# Patient Record
Sex: FEMALE | Race: WHITE | Employment: PART TIME | URBAN - METROPOLITAN AREA
[De-identification: names, ages, dates, MRNs, and addresses within clinical notes are randomized per-mention and may not be internally consistent; named-entity substitution may affect disease eponyms.]

---

## 2017-03-14 ENCOUNTER — OFFICE VISIT (OUTPATIENT)
Dept: FAMILY MEDICINE CLINIC | Facility: CLINIC | Age: 47
End: 2017-03-14

## 2017-03-14 ENCOUNTER — APPOINTMENT (OUTPATIENT)
Dept: LAB | Age: 47
End: 2017-03-14
Attending: INTERNAL MEDICINE
Payer: COMMERCIAL

## 2017-03-14 VITALS
HEIGHT: 67 IN | HEART RATE: 84 BPM | RESPIRATION RATE: 16 BRPM | SYSTOLIC BLOOD PRESSURE: 150 MMHG | DIASTOLIC BLOOD PRESSURE: 100 MMHG | WEIGHT: 216 LBS | BODY MASS INDEX: 33.9 KG/M2

## 2017-03-14 DIAGNOSIS — E11.8 TYPE 2 DIABETES MELLITUS WITH COMPLICATION, WITHOUT LONG-TERM CURRENT USE OF INSULIN (HCC): Primary | ICD-10-CM

## 2017-03-14 DIAGNOSIS — E11.8 TYPE 2 DIABETES MELLITUS WITH COMPLICATION, WITHOUT LONG-TERM CURRENT USE OF INSULIN (HCC): ICD-10-CM

## 2017-03-14 LAB
EST. AVERAGE GLUCOSE BLD GHB EST-MCNC: 131 MG/DL (ref 68–126)
HBA1C MFR BLD HPLC: 6.2 % (ref ?–5.7)

## 2017-03-14 PROCEDURE — 83036 HEMOGLOBIN GLYCOSYLATED A1C: CPT

## 2017-03-14 PROCEDURE — 99213 OFFICE O/P EST LOW 20 MIN: CPT | Performed by: INTERNAL MEDICINE

## 2017-03-14 PROCEDURE — 36415 COLL VENOUS BLD VENIPUNCTURE: CPT

## 2017-03-14 NOTE — PATIENT INSTRUCTIONS
Thank you for choosing Shaq Bruno MD at [place] [address]  To Do: Ngozi Mathew  1. Hemoglobin A1C today (3 month blood sugar average)  2.  Follow up for physical in 3 months    • Please signup for MY CHART, which is electronic access to your patricia please call Central Scheduling at [phone]  Please allow our office 5 business days to contact you regarding any testing results.     Refill policies:   Allow 3 business days for refills; controlled substances may take longer and must be picked up from

## 2017-03-14 NOTE — PROGRESS NOTES
[facility] Internal Medicine Office Note  Chief Complaint:   Patient presents with:  Diabetes  Medication Follow-Up  Blood Pressure      HPI:   This is a 51year old female coming in for diabetes check  She started metformin in November for new D Take 10 mg by mouth daily. Disp:  Rfl:          REVIEW OF SYSTEMS:   Review of Systems   Constitutional: Negative for fever. Respiratory: Negative for shortness of breath. Cardiovascular: Negative for chest pain. Endocrine: Negative for polyuria. vaccines from FDA   -     Hemoglobin A1C [E];  Future          Orders Placed This Encounter  Hemoglobin A1C [E]    Meds & Refills for this Visit:  No prescriptions requested or ordered in this encounter    Imaging & Consults:  None    Annual Depression Scre

## 2017-03-15 ENCOUNTER — TELEPHONE (OUTPATIENT)
Dept: FAMILY MEDICINE CLINIC | Facility: CLINIC | Age: 47
End: 2017-03-15

## 2017-05-11 ENCOUNTER — TELEPHONE (OUTPATIENT)
Dept: FAMILY MEDICINE CLINIC | Facility: CLINIC | Age: 47
End: 2017-05-11

## 2017-05-11 RX ORDER — ALPRAZOLAM 0.25 MG/1
TABLET ORAL
Qty: 6 TABLET | Refills: 0 | Status: SHIPPED
Start: 2017-05-11 | End: 2018-10-25

## 2017-05-11 NOTE — TELEPHONE ENCOUNTER
Per Dr. Ger Brooke she would like patient to be given Alprazolam 0.25mg #6 tablets prn anxiety. She asked me to have NP sign.

## 2017-05-11 NOTE — TELEPHONE ENCOUNTER
Requesting Anti-Anxiety Med for flight  LOV: 3/14/17  RTC: 3 months  Last Labs: n/a  Filled: never prescribed in history    Future Appointments  Date Time Provider Jennifer Serrato   6/16/2017 9:30 AM Harjit Mahajan MD [facility] [address] [facility] [address]

## 2017-05-11 NOTE — TELEPHONE ENCOUNTER
[facility] [address] [place] [address] [id number] Health system AT [facility]  [address], [phone], [phone]

## 2017-06-30 ENCOUNTER — PATIENT OUTREACH (OUTPATIENT)
Dept: FAMILY MEDICINE CLINIC | Facility: CLINIC | Age: 47
End: 2017-06-30

## 2017-06-30 NOTE — PROGRESS NOTES
Called and left message for patient on her cell phone to call office back and schedule a diabetic check up .

## 2017-07-10 ENCOUNTER — HOSPITAL ENCOUNTER (OUTPATIENT)
Age: 47
Discharge: HOME | End: 2017-07-10
Attending: EMERGENCY MEDICINE
Payer: COMMERCIAL

## 2017-07-10 VITALS
WEIGHT: 200 LBS | SYSTOLIC BLOOD PRESSURE: 134 MMHG | OXYGEN SATURATION: 99 % | HEART RATE: 112 BPM | DIASTOLIC BLOOD PRESSURE: 101 MMHG | RESPIRATION RATE: 16 BRPM | BODY MASS INDEX: 31 KG/M2 | TEMPERATURE: 97 F

## 2017-07-10 DIAGNOSIS — R07.81 RIB PAIN ON RIGHT SIDE: Primary | ICD-10-CM

## 2017-07-10 DIAGNOSIS — J01.00 ACUTE NON-RECURRENT MAXILLARY SINUSITIS: ICD-10-CM

## 2017-07-10 LAB
POCT BILIRUBIN URINE: NEGATIVE
POCT BLOOD URINE: NEGATIVE
POCT GLUCOSE URINE: NEGATIVE MG/DL
POCT KETONE URINE: NEGATIVE MG/DL
POCT NITRITE URINE: NEGATIVE
POCT PH URINE: 6 (ref 5–8)
POCT PROTEIN URINE: NEGATIVE MG/DL
POCT SPECIFIC GRAVITY URINE: 1.02
POCT URINE COLOR: YELLOW
POCT URINE PREGNANCY: NEGATIVE
POCT UROBILINOGEN URINE: 0.2 MG/DL

## 2017-07-10 PROCEDURE — 81002 URINALYSIS NONAUTO W/O SCOPE: CPT | Performed by: EMERGENCY MEDICINE

## 2017-07-10 PROCEDURE — 87086 URINE CULTURE/COLONY COUNT: CPT | Performed by: EMERGENCY MEDICINE

## 2017-07-10 PROCEDURE — 99214 OFFICE O/P EST MOD 30 MIN: CPT

## 2017-07-10 PROCEDURE — 81025 URINE PREGNANCY TEST: CPT | Performed by: EMERGENCY MEDICINE

## 2017-07-10 RX ORDER — AZITHROMYCIN 250 MG/1
TABLET, FILM COATED ORAL
Qty: 1 PACKAGE | Refills: 0 | Status: SHIPPED | OUTPATIENT
Start: 2017-07-10 | End: 2017-07-15

## 2017-07-10 RX ORDER — FLUTICASONE PROPIONATE 50 MCG
2 SPRAY, SUSPENSION (ML) NASAL DAILY
Qty: 16 G | Refills: 0 | Status: SHIPPED | OUTPATIENT
Start: 2017-07-10 | End: 2017-08-09

## 2017-07-10 RX ORDER — CYCLOBENZAPRINE HCL 5 MG
TABLET ORAL 3 TIMES DAILY PRN
Qty: 20 TABLET | Refills: 0 | Status: SHIPPED | OUTPATIENT
Start: 2017-07-10 | End: 2017-11-27

## 2017-07-10 NOTE — ED PROVIDER NOTES
Patient presents with:  Sore Throat  Back Pain (musculoskeletal)    HPI:     Lucio Gómez is a 51year old female who presents with chief complaint of sore throat, facial pressure, congestion and back pain.   Hx of seasonal allergies that have been incr point tenderness c/w pain pt is experiencing. Lungs: no respiratory distress  Heart: regular rate and rhythm  Abdomen: soft,non-distended, minimal TTP to the suprapubic region. No TTP in the RLQ, RUQ, epigastrium, LUQ, LLQ. No rebound or guarding.    Ex

## 2017-07-10 NOTE — ED INITIAL ASSESSMENT (HPI)
Friday sore throat with post nasal drip and occasional nasal congestion. Denies fever. A little bit of a cough per patient especially with lying down. Saturday evening developed right mid to upper back pain without known injury.   Pain doesn't increa

## 2017-07-14 NOTE — ED NOTES
Pt requesting one pill from z-pack. Pt states dropped one yesterday and wanted to replace it.   Dr Rakesh Whitlock approved it

## 2017-11-27 ENCOUNTER — OFFICE VISIT (OUTPATIENT)
Dept: FAMILY MEDICINE CLINIC | Facility: CLINIC | Age: 47
End: 2017-11-27

## 2017-11-27 VITALS
HEIGHT: 67 IN | DIASTOLIC BLOOD PRESSURE: 84 MMHG | WEIGHT: 227 LBS | RESPIRATION RATE: 16 BRPM | SYSTOLIC BLOOD PRESSURE: 138 MMHG | HEART RATE: 90 BPM | BODY MASS INDEX: 35.63 KG/M2

## 2017-11-27 DIAGNOSIS — E11.8 TYPE 2 DIABETES MELLITUS WITH COMPLICATION, WITHOUT LONG-TERM CURRENT USE OF INSULIN (HCC): Primary | ICD-10-CM

## 2017-11-27 PROCEDURE — 99213 OFFICE O/P EST LOW 20 MIN: CPT | Performed by: PHYSICIAN ASSISTANT

## 2017-11-27 NOTE — PATIENT INSTRUCTIONS
Thank you for choosing Brendan Kennedy PA-C at [place] [address]  To Do: Ngozi Mathew  1. Pt to continue medications as prescribed  2. Get lab work done  3.  Follow-up in 6 months, sooner if problems  Effective 6/19/17 until November 2017  Due to C All therapies have potential risk of harm or side effects or medication interactions.  It is your duty and for your safety to discuss with the pharmacist and our office with questions, and to notify us and stop treatment if problems arise, but know that ou

## 2017-11-27 NOTE — PROGRESS NOTES
[facility] Internal Medicine Progress Note    CC:  Patient presents with:  Medication Follow-Up      HPI:   HPI  DM  She has been traveling a lot and a lot of stressful things going on  She has not been eating well  She has been taking metformin Ht 67\"   Wt 227 lb   LMP 11/13/2017   BMI 35.55 kg/m²  Body mass index is 35.55 kg/m². Physical Exam   Constitutional: She is oriented to person, place, and time and well-developed, well-nourished, and in no distress.    HENT:   Right Ear: External ear n Positive ZUHAIR (antinuclear antibody)     Type 2 diabetes mellitus with complication, without long-term current use of insulin (HCC)

## 2018-10-10 PROBLEM — N92.0 MENORRHAGIA: Status: ACTIVE | Noted: 2018-10-10

## 2018-10-10 PROBLEM — D21.9 FIBROIDS: Status: ACTIVE | Noted: 2018-10-10

## 2018-10-10 PROCEDURE — 88175 CYTOPATH C/V AUTO FLUID REDO: CPT | Performed by: NURSE PRACTITIONER

## 2018-10-10 PROCEDURE — 87624 HPV HI-RISK TYP POOLED RSLT: CPT | Performed by: NURSE PRACTITIONER

## 2018-10-17 PROCEDURE — 88305 TISSUE EXAM BY PATHOLOGIST: CPT | Performed by: NURSE PRACTITIONER

## 2018-11-21 ENCOUNTER — APPOINTMENT (OUTPATIENT)
Dept: LAB | Age: 48
End: 2018-11-21
Attending: OBSTETRICS & GYNECOLOGY
Payer: COMMERCIAL

## 2018-11-21 DIAGNOSIS — D25.9 FIBROID UTERUS: ICD-10-CM

## 2018-11-21 PROCEDURE — 85027 COMPLETE CBC AUTOMATED: CPT

## 2018-11-28 NOTE — H&P
[facility]  History & Physical    [facility] Patient Status:  Outpatient in a Bed    1970 MRN [id number]   [facility] SURGERY Attending Janny Heaton MD   Hosp Day # 0 PCP Michi Kc MD     SUBJECTIVE:    Reason f Fibromyalgia    • Multiple thyroid nodules    • Other abnormal glucose 09/13/11   • Other and unspecified hyperlipidemia 09/13/11   • Other malaise and fatigue 08/26/11   • Peripheral vertigo, unspecified 09/20/11   • Prediabetes    • Seasonal allergies Mucous membranes are moist. Pupils are equal and round, reactive to light and accommodate. Oropharynx is clear. Neck: No tenderness to palpitation. Full range of motion to flexion and extension, lateral rotation and lateral flexion of cervical spine.   Fernanda Reich laparoscopic hysterectomy bilateral salpingectomies    All of the findings and plan were discussed with the patient. She notes understanding and agrees with the plan of care.  She understands the risks and complications of the procedure; including but not

## 2018-11-29 ENCOUNTER — HOSPITAL ENCOUNTER (OUTPATIENT)
Facility: HOSPITAL | Age: 48
Setting detail: OBSERVATION
Discharge: HOME | End: 2018-11-30
Attending: OBSTETRICS & GYNECOLOGY | Admitting: OBSTETRICS & GYNECOLOGY
Payer: COMMERCIAL

## 2018-11-29 ENCOUNTER — ANESTHESIA EVENT (OUTPATIENT)
Dept: SURGERY | Facility: HOSPITAL | Age: 48
End: 2018-11-29

## 2018-11-29 ENCOUNTER — ANESTHESIA (OUTPATIENT)
Dept: SURGERY | Facility: HOSPITAL | Age: 48
End: 2018-11-29

## 2018-11-29 DIAGNOSIS — D25.9 FIBROID UTERUS: Primary | ICD-10-CM

## 2018-11-29 DIAGNOSIS — D25.1 INTRAMURAL LEIOMYOMA OF UTERUS: ICD-10-CM

## 2018-11-29 PROCEDURE — 80048 BASIC METABOLIC PNL TOTAL CA: CPT

## 2018-11-29 PROCEDURE — 8E0W4CZ ROBOTIC ASSISTED PROCEDURE OF TRUNK REGION, PERCUTANEOUS ENDOSCOPIC APPROACH: ICD-10-PCS | Performed by: OBSTETRICS & GYNECOLOGY

## 2018-11-29 PROCEDURE — S0028 INJECTION, FAMOTIDINE, 20 MG: HCPCS

## 2018-11-29 PROCEDURE — 82962 GLUCOSE BLOOD TEST: CPT

## 2018-11-29 PROCEDURE — 85027 COMPLETE CBC AUTOMATED: CPT | Performed by: OBSTETRICS & GYNECOLOGY

## 2018-11-29 PROCEDURE — 0UT74ZZ RESECTION OF BILATERAL FALLOPIAN TUBES, PERCUTANEOUS ENDOSCOPIC APPROACH: ICD-10-PCS | Performed by: OBSTETRICS & GYNECOLOGY

## 2018-11-29 PROCEDURE — 93010 ELECTROCARDIOGRAM REPORT: CPT | Performed by: INTERNAL MEDICINE

## 2018-11-29 PROCEDURE — 0UT94ZZ RESECTION OF UTERUS, PERCUTANEOUS ENDOSCOPIC APPROACH: ICD-10-PCS | Performed by: OBSTETRICS & GYNECOLOGY

## 2018-11-29 PROCEDURE — 88307 TISSUE EXAM BY PATHOLOGIST: CPT | Performed by: OBSTETRICS & GYNECOLOGY

## 2018-11-29 PROCEDURE — 93005 ELECTROCARDIOGRAM TRACING: CPT

## 2018-11-29 PROCEDURE — 81025 URINE PREGNANCY TEST: CPT | Performed by: OBSTETRICS & GYNECOLOGY

## 2018-11-29 DEVICE — INTERCEED: Type: IMPLANTABLE DEVICE | Status: FUNCTIONAL

## 2018-11-29 RX ORDER — NALOXONE HYDROCHLORIDE 0.4 MG/ML
80 INJECTION, SOLUTION INTRAMUSCULAR; INTRAVENOUS; SUBCUTANEOUS AS NEEDED
Status: DISCONTINUED | OUTPATIENT
Start: 2018-11-29 | End: 2018-11-29 | Stop reason: HOSPADM

## 2018-11-29 RX ORDER — CLINDAMYCIN PHOSPHATE 900 MG/50ML
INJECTION INTRAVENOUS
Status: COMPLETED | OUTPATIENT
Start: 2018-11-29 | End: 2018-11-29

## 2018-11-29 RX ORDER — ONDANSETRON 2 MG/ML
4 INJECTION INTRAMUSCULAR; INTRAVENOUS AS NEEDED
Status: DISCONTINUED | OUTPATIENT
Start: 2018-11-29 | End: 2018-11-29 | Stop reason: HOSPADM

## 2018-11-29 RX ORDER — SODIUM CHLORIDE, SODIUM LACTATE, POTASSIUM CHLORIDE, CALCIUM CHLORIDE 600; 310; 30; 20 MG/100ML; MG/100ML; MG/100ML; MG/100ML
INJECTION, SOLUTION INTRAVENOUS CONTINUOUS
Status: DISCONTINUED | OUTPATIENT
Start: 2018-11-29 | End: 2018-11-29 | Stop reason: HOSPADM

## 2018-11-29 RX ORDER — SCOLOPAMINE TRANSDERMAL SYSTEM 1 MG/1
PATCH, EXTENDED RELEASE TRANSDERMAL
Status: DISPENSED
Start: 2018-11-29 | End: 2018-11-29

## 2018-11-29 RX ORDER — HYDROMORPHONE HYDROCHLORIDE 1 MG/ML
0.5 INJECTION, SOLUTION INTRAMUSCULAR; INTRAVENOUS; SUBCUTANEOUS EVERY 2 HOUR PRN
Status: DISCONTINUED | OUTPATIENT
Start: 2018-11-29 | End: 2018-11-30

## 2018-11-29 RX ORDER — HYDROMORPHONE HYDROCHLORIDE 1 MG/ML
0.4 INJECTION, SOLUTION INTRAMUSCULAR; INTRAVENOUS; SUBCUTANEOUS EVERY 5 MIN PRN
Status: DISCONTINUED | OUTPATIENT
Start: 2018-11-29 | End: 2018-11-29 | Stop reason: HOSPADM

## 2018-11-29 RX ORDER — HYDROCODONE BITARTRATE AND ACETAMINOPHEN 5; 325 MG/1; MG/1
1 TABLET ORAL EVERY 4 HOURS PRN
Status: DISCONTINUED | OUTPATIENT
Start: 2018-11-29 | End: 2018-11-30

## 2018-11-29 RX ORDER — HYDROMORPHONE HYDROCHLORIDE 1 MG/ML
1 INJECTION, SOLUTION INTRAMUSCULAR; INTRAVENOUS; SUBCUTANEOUS EVERY 2 HOUR PRN
Status: DISCONTINUED | OUTPATIENT
Start: 2018-11-29 | End: 2018-11-30

## 2018-11-29 RX ORDER — KETOROLAC TROMETHAMINE 15 MG/ML
15 INJECTION, SOLUTION INTRAMUSCULAR; INTRAVENOUS EVERY 6 HOURS PRN
Status: DISCONTINUED | OUTPATIENT
Start: 2018-11-29 | End: 2018-11-30

## 2018-11-29 RX ORDER — ONDANSETRON 4 MG/1
4 TABLET, FILM COATED ORAL EVERY 8 HOURS PRN
Status: DISCONTINUED | OUTPATIENT
Start: 2018-11-29 | End: 2018-11-30

## 2018-11-29 RX ORDER — ACETAMINOPHEN 500 MG
1000 TABLET ORAL ONCE
Status: DISCONTINUED | OUTPATIENT
Start: 2018-11-29 | End: 2018-11-29

## 2018-11-29 RX ORDER — ONDANSETRON 2 MG/ML
4 INJECTION INTRAMUSCULAR; INTRAVENOUS EVERY 8 HOURS PRN
Status: DISCONTINUED | OUTPATIENT
Start: 2018-11-29 | End: 2018-11-30

## 2018-11-29 RX ORDER — DEXTROSE MONOHYDRATE 25 G/50ML
50 INJECTION, SOLUTION INTRAVENOUS
Status: DISCONTINUED | OUTPATIENT
Start: 2018-11-29 | End: 2018-11-29 | Stop reason: HOSPADM

## 2018-11-29 RX ORDER — KETOROLAC TROMETHAMINE 30 MG/ML
30 INJECTION, SOLUTION INTRAMUSCULAR; INTRAVENOUS EVERY 6 HOURS PRN
Status: DISCONTINUED | OUTPATIENT
Start: 2018-11-29 | End: 2018-11-30

## 2018-11-29 RX ORDER — ACETAMINOPHEN 10 MG/ML
1000 INJECTION, SOLUTION INTRAVENOUS EVERY 6 HOURS PRN
Status: DISCONTINUED | OUTPATIENT
Start: 2018-11-29 | End: 2018-11-30

## 2018-11-29 RX ORDER — HYDROCODONE BITARTRATE AND ACETAMINOPHEN 5; 325 MG/1; MG/1
1 TABLET ORAL AS NEEDED
Status: DISCONTINUED | OUTPATIENT
Start: 2018-11-29 | End: 2018-11-29 | Stop reason: HOSPADM

## 2018-11-29 RX ORDER — DEXAMETHASONE SODIUM PHOSPHATE 4 MG/ML
8 VIAL (ML) INJECTION AS NEEDED
Status: DISCONTINUED | OUTPATIENT
Start: 2018-11-29 | End: 2018-11-29 | Stop reason: HOSPADM

## 2018-11-29 RX ORDER — BUPIVACAINE HYDROCHLORIDE 5 MG/ML
INJECTION, SOLUTION EPIDURAL; INTRACAUDAL AS NEEDED
Status: DISCONTINUED | OUTPATIENT
Start: 2018-11-29 | End: 2018-11-29 | Stop reason: HOSPADM

## 2018-11-29 RX ORDER — CLINDAMYCIN PHOSPHATE 900 MG/50ML
900 INJECTION INTRAVENOUS ONCE
Status: DISCONTINUED | OUTPATIENT
Start: 2018-11-29 | End: 2018-11-29 | Stop reason: HOSPADM

## 2018-11-29 RX ORDER — LEVOCETIRIZINE DIHYDROCHLORIDE 5 MG/1
5 TABLET, FILM COATED ORAL EVERY EVENING
COMMUNITY
End: 2020-02-17

## 2018-11-29 RX ORDER — SCOLOPAMINE TRANSDERMAL SYSTEM 1 MG/1
1 PATCH, EXTENDED RELEASE TRANSDERMAL
Status: DISCONTINUED | OUTPATIENT
Start: 2018-11-29 | End: 2018-12-02 | Stop reason: HOSPADM

## 2018-11-29 RX ORDER — HYDROMORPHONE HYDROCHLORIDE 1 MG/ML
INJECTION, SOLUTION INTRAMUSCULAR; INTRAVENOUS; SUBCUTANEOUS
Status: COMPLETED
Start: 2018-11-29 | End: 2018-11-29

## 2018-11-29 RX ORDER — METOCLOPRAMIDE HYDROCHLORIDE 5 MG/ML
10 INJECTION INTRAMUSCULAR; INTRAVENOUS AS NEEDED
Status: DISCONTINUED | OUTPATIENT
Start: 2018-11-29 | End: 2018-11-29 | Stop reason: HOSPADM

## 2018-11-29 RX ORDER — MEPERIDINE HYDROCHLORIDE 25 MG/ML
12.5 INJECTION INTRAMUSCULAR; INTRAVENOUS; SUBCUTANEOUS AS NEEDED
Status: DISCONTINUED | OUTPATIENT
Start: 2018-11-29 | End: 2018-11-29 | Stop reason: HOSPADM

## 2018-11-29 RX ORDER — HYDROCODONE BITARTRATE AND ACETAMINOPHEN 5; 325 MG/1; MG/1
2 TABLET ORAL AS NEEDED
Status: DISCONTINUED | OUTPATIENT
Start: 2018-11-29 | End: 2018-11-29 | Stop reason: HOSPADM

## 2018-11-29 RX ORDER — LIOTHYRONINE SODIUM 5 UG/1
5 TABLET ORAL DAILY
COMMUNITY
End: 2019-01-07

## 2018-11-29 RX ORDER — IBUPROFEN 600 MG/1
600 TABLET ORAL EVERY 6 HOURS PRN
Status: DISCONTINUED | OUTPATIENT
Start: 2018-11-29 | End: 2018-11-30

## 2018-11-29 RX ORDER — DEXTROSE, SODIUM CHLORIDE, AND POTASSIUM CHLORIDE 5; .45; .15 G/100ML; G/100ML; G/100ML
INJECTION INTRAVENOUS CONTINUOUS
Status: DISCONTINUED | OUTPATIENT
Start: 2018-11-29 | End: 2018-11-30

## 2018-11-29 RX ORDER — MIDAZOLAM HYDROCHLORIDE 1 MG/ML
1 INJECTION INTRAMUSCULAR; INTRAVENOUS EVERY 5 MIN PRN
Status: DISCONTINUED | OUTPATIENT
Start: 2018-11-29 | End: 2018-11-29 | Stop reason: HOSPADM

## 2018-11-29 RX ORDER — HYDROCODONE BITARTRATE AND ACETAMINOPHEN 5; 325 MG/1; MG/1
2 TABLET ORAL EVERY 4 HOURS PRN
Status: DISCONTINUED | OUTPATIENT
Start: 2018-11-29 | End: 2018-11-30

## 2018-11-29 RX ORDER — ACETAMINOPHEN 500 MG
1000 TABLET ORAL ONCE AS NEEDED
Status: COMPLETED | OUTPATIENT
Start: 2018-11-29 | End: 2018-11-29

## 2018-11-29 RX ORDER — LIOTHYRONINE SODIUM 5 UG/1
5 TABLET ORAL
Status: DISCONTINUED | OUTPATIENT
Start: 2018-11-30 | End: 2018-11-30

## 2018-11-29 NOTE — INTERVAL H&P NOTE
Reviewed procedure, risks and complications again. Emphasized risks to to GI and  tracts. All questioned answered.   Plan:  RTLS

## 2018-11-29 NOTE — PROGRESS NOTES
Patient complained of left hand numbness with tingling, thumb & second finger. Noticed these symptoms preop occasionally, but now more noticeable.     /79 (BP Location: Left arm)   Pulse 105   Temp 97.4 °F (36.3 °C) (Oral)   Resp 18   Ht 1.702 m (5'

## 2018-11-29 NOTE — PROGRESS NOTES
Pt arrived to PACU from OR. Placed on Monitors, Vitals taken. Re-oriented to place and time on arrival. Verbal report recieved from OR Staff and Anesthesia.

## 2018-11-29 NOTE — PROGRESS NOTES
PACU     Recent Labs   Lab  11/29/18   1105   RBC  4.10   HGB  11.9*   HCT  37.9   MCV  92.4   MCH  29.0   MCHC  31.4   RDW  14.6   WBC  17.9*   PLT  301.0     Hb excellent post. Op in RR.

## 2018-11-29 NOTE — ANESTHESIA PREPROCEDURE EVALUATION
PRE-OP EVALUATION    Patient Name: Arianna Fletcher    Pre-op Diagnosis: fibroid uterus, pelvic pain    Procedure(s):  robotic total laparoscopic hysterectomy bilateral salpingectomies    Surgeon(s) and Role:     * Ute Castellano MD - Primary    Pre Allergies: Coffee Bean Extract [Coffea Arabica]; Corn; Erythromycin; Other; Pcn [Penicillins]; Pork Derived Products      Anesthesia Evaluation    Patient summary reviewed.     Anesthetic Complications  (-) history of anesthetic complications dental history. Pulmonary    Pulmonary exam normal.  Breath sounds clear to auscultation bilaterally. Other findings  Affect:  Terrified. Elevated diastolic BP noted on presentation.           ASA: 2   Plan: general  NPO status verifi

## 2018-11-29 NOTE — OPERATIVE REPORT
PREOPERATIVE DIAGNOSIS:  Fibroid Uterus           Pelvic Pain           Menorrhagia               POSTOPERATIVE DIAGNOSIS:  Same    PROCEDURE PERFORMED:   Robotic Total Laparoscopic Hysterectomy bilateral salpingectomies       Lysis of adhesions Sutures were placed through the 12 and 6 o'clock positions of 0 Vicryl, and the appropriate sizde V-Care uterine manipulator was placed within the uterine cavity and the balloon inflated, The cervical cap was sutured to the cervix.   The weighted speculum a colon obscuring the infundibulopelvic ligament. Initially, round ligaments were cauterized and cut, and the anterior aspect of the broad ligament was skeletonized, freeing the bladder from the anterior aspect of the uterus.   The posterior aspects of the b were aspirated. Interceed was placed over the vaginal cuff. A low pressure test was done by reducing the intra-abdominal pressure  to 5 mm. The robotic instruments were removed, the robot was undocked.  A 5-mm scope was used to remove the suture through

## 2018-11-29 NOTE — ANESTHESIA POSTPROCEDURE EVALUATION
[facility]    Arianna Fletcher Patient Status:  Outpatient in a Bed   Age/Gender 51year old female MRN [id number]   [facility] SURGERY Attending Ute Castellano MD   Hosp Day # 0 PCP Bryce Lopez MD       Anesthesia Post-op Note

## 2018-11-30 VITALS
HEART RATE: 69 BPM | WEIGHT: 227.06 LBS | HEIGHT: 67 IN | SYSTOLIC BLOOD PRESSURE: 134 MMHG | DIASTOLIC BLOOD PRESSURE: 82 MMHG | OXYGEN SATURATION: 100 % | BODY MASS INDEX: 35.64 KG/M2 | RESPIRATION RATE: 18 BRPM | TEMPERATURE: 98 F

## 2018-11-30 PROBLEM — Z90.710 S/P LAPAROSCOPIC HYSTERECTOMY: Status: ACTIVE | Noted: 2018-11-30

## 2018-11-30 PROCEDURE — 85025 COMPLETE CBC W/AUTO DIFF WBC: CPT | Performed by: OBSTETRICS & GYNECOLOGY

## 2018-11-30 RX ORDER — HYDROCODONE BITARTRATE AND ACETAMINOPHEN 5; 325 MG/1; MG/1
1-2 TABLET ORAL EVERY 4 HOURS PRN
Qty: 20 TABLET | Refills: 0 | Status: SHIPPED | OUTPATIENT
Start: 2018-11-30 | End: 2019-03-15

## 2018-11-30 NOTE — PROGRESS NOTES
[facility]  Progress Note    Severo Argyle Patient Status:  Observation    1970 MRN [id number]   [facility] 3NW-A Attending Gregory Johnson MD   Hosp Day # 0 PCP Misha Evans MD     Subjective:  POD: 1 - feels good.   No

## 2018-11-30 NOTE — DISCHARGE SUMMARY
[facility]  Discharge Summary    Ashwini Quivers Patient Status:  Observation    1970 MRN [id number]   [facility] 3NW-A Attending Rufus Collier MD   Hosp Day # 0 PCP Alvarez Miller MD     Date of Admission: 2018    D Pelvic Rest and  routine post. Operative precautions    Jasper prn    Follow up Visits:  Follow-up in 2 weeks      Other Discharge Instructions: Pelvic Rest, no heavy lifting    Lovena Staggers  11/30/2018  9:25 AM

## 2018-11-30 NOTE — PLAN OF CARE
VSS,afebrile. Tolerating general diet, -flatus or bm. Lap sites x5 clean & intact w/ skin glue. Reports mild abd cramping, pain meds admin w/ relief. Up ad magdalene, voiding freely. No vaginal drainage noted. Will monitor.      PAIN - ADULT    • Verbalizes/d

## 2018-12-06 ENCOUNTER — HOSPITAL ENCOUNTER (EMERGENCY)
Facility: HOSPITAL | Age: 48
Discharge: HOME | DRG: 863 | End: 2018-12-06
Payer: COMMERCIAL

## 2018-12-06 ENCOUNTER — APPOINTMENT (OUTPATIENT)
Dept: ULTRASOUND IMAGING | Facility: HOSPITAL | Age: 48
DRG: 863 | End: 2018-12-06
Attending: OBSTETRICS & GYNECOLOGY
Payer: COMMERCIAL

## 2018-12-06 ENCOUNTER — LAB ENCOUNTER (OUTPATIENT)
Dept: LAB | Facility: HOSPITAL | Age: 48
End: 2018-12-06
Attending: NURSE PRACTITIONER
Payer: COMMERCIAL

## 2018-12-06 ENCOUNTER — HOSPITAL ENCOUNTER (INPATIENT)
Facility: HOSPITAL | Age: 48
LOS: 3 days | Discharge: HOME | DRG: 863 | End: 2018-12-09
Attending: OBSTETRICS & GYNECOLOGY | Admitting: OBSTETRICS & GYNECOLOGY
Payer: COMMERCIAL

## 2018-12-06 DIAGNOSIS — R10.9 ABDOMINAL PAIN: ICD-10-CM

## 2018-12-06 DIAGNOSIS — R50.82 POST-PROCEDURAL FEVER: ICD-10-CM

## 2018-12-06 PROCEDURE — 85025 COMPLETE CBC W/AUTO DIFF WBC: CPT

## 2018-12-06 PROCEDURE — 36415 COLL VENOUS BLD VENIPUNCTURE: CPT

## 2018-12-06 RX ORDER — IBUPROFEN 800 MG/1
800 TABLET ORAL 3 TIMES DAILY PRN
Status: DISCONTINUED | OUTPATIENT
Start: 2018-12-06 | End: 2018-12-09

## 2018-12-06 RX ORDER — CLINDAMYCIN PHOSPHATE 900 MG/50ML
900 INJECTION INTRAVENOUS EVERY 8 HOURS
Status: DISCONTINUED | OUTPATIENT
Start: 2018-12-06 | End: 2018-12-09

## 2018-12-06 RX ORDER — DEXTROSE, SODIUM CHLORIDE, SODIUM LACTATE, POTASSIUM CHLORIDE, AND CALCIUM CHLORIDE 5; .6; .31; .03; .02 G/100ML; G/100ML; G/100ML; G/100ML; G/100ML
INJECTION, SOLUTION INTRAVENOUS CONTINUOUS
Status: DISCONTINUED | OUTPATIENT
Start: 2018-12-06 | End: 2018-12-09

## 2018-12-06 RX ORDER — ACETAMINOPHEN 500 MG
1000 TABLET ORAL EVERY 4 HOURS PRN
Status: DISPENSED | OUTPATIENT
Start: 2018-12-06 | End: 2018-12-07

## 2018-12-07 ENCOUNTER — ANESTHESIA EVENT (OUTPATIENT)
Dept: SURGERY | Facility: HOSPITAL | Age: 48
DRG: 863 | End: 2018-12-07
Payer: COMMERCIAL

## 2018-12-07 ENCOUNTER — APPOINTMENT (OUTPATIENT)
Dept: ULTRASOUND IMAGING | Facility: HOSPITAL | Age: 48
DRG: 863 | End: 2018-12-07
Attending: OBSTETRICS & GYNECOLOGY
Payer: COMMERCIAL

## 2018-12-07 ENCOUNTER — ANESTHESIA (OUTPATIENT)
Dept: SURGERY | Facility: HOSPITAL | Age: 48
DRG: 863 | End: 2018-12-07
Payer: COMMERCIAL

## 2018-12-07 PROCEDURE — 82962 GLUCOSE BLOOD TEST: CPT

## 2018-12-07 PROCEDURE — 87077 CULTURE AEROBIC IDENTIFY: CPT | Performed by: OBSTETRICS & GYNECOLOGY

## 2018-12-07 PROCEDURE — 76857 US EXAM PELVIC LIMITED: CPT | Performed by: OBSTETRICS & GYNECOLOGY

## 2018-12-07 PROCEDURE — 87070 CULTURE OTHR SPECIMN AEROBIC: CPT | Performed by: OBSTETRICS & GYNECOLOGY

## 2018-12-07 PROCEDURE — 87186 SC STD MICRODIL/AGAR DIL: CPT | Performed by: OBSTETRICS & GYNECOLOGY

## 2018-12-07 PROCEDURE — 0J9C0ZX DRAINAGE OF PELVIC REGION SUBCUTANEOUS TISSUE AND FASCIA, OPEN APPROACH, DIAGNOSTIC: ICD-10-PCS | Performed by: OBSTETRICS & GYNECOLOGY

## 2018-12-07 PROCEDURE — 85025 COMPLETE CBC W/AUTO DIFF WBC: CPT | Performed by: OBSTETRICS & GYNECOLOGY

## 2018-12-07 PROCEDURE — 81003 URINALYSIS AUTO W/O SCOPE: CPT | Performed by: OBSTETRICS & GYNECOLOGY

## 2018-12-07 PROCEDURE — 87205 SMEAR GRAM STAIN: CPT | Performed by: OBSTETRICS & GYNECOLOGY

## 2018-12-07 PROCEDURE — 87075 CULTR BACTERIA EXCEPT BLOOD: CPT | Performed by: OBSTETRICS & GYNECOLOGY

## 2018-12-07 RX ORDER — HYDROMORPHONE HYDROCHLORIDE 1 MG/ML
0.4 INJECTION, SOLUTION INTRAMUSCULAR; INTRAVENOUS; SUBCUTANEOUS EVERY 5 MIN PRN
Status: DISCONTINUED | OUTPATIENT
Start: 2018-12-07 | End: 2018-12-07 | Stop reason: HOSPADM

## 2018-12-07 RX ORDER — ONDANSETRON 2 MG/ML
4 INJECTION INTRAMUSCULAR; INTRAVENOUS AS NEEDED
Status: DISCONTINUED | OUTPATIENT
Start: 2018-12-07 | End: 2018-12-07 | Stop reason: HOSPADM

## 2018-12-07 RX ORDER — LIOTHYRONINE SODIUM 5 UG/1
5 TABLET ORAL DAILY
Status: DISCONTINUED | OUTPATIENT
Start: 2018-12-07 | End: 2018-12-09

## 2018-12-07 RX ORDER — METOCLOPRAMIDE HYDROCHLORIDE 5 MG/ML
10 INJECTION INTRAMUSCULAR; INTRAVENOUS AS NEEDED
Status: DISCONTINUED | OUTPATIENT
Start: 2018-12-07 | End: 2018-12-07 | Stop reason: HOSPADM

## 2018-12-07 RX ORDER — HYDROCODONE BITARTRATE AND ACETAMINOPHEN 5; 325 MG/1; MG/1
1-2 TABLET ORAL EVERY 4 HOURS PRN
Status: DISCONTINUED | OUTPATIENT
Start: 2018-12-07 | End: 2018-12-09

## 2018-12-07 RX ORDER — CETIRIZINE HYDROCHLORIDE 10 MG/1
10 TABLET ORAL DAILY
Status: DISCONTINUED | OUTPATIENT
Start: 2018-12-07 | End: 2018-12-09

## 2018-12-07 RX ORDER — DEXTROSE MONOHYDRATE 25 G/50ML
50 INJECTION, SOLUTION INTRAVENOUS
Status: DISCONTINUED | OUTPATIENT
Start: 2018-12-07 | End: 2018-12-07 | Stop reason: HOSPADM

## 2018-12-07 RX ORDER — SODIUM CHLORIDE, SODIUM LACTATE, POTASSIUM CHLORIDE, CALCIUM CHLORIDE 600; 310; 30; 20 MG/100ML; MG/100ML; MG/100ML; MG/100ML
INJECTION, SOLUTION INTRAVENOUS CONTINUOUS
Status: DISCONTINUED | OUTPATIENT
Start: 2018-12-07 | End: 2018-12-07 | Stop reason: HOSPADM

## 2018-12-07 RX ORDER — NALOXONE HYDROCHLORIDE 0.4 MG/ML
80 INJECTION, SOLUTION INTRAMUSCULAR; INTRAVENOUS; SUBCUTANEOUS AS NEEDED
Status: DISCONTINUED | OUTPATIENT
Start: 2018-12-07 | End: 2018-12-07 | Stop reason: HOSPADM

## 2018-12-07 RX ORDER — MIDAZOLAM HYDROCHLORIDE 1 MG/ML
1 INJECTION INTRAMUSCULAR; INTRAVENOUS EVERY 5 MIN PRN
Status: DISCONTINUED | OUTPATIENT
Start: 2018-12-07 | End: 2018-12-07 | Stop reason: HOSPADM

## 2018-12-07 RX ORDER — MEPERIDINE HYDROCHLORIDE 25 MG/ML
12.5 INJECTION INTRAMUSCULAR; INTRAVENOUS; SUBCUTANEOUS AS NEEDED
Status: DISCONTINUED | OUTPATIENT
Start: 2018-12-07 | End: 2018-12-07 | Stop reason: HOSPADM

## 2018-12-07 NOTE — OPERATIVE REPORT
OPERATIVE REPORT   PREOPERATIVE DIAGNOSES:   1.  Pelvic Abscess  2.   S/P Robotic Hysterectomy  POSTOPERATIVE DIAGNOSES:   Same  PROCEDURES PERFORMED:   Transvaginal drainage of pelvic / vaginal cuff abscess  SURGEON:  Wilda Mosley  ANESTHESIA: General.   ESTIMATE

## 2018-12-07 NOTE — H&P
[facility]  History & Physical - Interim     [facility] Patient Status:  Inpatient    1970 MRN [id number]   [facility] 3NW-A Attending Harry Orta MD   Hosp Day # 1 PCP Taisha Archer MD     SUBJECTIVE:    Reason fo 4 (four) hours as needed. Disp: 20 tablet Rfl: 0 Not Taking   Cholecalciferol (VITAMIN D) 1000 UNITS Oral Tab Take 1,000 Units by mouth daily. Disp:  Rfl:  Unknown at Unknown time   Acidophilus/Pectin Oral Cap Take 1 capsule by mouth daily.  Disp:  Rfl: Years since quittin.9      Smokeless tobacco: Never Used    Alcohol use: No      Frequency: 2-4 times a month      Drinks per session: 1 or 2      Binge frequency: Never      Comment: occasionally       Family History:  Family History   Problem Relati extremity edema noted. Without clubbing or cyanosis.     Skin: Normal texture     Recent Labs   Lab  11/30/18   0641  12/06/18   1738   RBC  3.53*  3.81   HGB  10.1*  10.7*   HCT  32.4*  34.3   MCV  91.8  90.0   MCH  28.6  28.1   MCHC  31.2  31.2   RDW  14 and complications of the procedure; including but not limited to bleeding, infection, trauma to GI and  tracts. All questions were answered.     Cindy Mora  12/7/2018  3:40 AM

## 2018-12-07 NOTE — PLAN OF CARE
GASTROINTESTINAL - ADULT    • Minimal or absence of nausea and vomiting Progressing    • Maintains or returns to baseline bowel function Progressing        GENITOURINARY - ADULT    • Absence of urinary retention Progressing        Integumentary status not

## 2018-12-07 NOTE — PROGRESS NOTES
0154: Per radiologist, noted to have a fluid collection measuring up to 7.6 cm in pelvis. Post op hematoma, possible infection can't be excluded. Dr. Frieda Velázquez paged. Waiting for call back. 12: Updated Dr. Frieda Velázquez on US results and sepsis BPA.  Orders for NP

## 2018-12-07 NOTE — ANESTHESIA POSTPROCEDURE EVALUATION
[facility] Patient Status:  Inpatient   Age/Gender 51year old female MRN [id number]   Location [facility] Attending Fe Means MD   Hosp Day # 1 PCP Evaristo Thao MD       Anesthesia Post-o

## 2018-12-07 NOTE — PAYOR COMM NOTE
--------------  ADMISSION REVIEW     Payor: [address] [place] [address] CLAUDY/ELOISA  Subscriber #:  [id number]  Authorization Number: Kevin [place] date: 12/6/18  Admit time: 2127       Admitting Physician: Nicolasa Mas MD  Attending Physician:  Radha Diallo 16, height 67\", weight 222 lb 9.6 oz, last menstrual period 10/02/2018, SpO2 96 %    Pelvic: EG normal in office earlier today. Vaginal cuff well approximated without drainage.   ultrasound shows a 7. 5 cm fluid collection above the cuff    Lab  12/06/18 12/07/18 0528 99.5 °F (37.5 °C) 112 16 125/71 98 %   12/07/18 0232 98.7 °F (37.1 °C) 120 16 122/64 96 %   12/07/18 0019 101 °F (38.3 °C) Abnormal  — — — —     OPERATIVE REPORT   PREOPERATIVE DIAGNOSES:   1.  Pelvic Abscess  2.   S/P Robotic Hysterectomy NRD

## 2018-12-07 NOTE — PROGRESS NOTES
NURSING ADMISSION NOTE      Patient admitted via wheelchair as direct admission. Oriented to room. Safety precautions initiated. Bed in low position. Call light in reach. 2240: Dr. Mckee Champ paged regarding admission, orders received.  Per US, recom

## 2018-12-07 NOTE — ANESTHESIA PREPROCEDURE EVALUATION
PRE-OP EVALUATION    Patient Name: Christi Mason    Pre-op Diagnosis: Abdominal pain [R10.9]    Procedure(s):  TRANSVAGINAL DRAINAGE PELVIC ABCESS, ULTRASOUND GUIDED    Surgeon(s) and Role:     * Zachariah Cristina MD - Primary    Pre-op vitals revie GI/Hepatic/Renal    Negative GI/hepatic/renal ROS.                              Cardiovascular        Exercise tolerance: good     MET: >4    (+) obesity                                       Endo/Other      (+) diabetes and well controlled, type 2, not usi murmur   Dental      Dental appliance(s): veneers       Pulmonary    Pulmonary exam normal.  Breath sounds clear to auscultation bilaterally. Other findings            ASA: 2   Plan: general  NPO status verified and patient meets guidelines.

## 2018-12-08 PROCEDURE — 80170 ASSAY OF GENTAMICIN: CPT | Performed by: OBSTETRICS & GYNECOLOGY

## 2018-12-08 NOTE — CONSULTS
[facility] Dosing Service    Initial Pharmacokinetic Consult for Aminoglycoside Dosing      Edita Felix is a 51year old female who is being treated for pelvic abscess .   Pharmacy has been asked to obtain peak and trough levels and adjust gentamicin toxicity and efficacy. Pharmacy will continue to follow her. We appreciate the opportunity to assist in her care.     Barron Parekh, PharmD  12/8/2018  12:05 PM

## 2018-12-08 NOTE — PROGRESS NOTES
[facility]  Progress Note    [facility] Best Patient Status:  Inpatient    1970 MRN [id number]   [facility] 3NW-A Attending Maggie Kmuar MD   Hosp Day # 2 PCP Radha Perez MD     Subjective:  POD: 1 - feels good.   No va

## 2018-12-08 NOTE — PLAN OF CARE
Pt a&o, VSS. Active bowel sounds, reports occasional abdominal cramping,declined pain meds. Tolerating diet, denies N/V. Lap sites c/d/i. IVF infusing. Denies any needs at this time. Will continue to monitor.                            GASTROINTESTINAL - AD

## 2018-12-09 VITALS
DIASTOLIC BLOOD PRESSURE: 70 MMHG | TEMPERATURE: 98 F | HEIGHT: 67 IN | WEIGHT: 222.63 LBS | HEART RATE: 109 BPM | SYSTOLIC BLOOD PRESSURE: 109 MMHG | BODY MASS INDEX: 34.94 KG/M2 | RESPIRATION RATE: 18 BRPM | OXYGEN SATURATION: 96 %

## 2018-12-09 PROCEDURE — 82565 ASSAY OF CREATININE: CPT | Performed by: OBSTETRICS & GYNECOLOGY

## 2018-12-09 PROCEDURE — 85025 COMPLETE CBC W/AUTO DIFF WBC: CPT | Performed by: OBSTETRICS & GYNECOLOGY

## 2018-12-09 RX ORDER — CIPROFLOXACIN 500 MG/1
500 TABLET, FILM COATED ORAL EVERY 12 HOURS SCHEDULED
Status: DISCONTINUED | OUTPATIENT
Start: 2018-12-09 | End: 2018-12-09

## 2018-12-09 RX ORDER — CIPROFLOXACIN 500 MG/1
500 TABLET, FILM COATED ORAL EVERY 12 HOURS SCHEDULED
Qty: 20 TABLET | Refills: 0 | Status: SHIPPED | OUTPATIENT
Start: 2018-12-09 | End: 2019-03-15

## 2018-12-09 NOTE — PROGRESS NOTES
[facility]  Progress Note    Srini Johnson Patient Status:  Inpatient    1970 MRN [id number]   [facility] 3NW-A Attending Harry Orta MD   Hosp Day # 3 PCP Taisha Archer MD     Subjective:  POD: 1 - feels good.   No va

## 2018-12-09 NOTE — CONSULTS
[facility] Dosing Service    Follow-up Pharmacokinetic Consult for Aminoglycoside Dosing    Dorian Porter is a 51year old female who is being treated for pelvic abscess . Patient is on day 2 of gentamicin 80 mg IVPB every 8 hours.   Goal peak/trough pelvic fluid postoperative in nature. Based on the above:    1. increase gentamicin 120 mg IVPB every 8 hours (based on trough of 0.8 mcg/mL and peak of 3.5 mcg/mL and Estimated CrCl of 79.7 ml/min (based on labs from 11/29/18, Scr = 0.83)    2.  Pharmac

## 2018-12-09 NOTE — PROGRESS NOTES
NURSING DISCHARGE NOTE    Discharged Home via Wheelchair. Accompanied by Spouse  Belongings Taken by patient/family.     All discharge instructions reviewed, voiced understanding

## 2018-12-11 NOTE — DISCHARGE SUMMARY
[facility]  Discharge Summary    Christianbrigette Do Patient Status:  Inpatient    1970 MRN [id number]   [facility] 3NW-A Attending No att. providers found   Hosp Day # 3 PCP Chuyita Urrutia MD     Date of Admission: 2018    Date (VITAMIN D) 1000 UNITS Oral Tab  Take 1,000 Units by mouth daily. , Historical    Acidophilus/Pectin Oral Cap  Take 1 capsule by mouth daily. , Historical          Diet:  General    Activity:  Pelvic Rest and  routine post. Operative precautions    Norco p

## 2018-12-12 NOTE — PAYOR COMM NOTE
--------------  CONTINUED STAY REVIEW    Payor: Musa LOCO/ELOISA  Subscriber #:  [id number]  Authorization Number: Kody Jassombard date: 12/6/18  Admit time: 2127 12/8 POD # 1:    Subjective:  POD: 1 - feels good. No vaginal bleeding.   Tolerating

## 2018-12-12 NOTE — PAYOR COMM NOTE
--------------  DISCHARGE REVIEW    Payor: Ruby ONOFRE  Subscriber #:  [id number]  Authorization Number: Juju Rash date: 12/6/18  Admit time:  2127  Discharge Date: 12/9/2018 11:51 AM       [facility]  Discharge Summary    Arturo MONTOYA night, followed by moisturizer, Normal, Disp-45 g, R-3    HYDROcodone-acetaminophen 5-325 MG Oral Tab  Take 1-2 tablets by mouth every 4 (four) hours as needed. , Print, Disp-20 tablet, R-0    Cholecalciferol (VITAMIN D) 1000 UNITS Oral Tab  Take 1,000 Unit

## 2019-01-10 PROBLEM — D21.9 FIBROIDS: Status: RESOLVED | Noted: 2018-10-10 | Resolved: 2019-01-10

## 2019-01-10 PROBLEM — N92.0 MENORRHAGIA: Status: RESOLVED | Noted: 2018-10-10 | Resolved: 2019-01-10

## 2019-01-10 PROBLEM — D25.9 FIBROID UTERUS: Status: RESOLVED | Noted: 2018-11-29 | Resolved: 2019-01-10

## 2019-01-17 PROCEDURE — 88305 TISSUE EXAM BY PATHOLOGIST: CPT

## 2019-01-18 ENCOUNTER — APPOINTMENT (OUTPATIENT)
Dept: LAB | Age: 49
End: 2019-01-18
Attending: SURGERY
Payer: COMMERCIAL

## 2019-01-18 DIAGNOSIS — D22.0 NEVUS OF SKIN OF LIP: ICD-10-CM

## 2019-03-12 ENCOUNTER — PATIENT OUTREACH (OUTPATIENT)
Dept: INTERNAL MEDICINE CLINIC | Facility: CLINIC | Age: 49
End: 2019-03-12

## 2019-03-15 ENCOUNTER — HOSPITAL ENCOUNTER (OUTPATIENT)
Age: 49
Discharge: HOME | End: 2019-03-15
Payer: COMMERCIAL

## 2019-03-15 VITALS
SYSTOLIC BLOOD PRESSURE: 130 MMHG | DIASTOLIC BLOOD PRESSURE: 80 MMHG | OXYGEN SATURATION: 98 % | RESPIRATION RATE: 18 BRPM | TEMPERATURE: 99 F | HEART RATE: 92 BPM

## 2019-03-15 DIAGNOSIS — N39.0 ACUTE URINARY TRACT INFECTION: Primary | ICD-10-CM

## 2019-03-15 LAB
POCT BILIRUBIN URINE: NEGATIVE
POCT GLUCOSE URINE: NEGATIVE MG/DL
POCT NITRITE URINE: POSITIVE
POCT PH URINE: 5.5 (ref 5–8)
POCT SPECIFIC GRAVITY URINE: 1.03
POCT URINE COLOR: YELLOW
POCT UROBILINOGEN URINE: 0.2 MG/DL

## 2019-03-15 PROCEDURE — 87086 URINE CULTURE/COLONY COUNT: CPT | Performed by: NURSE PRACTITIONER

## 2019-03-15 PROCEDURE — 87088 URINE BACTERIA CULTURE: CPT | Performed by: NURSE PRACTITIONER

## 2019-03-15 PROCEDURE — 99214 OFFICE O/P EST MOD 30 MIN: CPT

## 2019-03-15 PROCEDURE — 87186 SC STD MICRODIL/AGAR DIL: CPT | Performed by: NURSE PRACTITIONER

## 2019-03-15 PROCEDURE — 81002 URINALYSIS NONAUTO W/O SCOPE: CPT | Performed by: NURSE PRACTITIONER

## 2019-03-15 RX ORDER — FLUCONAZOLE 150 MG/1
100 TABLET ORAL ONCE
Qty: 1 TABLET | Refills: 0 | Status: SHIPPED | OUTPATIENT
Start: 2019-03-15 | End: 2019-03-15

## 2019-03-15 RX ORDER — SULFAMETHOXAZOLE AND TRIMETHOPRIM 800; 160 MG/1; MG/1
1 TABLET ORAL 2 TIMES DAILY
Qty: 14 TABLET | Refills: 0 | Status: SHIPPED | OUTPATIENT
Start: 2019-03-15 | End: 2019-03-22

## 2019-03-15 RX ORDER — FLUCONAZOLE 150 MG/1
150 TABLET ORAL ONCE
Qty: 1 TABLET | Refills: 0 | Status: SHIPPED | OUTPATIENT
Start: 2019-03-15 | End: 2019-03-15

## 2019-03-15 NOTE — ED PROVIDER NOTES
Patient Seen in: [address] [facility]    History   Patient presents with:  Urinary Symptoms (urologic)    Stated Complaint: BLADDER INFECTION    39-year-old female presents today with complaints of dysuria and urgency.   States that times feels History    Tobacco Use      Smoking status: Former Smoker        Packs/day: 0.25        Years: 15.00        Pack years: 3.75        Types: Cigarettes        Quit date: 1/1/2004        Years since quitting: 15.2      Smokeless tobacco: Never Used    Alcohol CULTURE, ROUTINE                MDM     Patient presented today with urinary symptoms. Urine dip did show positive nitrates as well as small leukocytes and blood. We will treat for UTI. Patient given prescription for Bactrim to take as directed.   Will a

## 2019-04-01 ENCOUNTER — APPOINTMENT (OUTPATIENT)
Dept: CT IMAGING | Age: 49
End: 2019-04-01
Attending: EMERGENCY MEDICINE
Payer: COMMERCIAL

## 2019-04-01 ENCOUNTER — HOSPITAL ENCOUNTER (EMERGENCY)
Age: 49
Discharge: HOME | End: 2019-04-01
Attending: EMERGENCY MEDICINE
Payer: COMMERCIAL

## 2019-04-01 VITALS
WEIGHT: 220 LBS | TEMPERATURE: 99 F | OXYGEN SATURATION: 96 % | BODY MASS INDEX: 34.53 KG/M2 | HEIGHT: 67 IN | SYSTOLIC BLOOD PRESSURE: 144 MMHG | HEART RATE: 108 BPM | DIASTOLIC BLOOD PRESSURE: 102 MMHG | RESPIRATION RATE: 16 BRPM

## 2019-04-01 DIAGNOSIS — K52.9 GASTROENTERITIS: Primary | ICD-10-CM

## 2019-04-01 PROCEDURE — 80053 COMPREHEN METABOLIC PANEL: CPT | Performed by: EMERGENCY MEDICINE

## 2019-04-01 PROCEDURE — 96374 THER/PROPH/DIAG INJ IV PUSH: CPT | Performed by: EMERGENCY MEDICINE

## 2019-04-01 PROCEDURE — 96361 HYDRATE IV INFUSION ADD-ON: CPT | Performed by: EMERGENCY MEDICINE

## 2019-04-01 PROCEDURE — 81001 URINALYSIS AUTO W/SCOPE: CPT | Performed by: EMERGENCY MEDICINE

## 2019-04-01 PROCEDURE — 96372 THER/PROPH/DIAG INJ SC/IM: CPT | Performed by: EMERGENCY MEDICINE

## 2019-04-01 PROCEDURE — 74176 CT ABD & PELVIS W/O CONTRAST: CPT | Performed by: EMERGENCY MEDICINE

## 2019-04-01 PROCEDURE — 85025 COMPLETE CBC W/AUTO DIFF WBC: CPT | Performed by: EMERGENCY MEDICINE

## 2019-04-01 PROCEDURE — 99284 EMERGENCY DEPT VISIT MOD MDM: CPT | Performed by: EMERGENCY MEDICINE

## 2019-04-01 PROCEDURE — S0028 INJECTION, FAMOTIDINE, 20 MG: HCPCS | Performed by: EMERGENCY MEDICINE

## 2019-04-01 PROCEDURE — 83690 ASSAY OF LIPASE: CPT | Performed by: EMERGENCY MEDICINE

## 2019-04-01 PROCEDURE — 96375 TX/PRO/DX INJ NEW DRUG ADDON: CPT | Performed by: EMERGENCY MEDICINE

## 2019-04-01 RX ORDER — DICYCLOMINE HCL 20 MG
20 TABLET ORAL 4 TIMES DAILY PRN
Qty: 30 TABLET | Refills: 0 | Status: SHIPPED | OUTPATIENT
Start: 2019-04-01 | End: 2019-05-01

## 2019-04-01 RX ORDER — ONDANSETRON 2 MG/ML
4 INJECTION INTRAMUSCULAR; INTRAVENOUS ONCE
Status: COMPLETED | OUTPATIENT
Start: 2019-04-01 | End: 2019-04-01

## 2019-04-01 RX ORDER — KETOROLAC TROMETHAMINE 30 MG/ML
15 INJECTION, SOLUTION INTRAMUSCULAR; INTRAVENOUS ONCE
Status: COMPLETED | OUTPATIENT
Start: 2019-04-01 | End: 2019-04-01

## 2019-04-01 RX ORDER — ONDANSETRON 4 MG/1
4 TABLET, ORALLY DISINTEGRATING ORAL EVERY 4 HOURS PRN
Qty: 10 TABLET | Refills: 0 | Status: SHIPPED | OUTPATIENT
Start: 2019-04-01 | End: 2019-04-08

## 2019-04-01 RX ORDER — HYDROMORPHONE HYDROCHLORIDE 1 MG/ML
0.5 INJECTION, SOLUTION INTRAMUSCULAR; INTRAVENOUS; SUBCUTANEOUS ONCE
Status: COMPLETED | OUTPATIENT
Start: 2019-04-01 | End: 2019-04-01

## 2019-04-01 RX ORDER — FAMOTIDINE 10 MG/ML
20 INJECTION, SOLUTION INTRAVENOUS ONCE
Status: COMPLETED | OUTPATIENT
Start: 2019-04-01 | End: 2019-04-01

## 2019-04-01 RX ORDER — DICYCLOMINE HYDROCHLORIDE 10 MG/ML
10 INJECTION INTRAMUSCULAR ONCE
Status: COMPLETED | OUTPATIENT
Start: 2019-04-01 | End: 2019-04-01

## 2019-04-01 RX ORDER — DIPHENHYDRAMINE HYDROCHLORIDE 50 MG/ML
25 INJECTION INTRAMUSCULAR; INTRAVENOUS ONCE
Status: COMPLETED | OUTPATIENT
Start: 2019-04-01 | End: 2019-04-01

## 2019-04-01 RX ORDER — FAMOTIDINE 40 MG/1
40 TABLET, FILM COATED ORAL 2 TIMES DAILY PRN
Qty: 30 TABLET | Refills: 0 | Status: SHIPPED | OUTPATIENT
Start: 2019-04-01 | End: 2019-05-01

## 2019-04-01 RX ORDER — METOCLOPRAMIDE HYDROCHLORIDE 5 MG/ML
10 INJECTION INTRAMUSCULAR; INTRAVENOUS ONCE
Status: COMPLETED | OUTPATIENT
Start: 2019-04-01 | End: 2019-04-01

## 2019-04-01 NOTE — ED INITIAL ASSESSMENT (HPI)
Since 10 pm, has had generalized abd pain , emesis x 1 and diarrhea with belching. Developed bilateral lower back pain about 0400. Had UTI 2 weeks ago , was placed on Bactrim and had GI upset with it.  Pain seems to now be located to right lower back

## 2019-04-01 NOTE — ED PROVIDER NOTES
Patient Seen in: [facility] Emergency Department In [place]    History   Patient presents with:  Abdomen/Flank Pain (GI/)  Back Pain (musculoskeletal)    Stated Complaint: abd and back pain , vomiting    HPI    Patient presents with abdomen and flank pa at [facility] MAIN OR           Social History    Tobacco Use      Smoking status: Former Smoker        Packs/day: 0.25        Years: 15.00        Pack years: 3.75        Types: Cigarettes        Quit date: 1/1/2004        Years since quitting: 15.2      Smokeless components:    WBC 14.8 (*)     RDW-SD 47.5 (*)     Neutrophil Absolute Prelim 9.97 (*)     Neutrophil Absolute 9.97 (*)     All other components within normal limits   URINE MICROSCOPIC W REFLEX CULTURE - Normal   LIPASE - Normal   CBC WITH DIFFERENTIAL W Index Registry. PATIENT STATED HISTORY: (As transcribed by Technologist)  Patient has had epigastric pain since 2200 hrs last night and pain to the left lower lateral back region since 0400 hrs. Patient also had nausea and vomiting this morning.    FINDING Dicyclomine HCl (BENTYL) 10 MG/ML injection 1 mL (1 mL Intramuscular Given 4/1/19 0841)     Patient was given IV fluids, Toradol and Zofran. She complained of more pain in both her back and stomach. She was given Dilaudid additionally but then vomited. 0

## 2019-05-02 ENCOUNTER — LAB ENCOUNTER (OUTPATIENT)
Dept: LAB | Age: 49
End: 2019-05-02
Attending: INTERNAL MEDICINE
Payer: COMMERCIAL

## 2019-05-02 ENCOUNTER — OFFICE VISIT (OUTPATIENT)
Dept: INTERNAL MEDICINE CLINIC | Facility: CLINIC | Age: 49
End: 2019-05-02
Payer: COMMERCIAL

## 2019-05-02 ENCOUNTER — TELEPHONE (OUTPATIENT)
Dept: INTERNAL MEDICINE CLINIC | Facility: CLINIC | Age: 49
End: 2019-05-02

## 2019-05-02 VITALS
RESPIRATION RATE: 16 BRPM | BODY MASS INDEX: 34.84 KG/M2 | DIASTOLIC BLOOD PRESSURE: 100 MMHG | HEART RATE: 88 BPM | HEIGHT: 67.75 IN | WEIGHT: 227.25 LBS | TEMPERATURE: 98 F | SYSTOLIC BLOOD PRESSURE: 140 MMHG

## 2019-05-02 DIAGNOSIS — E11.8 TYPE 2 DIABETES MELLITUS WITH COMPLICATION, WITHOUT LONG-TERM CURRENT USE OF INSULIN (HCC): ICD-10-CM

## 2019-05-02 DIAGNOSIS — Z00.00 BLOOD TESTS FOR ROUTINE GENERAL PHYSICAL EXAMINATION: ICD-10-CM

## 2019-05-02 DIAGNOSIS — M25.551 BILATERAL HIP PAIN: ICD-10-CM

## 2019-05-02 DIAGNOSIS — M25.552 BILATERAL HIP PAIN: ICD-10-CM

## 2019-05-02 DIAGNOSIS — E04.1 THYROID NODULE: ICD-10-CM

## 2019-05-02 DIAGNOSIS — Z00.00 ENCOUNTER FOR ROUTINE ADULT MEDICAL EXAMINATION: Primary | ICD-10-CM

## 2019-05-02 DIAGNOSIS — F40.243 FEAR OF FLYING: ICD-10-CM

## 2019-05-02 DIAGNOSIS — M25.561 CHRONIC PAIN OF RIGHT KNEE: ICD-10-CM

## 2019-05-02 DIAGNOSIS — R16.0 HEPATOMEGALY: ICD-10-CM

## 2019-05-02 DIAGNOSIS — M54.2 CHRONIC NECK PAIN: ICD-10-CM

## 2019-05-02 DIAGNOSIS — R03.0 ELEVATED BLOOD PRESSURE READING: ICD-10-CM

## 2019-05-02 DIAGNOSIS — G89.29 CHRONIC PAIN OF RIGHT KNEE: ICD-10-CM

## 2019-05-02 DIAGNOSIS — K76.0 FATTY INFILTRATION OF LIVER: ICD-10-CM

## 2019-05-02 DIAGNOSIS — G89.29 CHRONIC NECK PAIN: ICD-10-CM

## 2019-05-02 PROCEDURE — 85025 COMPLETE CBC W/AUTO DIFF WBC: CPT | Performed by: INTERNAL MEDICINE

## 2019-05-02 PROCEDURE — 99396 PREV VISIT EST AGE 40-64: CPT | Performed by: INTERNAL MEDICINE

## 2019-05-02 PROCEDURE — 82043 UR ALBUMIN QUANTITATIVE: CPT | Performed by: INTERNAL MEDICINE

## 2019-05-02 PROCEDURE — 82570 ASSAY OF URINE CREATININE: CPT | Performed by: INTERNAL MEDICINE

## 2019-05-02 PROCEDURE — 83036 HEMOGLOBIN GLYCOSYLATED A1C: CPT | Performed by: INTERNAL MEDICINE

## 2019-05-02 PROCEDURE — 80061 LIPID PANEL: CPT | Performed by: INTERNAL MEDICINE

## 2019-05-02 PROCEDURE — 36415 COLL VENOUS BLD VENIPUNCTURE: CPT | Performed by: INTERNAL MEDICINE

## 2019-05-02 RX ORDER — SCOLOPAMINE TRANSDERMAL SYSTEM 1 MG/1
1 PATCH, EXTENDED RELEASE TRANSDERMAL
Qty: 8 PATCH | Refills: 0 | Status: SHIPPED | OUTPATIENT
Start: 2019-05-02 | End: 2019-10-28

## 2019-05-02 RX ORDER — LACTOBACIL 2/BIFIDO 1/S.THERMO 450B CELL
1 PACKET (EA) ORAL DAILY
Qty: 30 EACH | Refills: 5 | Status: SHIPPED | OUTPATIENT
Start: 2019-05-02 | End: 2019-10-28

## 2019-05-02 RX ORDER — ALPRAZOLAM 0.25 MG/1
0.25 TABLET ORAL 2 TIMES DAILY PRN
Qty: 10 TABLET | Refills: 0 | Status: SHIPPED | OUTPATIENT
Start: 2019-05-02 | End: 2019-11-19

## 2019-05-02 NOTE — PATIENT INSTRUCTIONS
VSL#3 - go on website for savings card  [email or web address] [email or web address]  (can google \"VSL savings\")    Blood work today      Call to make appointment with Dr. Acevedo/gastroenterology     Call to schedule physical therapy

## 2019-05-02 NOTE — PROGRESS NOTES
[facility] Internal Medicine Office Note  Chief Complaint:   Patient presents with:  Diabetes  Anxiety: Pt states she is traveling next week, would like some xanax for flight and some antinausea meds for when she goes on cruise   Other: HM: Shahrzad Packs/day: 0.25        Years: 15.00        Pack years: 3.75        Types: Cigarettes        Quit date: 1/1/2004        Years since quitting: 15.3      Smokeless tobacco: Never Used    Alcohol use: Yes      Frequency: 2-4 times a month      Drinks per sessi Constitutional: Negative for fever. HENT: Negative for congestion. Eyes: Negative for visual disturbance. Respiratory: Negative for shortness of breath. Cardiovascular: Negative for chest pain. Gastrointestinal: Positive for heartburn.  Magda Waterman flying      The plan is as follows  Perla Monson was seen today for diabetes, anxiety and other.     Diagnoses and all orders for this visit:    Encounter for routine adult medical examination - reviewed recent CT abd/pelvis and CBC/CMP done in hospital - WBC el Urine [E]      CBC W Differential W Platelet [E]      Meds & Refills for this Visit:  Requested Prescriptions     Signed Prescriptions Disp Refills   • metFORMIN HCl 500 MG Oral Tab 90 tablet 3     Sig: Take 1 tablet (500 mg total) by mouth daily.    • Scop

## 2019-05-03 ENCOUNTER — TELEPHONE (OUTPATIENT)
Dept: INTERNAL MEDICINE CLINIC | Facility: CLINIC | Age: 49
End: 2019-05-03

## 2019-05-03 NOTE — TELEPHONE ENCOUNTER
Notes recorded by Minerva Payne MD on 5/3/2019 at 1:54 PM CDT  Increase metformin to 500mg twice daily as A1c is high at 7.9  Cholesterol is very high - will discuss further at next appointment about starting a cholesterol medication.  LDL is 159 and goal

## 2019-05-03 NOTE — PROGRESS NOTES
Informed pt of results and recommendations per SK. Pt c/o fatigue. Taking liothyronine 5mcg daily. [facility]acy is [facility]. Per SK: no need to add ft3 to current specimen. Did you want to adjust dose d/t fatigue?

## 2019-05-06 NOTE — PROGRESS NOTES
Patient notified that thyroid levels are fine and no change in dosage, Liothyronine 5mcg refilled per patient request/lp

## 2019-06-10 ENCOUNTER — TELEPHONE (OUTPATIENT)
Dept: INTERNAL MEDICINE CLINIC | Facility: CLINIC | Age: 49
End: 2019-06-10

## 2019-06-10 DIAGNOSIS — E11.8 TYPE 2 DIABETES MELLITUS WITH COMPLICATION, WITHOUT LONG-TERM CURRENT USE OF INSULIN (HCC): Primary | ICD-10-CM

## 2019-06-10 NOTE — TELEPHONE ENCOUNTER
LOV: 5/2/19  Rx sent in per written from test results on 5/3/19. Attempted to call pt but phone rang and no answer. Phone did not go to voicemail.

## 2019-06-10 NOTE — TELEPHONE ENCOUNTER
New rx needed for metformin     [facility]    Previous rx was 1x daily [facility] does not have updated rx with 2x daily - will not refill med for pt without new rx.   Pt will be out of meds in approx 4 days

## 2019-07-12 ENCOUNTER — HOSPITAL ENCOUNTER (OUTPATIENT)
Age: 49
Discharge: HOME | End: 2019-07-12
Attending: FAMILY MEDICINE
Payer: COMMERCIAL

## 2019-07-12 VITALS
TEMPERATURE: 98 F | WEIGHT: 200 LBS | HEART RATE: 108 BPM | DIASTOLIC BLOOD PRESSURE: 80 MMHG | RESPIRATION RATE: 16 BRPM | BODY MASS INDEX: 31.39 KG/M2 | OXYGEN SATURATION: 97 % | HEIGHT: 67 IN | SYSTOLIC BLOOD PRESSURE: 140 MMHG

## 2019-07-12 DIAGNOSIS — H65.03 BILATERAL ACUTE SEROUS OTITIS MEDIA, RECURRENCE NOT SPECIFIED: Primary | ICD-10-CM

## 2019-07-12 PROCEDURE — 99214 OFFICE O/P EST MOD 30 MIN: CPT

## 2019-07-12 PROCEDURE — 99213 OFFICE O/P EST LOW 20 MIN: CPT

## 2019-07-12 RX ORDER — METHYLPREDNISOLONE 4 MG/1
TABLET ORAL
Qty: 21 TABLET | Refills: 0 | Status: SHIPPED | OUTPATIENT
Start: 2019-07-12 | End: 2019-10-28

## 2019-07-12 RX ORDER — AZITHROMYCIN 250 MG/1
TABLET, FILM COATED ORAL
Qty: 6 TABLET | Refills: 0 | Status: SHIPPED | OUTPATIENT
Start: 2019-07-12 | End: 2019-10-28

## 2019-07-12 NOTE — ED PROVIDER NOTES
Patient Seen in: [address] [facility]    History   Patient presents with:  Ear Pain    Stated Complaint: ear pain    HPI    *79-year-old female presents to the immediate care today with chief complaints of fluid sensations in both ears kathleen pertinent to presenting problem.     Social History    Tobacco Use      Smoking status: Former Smoker        Packs/day: 0.25        Years: 15.00        Pack years: 3.75        Types: Cigarettes        Quit date: 1/1/2004        Years since quitting: 15.5 directed              Disposition and Plan     Clinical Impression:  Bilateral acute serous otitis media, recurrence not specified  (primary encounter diagnosis)    Disposition:  Discharge  7/12/2019  4:00 pm    Follow-up:  Chapin Joseph MD  [facility]

## 2019-07-12 NOTE — ED INITIAL ASSESSMENT (HPI)
Past 2 weeks pain and fluid in her left ear after being at a loud concert. States it pops when she swallows. Could not get into her ENT.

## 2019-10-28 PROBLEM — E66.9 OBESITY (BMI 30.0-34.9): Status: ACTIVE | Noted: 2019-10-28

## 2019-10-28 PROBLEM — E66.811 OBESITY (BMI 30.0-34.9): Status: ACTIVE | Noted: 2019-10-28

## 2019-11-19 ENCOUNTER — OFFICE VISIT (OUTPATIENT)
Dept: INTERNAL MEDICINE CLINIC | Facility: CLINIC | Age: 49
End: 2019-11-19
Payer: COMMERCIAL

## 2019-11-19 ENCOUNTER — TELEPHONE (OUTPATIENT)
Dept: INTERNAL MEDICINE CLINIC | Facility: CLINIC | Age: 49
End: 2019-11-19

## 2019-11-19 VITALS
HEART RATE: 75 BPM | OXYGEN SATURATION: 98 % | DIASTOLIC BLOOD PRESSURE: 76 MMHG | TEMPERATURE: 98 F | RESPIRATION RATE: 16 BRPM | SYSTOLIC BLOOD PRESSURE: 128 MMHG | HEIGHT: 67 IN | BODY MASS INDEX: 32.57 KG/M2 | WEIGHT: 207.5 LBS

## 2019-11-19 DIAGNOSIS — M54.9 CHRONIC UPPER BACK PAIN: ICD-10-CM

## 2019-11-19 DIAGNOSIS — G89.29 CHRONIC PAIN OF RIGHT KNEE: ICD-10-CM

## 2019-11-19 DIAGNOSIS — M25.551 RIGHT HIP PAIN: Primary | ICD-10-CM

## 2019-11-19 DIAGNOSIS — M25.561 CHRONIC PAIN OF RIGHT KNEE: ICD-10-CM

## 2019-11-19 DIAGNOSIS — Z12.11 SCREENING FOR COLON CANCER: ICD-10-CM

## 2019-11-19 DIAGNOSIS — M54.2 CHRONIC NECK PAIN: ICD-10-CM

## 2019-11-19 DIAGNOSIS — G89.29 CHRONIC NECK PAIN: ICD-10-CM

## 2019-11-19 DIAGNOSIS — G89.29 CHRONIC UPPER BACK PAIN: ICD-10-CM

## 2019-11-19 DIAGNOSIS — E78.00 PURE HYPERCHOLESTEROLEMIA: ICD-10-CM

## 2019-11-19 DIAGNOSIS — E11.9 TYPE 2 DIABETES MELLITUS WITHOUT COMPLICATION, WITHOUT LONG-TERM CURRENT USE OF INSULIN (HCC): Primary | ICD-10-CM

## 2019-11-19 PROCEDURE — 99214 OFFICE O/P EST MOD 30 MIN: CPT | Performed by: INTERNAL MEDICINE

## 2019-11-19 PROCEDURE — 83036 HEMOGLOBIN GLYCOSYLATED A1C: CPT | Performed by: INTERNAL MEDICINE

## 2019-11-19 RX ORDER — EPINEPHRINE 0.3 MG/.3ML
0.3 INJECTION, SOLUTION INTRAMUSCULAR AS NEEDED
Qty: 1 EACH | Refills: 3 | Status: SHIPPED | OUTPATIENT
Start: 2019-11-19

## 2019-11-19 NOTE — TELEPHONE ENCOUNTER
J Luis Oliveros 25 calling in. The RX AUVI-Q 0.3 MG/0.3ML Injection Solution Auto-injector is not available in the [place]. Alternative suggestion: Epi Pen. Please call Pharmacy.

## 2019-11-19 NOTE — PROGRESS NOTES
[facility] Internal Medicine Office Note  Chief Complaint:   Patient presents with:   Follow - Up: discuss A1      HPI:   This is a 47year old female coming in for follow up  HPI  DM - last a1c 7.9  On metformin once daily    She is on a supervis No    Allergies:    Wasps                   SWELLING  Coffee Bean Extract*    OTHER (SEE COMMENTS)    Comment:Per allergy testing  Corn                    UNKNOWN    Comment:Swelling, inflammation, SOB  Erythromycin            OTHER (SEE COMMENTS)    Comme SpO2 98%   BMI 32.50 kg/m²  Estimated body mass index is 32.5 kg/m² as calculated from the following:    Height as of this encounter: 67\". Weight as of this encounter: 207 lb 8 oz (94.1 kg). Vital signs reviewed. Appears stated age, well groomed.   Ph hypercholesterolemia -recheck lipids. Discussed goal of LDL is 70   -     LIPID PANEL;  Future    Screening for colon cancer -due for colon cancer screening but she is not sure about having a colonoscopy due to anesthesia   -     OCCULT BLOOD, FECAL, IMMUNO

## 2019-11-21 RX ORDER — EPINEPHRINE 0.3 MG/.3ML
0.3 INJECTION SUBCUTANEOUS ONCE
Qty: 1 EACH | Refills: 3 | Status: SHIPPED | OUTPATIENT
Start: 2019-11-21 | End: 2019-11-21

## 2019-11-30 DIAGNOSIS — E11.8 TYPE 2 DIABETES MELLITUS WITH COMPLICATION, WITHOUT LONG-TERM CURRENT USE OF INSULIN (HCC): ICD-10-CM

## 2019-12-02 NOTE — TELEPHONE ENCOUNTER
METFORMIN  MG Oral Tab    Passed Protocol    Last OV relevant to medication: 11/19/2019  Last refill date: 6/10/2019     #/refills: #90 w/ 3 refills   When pt was asked to return for OV: 3 months   Upcoming appt/reason: No future appointments   Lab Results   Component Value Date     (H) 05/02/2019    A1C 6.5 (A) 11/19/2019     Called pharmacy and they stated that patient does not have refills on file for this medication

## 2019-12-04 NOTE — TELEPHONE ENCOUNTER
Pt notified of provider response below and verbalized understanding. Pt also wanting to verify PT order has right hip as well as right knee. Advised pt it just stated right knee, upper back, and neck.  Pt wanting to know if right hip can be added to orde

## 2019-12-05 NOTE — TELEPHONE ENCOUNTER
No need for 2nd referral. Right hip pain added to existing referral. Cancelled referral entered today.

## 2019-12-10 ENCOUNTER — OFFICE VISIT (OUTPATIENT)
Dept: PHYSICAL THERAPY | Age: 49
End: 2019-12-10
Attending: INTERNAL MEDICINE
Payer: COMMERCIAL

## 2019-12-10 DIAGNOSIS — G89.29 CHRONIC PAIN OF RIGHT KNEE: ICD-10-CM

## 2019-12-10 DIAGNOSIS — M25.551 BILATERAL HIP PAIN: ICD-10-CM

## 2019-12-10 DIAGNOSIS — M25.552 BILATERAL HIP PAIN: ICD-10-CM

## 2019-12-10 DIAGNOSIS — M25.561 CHRONIC PAIN OF RIGHT KNEE: ICD-10-CM

## 2019-12-10 DIAGNOSIS — G89.29 CHRONIC NECK PAIN: ICD-10-CM

## 2019-12-10 DIAGNOSIS — M54.2 CHRONIC NECK PAIN: ICD-10-CM

## 2019-12-10 PROCEDURE — 97162 PT EVAL MOD COMPLEX 30 MIN: CPT

## 2019-12-10 NOTE — PROGRESS NOTES
INITIAL EVALUATION:   Referring Physician: Dr. Bro Mallory  Diagnosis:    -Right hip pain    -Chronic pain of right knee   -Chronic neck pain   -Chronic upper back pain    Date of Service: 12/10/2019     PATIENT Tressa Butler is a 47year old f generalized stiffness in am; shares history of vestibular disorder; describes what sounds like peripheral vestibular hypofunction; describes caloric testing; was seen by physical therapists; denies current vertigo or balance issues.  -Knee:  Denies pain or hip flexion and lower lumbar rotation; comparable hip pain is present within right stance with subtle right lateral trunk flexion  Functional assessment:    -Stairs is performed without hand rail; step over step fashion with comparable hip pain with ascent present with various pain complaints.   Right \"hip\" pain is located in trochanteric region; did not appear of lumbar referral based on active motion assessment and accessory motion assessment; would appear likely to be of intra-articular origin; suggestiv 4/5 R gluteus medius strength with pain during resistance testing  -Able to walk for periods of at least 1 hour without pain complaint  -Able to attending to prolonged standing tasks of at least 1 hour without pain complaint  -At least 50% reduction of jessica

## 2019-12-12 ENCOUNTER — LAB ENCOUNTER (OUTPATIENT)
Dept: LAB | Age: 49
End: 2019-12-12
Attending: STUDENT IN AN ORGANIZED HEALTH CARE EDUCATION/TRAINING PROGRAM
Payer: COMMERCIAL

## 2019-12-12 ENCOUNTER — TELEPHONE (OUTPATIENT)
Dept: INTERNAL MEDICINE CLINIC | Facility: CLINIC | Age: 49
End: 2019-12-12

## 2019-12-12 ENCOUNTER — OFFICE VISIT (OUTPATIENT)
Dept: PHYSICAL THERAPY | Age: 49
End: 2019-12-12
Attending: INTERNAL MEDICINE
Payer: COMMERCIAL

## 2019-12-12 DIAGNOSIS — E78.00 PURE HYPERCHOLESTEROLEMIA: ICD-10-CM

## 2019-12-12 DIAGNOSIS — G89.29 CHRONIC NECK PAIN: ICD-10-CM

## 2019-12-12 DIAGNOSIS — K76.0 NAFLD (NONALCOHOLIC FATTY LIVER DISEASE): ICD-10-CM

## 2019-12-12 DIAGNOSIS — E04.1 NONTOXIC THYROID NODULE: ICD-10-CM

## 2019-12-12 DIAGNOSIS — M25.552 BILATERAL HIP PAIN: ICD-10-CM

## 2019-12-12 DIAGNOSIS — G89.29 CHRONIC PAIN OF RIGHT KNEE: ICD-10-CM

## 2019-12-12 DIAGNOSIS — M54.2 CHRONIC NECK PAIN: ICD-10-CM

## 2019-12-12 DIAGNOSIS — M25.551 BILATERAL HIP PAIN: ICD-10-CM

## 2019-12-12 DIAGNOSIS — R16.0 HEPATOMEGALY: ICD-10-CM

## 2019-12-12 DIAGNOSIS — M25.561 CHRONIC PAIN OF RIGHT KNEE: ICD-10-CM

## 2019-12-12 PROCEDURE — 97110 THERAPEUTIC EXERCISES: CPT

## 2019-12-12 PROCEDURE — 80061 LIPID PANEL: CPT | Performed by: INTERNAL MEDICINE

## 2019-12-12 RX ORDER — ALPRAZOLAM 0.25 MG/1
0.25 TABLET ORAL 2 TIMES DAILY PRN
Qty: 14 TABLET | Refills: 0 | Status: SHIPPED | OUTPATIENT
Start: 2019-12-12 | End: 2020-02-17

## 2019-12-12 NOTE — TELEPHONE ENCOUNTER
Patient leaves for a trip on this Saturday and she was driving but is not flying with multiple connection flights.   She is requesting Xanax rx please from doctor to [facility] in [place]

## 2019-12-12 NOTE — PROGRESS NOTES
Dx:        -Right hip pain    -Chronic pain of right knee   -Chronic neck pain   -Chronic upper back pain     Authorized # of Visits:  No prior authorization is required per appointment notes     Next MD visit: none scheduled  Fall Risk: standard INITIAL EVALUATION:   Referring Physician: Dr. Bro Mallory  Diagnosis:    -Right hip pain    -Chronic pain of right knee   -Chronic neck pain   -Chronic upper back pain    Date of Service: 12/10/2019     PATIENT Tressa Butler is a 47year old fema generalized stiffness in am; shares history of vestibular disorder; describes what sounds like peripheral vestibular hypofunction; describes caloric testing; was seen by physical therapists; denies current vertigo or balance issues.  -Knee:  Denies pain or hip flexion and lower lumbar rotation; comparable hip pain is present within right stance with subtle right lateral trunk flexion  Functional assessment:    -Stairs is performed without hand rail; step over step fashion with comparable hip pain with ascent present with various pain complaints.   Right \"hip\" pain is located in trochanteric region; did not appear of lumbar referral based on active motion assessment and accessory motion assessment; would appear likely to be of intra-articular origin; suggestiv 4/5 R gluteus medius strength with pain during resistance testing  -Able to walk for periods of at least 1 hour without pain complaint  -Able to attending to prolonged standing tasks of at least 1 hour without pain complaint  -At least 50% reduction of jessica

## 2019-12-13 NOTE — PROGRESS NOTES
Please let patient know that labs look fine. There is still a mild elevation in ALT (for females, ideally should less than 30), which suggests mild chronic inflammation from fatty liver, but no other issues identified.   She should continue with weight los

## 2019-12-13 NOTE — PROGRESS NOTES
Informed pt of results and recommendations per SK, verb understanding. Refilled rx and placed future lab order. U/s thyroid due 2019. Order . Placed new order.

## 2019-12-18 ENCOUNTER — OFFICE VISIT (OUTPATIENT)
Dept: INTERNAL MEDICINE CLINIC | Facility: CLINIC | Age: 49
End: 2019-12-18
Payer: COMMERCIAL

## 2019-12-18 VITALS
SYSTOLIC BLOOD PRESSURE: 120 MMHG | HEART RATE: 78 BPM | RESPIRATION RATE: 16 BRPM | DIASTOLIC BLOOD PRESSURE: 80 MMHG | BODY MASS INDEX: 31.39 KG/M2 | HEIGHT: 67 IN | WEIGHT: 200 LBS

## 2019-12-18 DIAGNOSIS — E66.9 OBESITY (BMI 30.0-34.9): ICD-10-CM

## 2019-12-18 DIAGNOSIS — Z51.81 THERAPEUTIC DRUG MONITORING: ICD-10-CM

## 2019-12-18 DIAGNOSIS — M25.551 HIP PAIN, CHRONIC, RIGHT: ICD-10-CM

## 2019-12-18 DIAGNOSIS — E11.8 TYPE 2 DIABETES MELLITUS WITH COMPLICATION, WITHOUT LONG-TERM CURRENT USE OF INSULIN (HCC): Primary | ICD-10-CM

## 2019-12-18 DIAGNOSIS — G89.29 HIP PAIN, CHRONIC, RIGHT: ICD-10-CM

## 2019-12-18 DIAGNOSIS — K76.0 FATTY LIVER: ICD-10-CM

## 2019-12-18 PROCEDURE — 99214 OFFICE O/P EST MOD 30 MIN: CPT | Performed by: INTERNAL MEDICINE

## 2019-12-18 RX ORDER — SEMAGLUTIDE 1.34 MG/ML
0.5 INJECTION, SOLUTION SUBCUTANEOUS WEEKLY
Qty: 1 PEN | Refills: 1 | Status: SHIPPED | OUTPATIENT
Start: 2019-12-18 | End: 2020-02-17

## 2019-12-18 NOTE — PROGRESS NOTES
HISTORY OF PRESENT ILLNESS  Patient presents with:  Weight Problem: ref Dr Bandar Vargas, tried arsenio diet      Tere Lyle is a 47year old female new to our office today for initiation of medical weight loss program.  Patient presents today with c/o exc DVT: negative    Family or personal history of Pancreatic issues / Medullary Thyroid Cancer: negative    History of bariatric surgery: negative     [address] reviewed: obesity in parent/s or sibling: yes     REVIEW OF SYSTEMS  GENERAL: feels well otherwise,   NE AST 19 12/12/2019    ALT 44 12/12/2019    BILT 0.4 12/12/2019    TP 7.5 12/12/2019    ALB 3.9 12/12/2019    GLOBULIN 3.6 12/12/2019     12/12/2019    K 3.7 12/12/2019     12/12/2019    CO2 24.0 12/12/2019     Lab Results   Component Value Date Type 2 diabetes mellitus with complication, without long-term current use of insulin (HCC)  -     OP REFERRAL TO [facility] ([facility] USE ONLY)  -     VITAMIN D, 25-HYDROXY; Future  -     VITAMIN B12; Future    Obesity (BMI 30.0-34.9)  -     OP REFERRAL Continue with medications:   START OZEMPIC AT 0.25 MG WEEKLY X 2 WEEKS, IF HAVING SIDE EFFECTS DO NOT INCREASE DOSE. IF TOLERATING DOSE WELL AND LOSING WEIGHT  DO NOT INCREASE DOSE.    IF TOLERATING MEDICATION AND NOT LOSING WEIGHT INCREASE TO 0.5 MG WEEKLY -cottage cheese  -plain yogurt  -kefir  -hard-boiled eggs  -natural cheeses  -nuts (measure portion size)   -unsweetened nut butters  -dried edamame   -mj seeds soaked in water or almond milk  -soy nuts  -cured meats (monitor for sodium issues)   -prosper

## 2019-12-18 NOTE — PATIENT INSTRUCTIONS
Plan:  Continue with medications:   START OZEMPIC AT 0.25 MG WEEKLY X 2 WEEKS, IF HAVING SIDE EFFECTS DO NOT INCREASE DOSE. IF TOLERATING DOSE WELL AND LOSING WEIGHT  DO NOT INCREASE DOSE.    IF TOLERATING MEDICATION AND NOT LOSING WEIGHT INCREASE TO 0.5 MG HIGH PROTEIN SNACK IDEAS  -cottage cheese  -plain yogurt  -kefir  -hard-boiled eggs  -natural cheeses  -nuts (measure portion size)   -unsweetened nut butters  -dried edamame   -mj seeds soaked in water or almond milk  -soy nuts  -cured meats (monitor fo

## 2019-12-18 NOTE — PROGRESS NOTES
Patient teaching on Ozempic performed. Patient demonstrated back, all questions were answered and patient verbalized understanding.  WILMER

## 2019-12-19 ENCOUNTER — TELEPHONE (OUTPATIENT)
Dept: INTERNAL MEDICINE CLINIC | Facility: CLINIC | Age: 49
End: 2019-12-19

## 2019-12-19 PROBLEM — M25.551 HIP PAIN, CHRONIC, RIGHT: Status: ACTIVE | Noted: 2019-12-19

## 2019-12-19 PROBLEM — G89.29 HIP PAIN, CHRONIC, RIGHT: Status: ACTIVE | Noted: 2019-12-19

## 2019-12-20 ENCOUNTER — OFFICE VISIT (OUTPATIENT)
Dept: PHYSICAL THERAPY | Age: 49
End: 2019-12-20
Attending: INTERNAL MEDICINE
Payer: COMMERCIAL

## 2019-12-20 DIAGNOSIS — M25.551 BILATERAL HIP PAIN: ICD-10-CM

## 2019-12-20 DIAGNOSIS — G89.29 CHRONIC PAIN OF RIGHT KNEE: ICD-10-CM

## 2019-12-20 DIAGNOSIS — M54.2 CHRONIC NECK PAIN: ICD-10-CM

## 2019-12-20 DIAGNOSIS — M25.561 CHRONIC PAIN OF RIGHT KNEE: ICD-10-CM

## 2019-12-20 DIAGNOSIS — G89.29 CHRONIC NECK PAIN: ICD-10-CM

## 2019-12-20 DIAGNOSIS — M25.552 BILATERAL HIP PAIN: ICD-10-CM

## 2019-12-20 PROCEDURE — 97110 THERAPEUTIC EXERCISES: CPT

## 2019-12-20 NOTE — PROGRESS NOTES
Dx:        -Right hip pain    -Chronic pain of right knee   -Chronic neck pain   -Chronic upper back pain     Authorized # of Visits:  No prior authorization is required per appointment notes     Next MD visit: none scheduled  Fall Risk: standard day  -Demonstrate understanding of home program and progression  -Able to resume her prior regular exercise routine without issue    Plan: Review supine abdominal tasks to enhance performance and gauge adherence with home program; progress with resisted lo also shares her history of fibroid surgery with complication secondary to infection and period of recovery about 1 year ago; 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     Palpation:  -The TFL, Deep hip rotors, glut med is un assessed but likely of mobility classification with associated impairments in posture and muscle performance; does present with past medication history of vestibular disturbance; complaints lf knee clicking - deferred assessment and will follow; will likel 12 visits  -Impairment based exercise progression with emphasis on hip abductors   -Motor control and trunk (back extensor/abdominal focuses) strengthening exercise  -Postural based activities and impairment based cervical and scapular strengthening  -Furedison

## 2019-12-23 ENCOUNTER — OFFICE VISIT (OUTPATIENT)
Dept: PHYSICAL THERAPY | Age: 49
End: 2019-12-23
Attending: INTERNAL MEDICINE
Payer: COMMERCIAL

## 2019-12-23 DIAGNOSIS — M25.551 BILATERAL HIP PAIN: ICD-10-CM

## 2019-12-23 DIAGNOSIS — M25.561 CHRONIC PAIN OF RIGHT KNEE: ICD-10-CM

## 2019-12-23 DIAGNOSIS — G89.29 CHRONIC NECK PAIN: ICD-10-CM

## 2019-12-23 DIAGNOSIS — G89.29 CHRONIC PAIN OF RIGHT KNEE: ICD-10-CM

## 2019-12-23 DIAGNOSIS — M54.2 CHRONIC NECK PAIN: ICD-10-CM

## 2019-12-23 DIAGNOSIS — M25.552 BILATERAL HIP PAIN: ICD-10-CM

## 2019-12-23 PROCEDURE — 97140 MANUAL THERAPY 1/> REGIONS: CPT

## 2019-12-23 PROCEDURE — 97110 THERAPEUTIC EXERCISES: CPT

## 2019-12-23 NOTE — PROGRESS NOTES
Dx:        -Right hip pain    -Chronic pain of right knee   -Chronic neck pain   -Chronic upper back pain     Authorized # of Visits:  No prior authorization is required per appointment notes     Next MD visit: none scheduled  Fall Risk: standard complaint  -At least 50% reduction of pain with back and neck during the course of the day  -Demonstrate understanding of home program and progression  -Able to resume her prior regular exercise routine without issue    Plan: Review supine abdominal tasks cervical and OA regions    -Lateral walk with resistance band @ knees: 25 feet x 2 -Neck flexor endurance training                    Charges:    Therapeutic excercise x 2  Manual Therapy x 1         Total Timed Treatment: 45 min    Total Treatment Time: 45 factors included stretching; has been present for years; generally has been more present after the last year; denies saddle paraesthesia;   -Neck pain C/TH junction; stiffness in the back of neck; sharp pain with return her neck from flexed position; impac OBJECTIVE:   Observation/gait/posture:   -Forward head posture; upper thoracic flexion with upper cervical extension; shoulder girdle protraction; anterior inclination of the pelvis with increased lower lumbar lordosis  -Gait is with mild reduc performance; we outlined anticipate management from the perspective of physical therapist; questions were answered; follow-up visits were scheduled.     ASSESSMENT:   -51 year female with medical history of metabolic syndrome and prediabetes per patient and presentation    Precautions:  None      PLAN OF CARE:    Goals:    (12 visits)  -Demonstrate active movements of cervical region without complaint of pain/stiffness such as with rotation and return from flexed position such as with reading or attending to

## 2019-12-26 ENCOUNTER — OFFICE VISIT (OUTPATIENT)
Dept: PHYSICAL THERAPY | Age: 49
End: 2019-12-26
Attending: INTERNAL MEDICINE
Payer: COMMERCIAL

## 2019-12-26 DIAGNOSIS — G89.29 CHRONIC NECK PAIN: ICD-10-CM

## 2019-12-26 DIAGNOSIS — M25.551 BILATERAL HIP PAIN: ICD-10-CM

## 2019-12-26 DIAGNOSIS — M54.2 CHRONIC NECK PAIN: ICD-10-CM

## 2019-12-26 DIAGNOSIS — G89.29 CHRONIC PAIN OF RIGHT KNEE: ICD-10-CM

## 2019-12-26 DIAGNOSIS — M25.561 CHRONIC PAIN OF RIGHT KNEE: ICD-10-CM

## 2019-12-26 DIAGNOSIS — M25.552 BILATERAL HIP PAIN: ICD-10-CM

## 2019-12-26 PROCEDURE — 97110 THERAPEUTIC EXERCISES: CPT

## 2019-12-26 NOTE — PROGRESS NOTES
Dx:        -Right hip pain    -Chronic pain of right knee   -Chronic neck pain   -Chronic upper back pain     Authorized # of Visits:  No prior authorization is required per appointment notes     Next MD visit: none scheduled  Fall Risk: standard leg thrust:   -Stationary bike @ 1.0 level resistance:  65-75 rpm (without complaint of knee pain)  -Leg press - shuttle:  Double leg press:  8.0 level resistance:  20 reps x 3 sets  -Supine ADIM with leg slide 2 min each LE  -Supine ADIM with bent knee fa Marcy  Diagnosis:    -Right hip pain    -Chronic pain of right knee   -Chronic neck pain   -Chronic upper back pain    Date of Service: 12/10/2019     PATIENT Kimmie Perez is a 47year old female; she is referred by Dr. Bryan Pedroza her primary c vestibular disorder; describes what sounds like peripheral vestibular hypofunction; describes caloric testing; was seen by physical therapists; denies current vertigo or balance issues.  -Knee:  Denies pain or limitation; concerned that knee clicks a times comparable hip pain is present within right stance with subtle right lateral trunk flexion  Functional assessment:    -Stairs is performed without hand rail; step over step fashion with comparable hip pain with ascent during stance; this decreased with con Right \"hip\" pain is located in trochanteric region; did not appear of lumbar referral based on active motion assessment and accessory motion assessment; would appear likely to be of intra-articular origin; suggestive of gluteal tendinopathy secondary imp during resistance testing  -Able to walk for periods of at least 1 hour without pain complaint  -Able to attending to prolonged standing tasks of at least 1 hour without pain complaint  -At least 50% reduction of pain with back and neck during the course o

## 2019-12-30 ENCOUNTER — OFFICE VISIT (OUTPATIENT)
Dept: PHYSICAL THERAPY | Age: 49
End: 2019-12-30
Attending: INTERNAL MEDICINE
Payer: COMMERCIAL

## 2019-12-30 DIAGNOSIS — M25.552 BILATERAL HIP PAIN: ICD-10-CM

## 2019-12-30 DIAGNOSIS — M54.2 CHRONIC NECK PAIN: ICD-10-CM

## 2019-12-30 DIAGNOSIS — G89.29 CHRONIC NECK PAIN: ICD-10-CM

## 2019-12-30 DIAGNOSIS — M25.561 CHRONIC PAIN OF RIGHT KNEE: ICD-10-CM

## 2019-12-30 DIAGNOSIS — G89.29 CHRONIC PAIN OF RIGHT KNEE: ICD-10-CM

## 2019-12-30 DIAGNOSIS — M25.551 BILATERAL HIP PAIN: ICD-10-CM

## 2019-12-30 PROCEDURE — 97110 THERAPEUTIC EXERCISES: CPT

## 2019-12-30 NOTE — PROGRESS NOTES
Discharge Summary    Pt has attended 6 visits in Physical Therapy.       Dx:        -Right hip pain    -Chronic pain of right knee   -Chronic neck pain   -Chronic upper back pain     Authorized # of Visits:  No prior authorization is required per appointme home program and progression  -Able to resume her prior regular exercise routine without issue    Plan: Patient was provided with home program progression; patient to work with home program; discharge at this time.   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; she is is having mu include:  -Walking distances greater then 5-10 minutes impacting daily activity with tasks about home, community, work  -Prolonged standing such as encountered with her work  -Attending to reading, positions of neck flexion, comfort at night; sometimes tur time  AROM/overpressure:   -Active trunk flexion, rotation, lateral flexion, extension is without comparable right hip complaint; Trunk extension is without comparable lower back pain; Quadrant is remarkable for hip pain, however, familiar lower back ache classification secondary to SLR > 90, (+) PIT; neck pain to be assessed but likely of mobility classification with associated impairments in posture and muscle performance; does present with past medication history of vestibular disturbance; complaints lf issue    Frequency / Duration:   -2 times per week - six weeks - 12 visits  -Impairment based exercise progression with emphasis on hip abductors   -Motor control and trunk (back extensor/abdominal focuses) strengthening exercise  -Postural based activitie

## 2020-02-17 ENCOUNTER — OFFICE VISIT (OUTPATIENT)
Dept: INTERNAL MEDICINE CLINIC | Facility: CLINIC | Age: 50
End: 2020-02-17
Payer: COMMERCIAL

## 2020-02-17 VITALS
BODY MASS INDEX: 29.19 KG/M2 | RESPIRATION RATE: 16 BRPM | DIASTOLIC BLOOD PRESSURE: 70 MMHG | SYSTOLIC BLOOD PRESSURE: 118 MMHG | HEART RATE: 86 BPM | HEIGHT: 67 IN | WEIGHT: 186 LBS

## 2020-02-17 DIAGNOSIS — O16.1 ELEVATED BLOOD PRESSURE AFFECTING PREGNANCY IN FIRST TRIMESTER, ANTEPARTUM: ICD-10-CM

## 2020-02-17 DIAGNOSIS — E66.9 OBESITY (BMI 30.0-34.9): ICD-10-CM

## 2020-02-17 DIAGNOSIS — E11.8 TYPE 2 DIABETES MELLITUS WITH COMPLICATION, WITHOUT LONG-TERM CURRENT USE OF INSULIN (HCC): ICD-10-CM

## 2020-02-17 DIAGNOSIS — Z51.81 THERAPEUTIC DRUG MONITORING: Primary | ICD-10-CM

## 2020-02-17 PROCEDURE — 99214 OFFICE O/P EST MOD 30 MIN: CPT | Performed by: INTERNAL MEDICINE

## 2020-02-17 RX ORDER — SEMAGLUTIDE 1.34 MG/ML
0.5 INJECTION, SOLUTION SUBCUTANEOUS WEEKLY
Qty: 3 PEN | Refills: 1 | Status: SHIPPED | OUTPATIENT
Start: 2020-02-17 | End: 2020-10-08

## 2020-02-17 RX ORDER — SEMAGLUTIDE 1.34 MG/ML
0.5 INJECTION, SOLUTION SUBCUTANEOUS WEEKLY
Qty: 1 PEN | Refills: 1 | Status: SHIPPED | OUTPATIENT
Start: 2020-02-17 | End: 2020-02-17

## 2020-02-17 NOTE — PROGRESS NOTES
HISTORY OF PRESENT ILLNESS  Patient presents with:  Weight Check: Down 14 lbs      Dorian Porter is a 47year old female here for follow up in medical weight loss program.     Denies chest pain, shortness of breath, dizziness, blurred vision, headache, .0 12/12/2019     Lab Results   Component Value Date     (H) 12/12/2019    BUN 12 12/12/2019    BUNCREA 13.8 12/12/2019    CREATSERUM 0.87 12/12/2019    ANIONGAP 8 12/12/2019    GFRNAA 78 12/12/2019    GFRAA 90 12/12/2019    CA 9.7 12/12/2 orders for this visit:    Therapeutic drug monitoring    Obesity (BMI 30.0-34. 9)    Type 2 diabetes mellitus with complication, without long-term current use of insulin (HCC)    Elevated blood pressure affecting pregnancy in first trimester, antepartum

## 2020-03-24 ENCOUNTER — HOSPITAL ENCOUNTER (OUTPATIENT)
Age: 50
Discharge: HOME | End: 2020-03-24
Attending: FAMILY MEDICINE
Payer: COMMERCIAL

## 2020-03-24 VITALS
TEMPERATURE: 98 F | RESPIRATION RATE: 16 BRPM | WEIGHT: 180 LBS | BODY MASS INDEX: 28 KG/M2 | HEART RATE: 85 BPM | SYSTOLIC BLOOD PRESSURE: 117 MMHG | OXYGEN SATURATION: 98 % | DIASTOLIC BLOOD PRESSURE: 86 MMHG

## 2020-03-24 DIAGNOSIS — N30.00 ACUTE CYSTITIS WITHOUT HEMATURIA: Primary | ICD-10-CM

## 2020-03-24 LAB
POCT BILIRUBIN URINE: NEGATIVE
POCT GLUCOSE URINE: NEGATIVE MG/DL
POCT KETONE URINE: NEGATIVE MG/DL
POCT NITRITE URINE: POSITIVE
POCT PH URINE: 5.5 (ref 5–8)
POCT SPECIFIC GRAVITY URINE: 1.03
POCT URINE COLOR: YELLOW
POCT UROBILINOGEN URINE: 0.2 MG/DL

## 2020-03-24 PROCEDURE — 87186 SC STD MICRODIL/AGAR DIL: CPT | Performed by: FAMILY MEDICINE

## 2020-03-24 PROCEDURE — 87077 CULTURE AEROBIC IDENTIFY: CPT | Performed by: FAMILY MEDICINE

## 2020-03-24 PROCEDURE — 99214 OFFICE O/P EST MOD 30 MIN: CPT

## 2020-03-24 PROCEDURE — 87086 URINE CULTURE/COLONY COUNT: CPT | Performed by: FAMILY MEDICINE

## 2020-03-24 PROCEDURE — 81002 URINALYSIS NONAUTO W/O SCOPE: CPT | Performed by: FAMILY MEDICINE

## 2020-03-24 RX ORDER — NITROFURANTOIN 25; 75 MG/1; MG/1
100 CAPSULE ORAL 2 TIMES DAILY
Qty: 14 CAPSULE | Refills: 0 | Status: SHIPPED | OUTPATIENT
Start: 2020-03-24 | End: 2020-03-31

## 2020-03-24 RX ORDER — PHENAZOPYRIDINE HYDROCHLORIDE 200 MG/1
200 TABLET, FILM COATED ORAL 3 TIMES DAILY PRN
Qty: 6 TABLET | Refills: 0 | Status: SHIPPED | OUTPATIENT
Start: 2020-03-24 | End: 2020-03-26

## 2020-03-24 NOTE — ED PROVIDER NOTES
Patient presents with:  UTI    HPI:     Godwin Arreola is a 47year old female who presents with suspected symptoms of UTI  Onset of symptoms: 1  day  Complains of:  - Dysuria: yes   - Frequency and  urgency of urination: yes   - Hesitancy: yes   - Sensa ROM normal. No CVA tenderness. . +suprapubic tenderness  Lungs: clear to auscultation bilaterally  Heart: S1, S2 normal, no murmur, click, rub or gallop, regular rate and rhythm  Abdomen: soft, non-tender; bowel sounds normal; no masses,  no organomegaly  P Phenazopyridine HCl 200 MG Oral Tab         Plan:  Rx: Macrobid and Pyridium   Urine culture ordered   Push fluids  Cranberry juice  Monitor for amount of bleeding in urine  Monitor urine output and fluid intake  Present to ED for any worsening abdominal p

## 2020-03-24 NOTE — ED INITIAL ASSESSMENT (HPI)
Pt states since last night some frequency and urgency , today pain with urination. Hx of UTI one year ago and states the Bactrim worked well.

## 2020-04-04 ENCOUNTER — TELEPHONE (OUTPATIENT)
Dept: INTERNAL MEDICINE CLINIC | Facility: CLINIC | Age: 50
End: 2020-04-04

## 2020-04-04 DIAGNOSIS — N30.00 ACUTE CYSTITIS WITHOUT HEMATURIA: Primary | ICD-10-CM

## 2020-04-04 RX ORDER — PHENAZOPYRIDINE HYDROCHLORIDE 200 MG/1
200 TABLET, FILM COATED ORAL 3 TIMES DAILY PRN
Qty: 10 TABLET | Refills: 0 | Status: SHIPPED | OUTPATIENT
Start: 2020-04-04 | End: 2020-04-07

## 2020-04-04 RX ORDER — CIPROFLOXACIN 500 MG/1
500 TABLET, FILM COATED ORAL 2 TIMES DAILY
Qty: 10 TABLET | Refills: 0 | Status: SHIPPED | OUTPATIENT
Start: 2020-04-04 | End: 2020-04-09

## 2020-04-04 NOTE — TELEPHONE ENCOUNTER
Having similar symptoms when she was seen in IC-burning and some urgency  No gi cc  Med sent  Rpt urine after frx  Orders placed

## 2020-05-14 PROBLEM — Z79.890 HORMONE REPLACEMENT THERAPY (HRT): Status: ACTIVE | Noted: 2020-05-14

## 2020-08-31 ENCOUNTER — LAB ENCOUNTER (OUTPATIENT)
Dept: LAB | Age: 50
End: 2020-08-31
Attending: OTOLARYNGOLOGY
Payer: COMMERCIAL

## 2020-08-31 DIAGNOSIS — K76.0 FATTY LIVER: ICD-10-CM

## 2020-08-31 DIAGNOSIS — Z51.81 THERAPEUTIC DRUG MONITORING: ICD-10-CM

## 2020-08-31 DIAGNOSIS — N30.00 ACUTE CYSTITIS WITHOUT HEMATURIA: ICD-10-CM

## 2020-08-31 DIAGNOSIS — G89.29 HIP PAIN, CHRONIC, RIGHT: ICD-10-CM

## 2020-08-31 DIAGNOSIS — K76.0 NAFLD (NONALCOHOLIC FATTY LIVER DISEASE): ICD-10-CM

## 2020-08-31 DIAGNOSIS — E07.9 THYROID DYSFUNCTION: ICD-10-CM

## 2020-08-31 DIAGNOSIS — E66.9 OBESITY (BMI 30.0-34.9): ICD-10-CM

## 2020-08-31 DIAGNOSIS — M25.551 HIP PAIN, CHRONIC, RIGHT: ICD-10-CM

## 2020-08-31 DIAGNOSIS — E11.8 TYPE 2 DIABETES MELLITUS WITH COMPLICATION, WITHOUT LONG-TERM CURRENT USE OF INSULIN (HCC): ICD-10-CM

## 2020-08-31 LAB
ALBUMIN SERPL-MCNC: 3.6 G/DL (ref 3.4–5)
ALBUMIN/GLOB SERPL: 1 {RATIO} (ref 1–2)
ALP LIVER SERPL-CCNC: 84 U/L (ref 41–108)
ALT SERPL-CCNC: 20 U/L (ref 13–56)
ANION GAP SERPL CALC-SCNC: 8 MMOL/L (ref 0–18)
AST SERPL-CCNC: 6 U/L (ref 15–37)
BASOPHILS # BLD AUTO: 0.12 X10(3) UL (ref 0–0.2)
BASOPHILS NFR BLD AUTO: 1 %
BILIRUB SERPL-MCNC: 0.4 MG/DL (ref 0.1–2)
BILIRUB UR QL STRIP.AUTO: NEGATIVE
BUN BLD-MCNC: 16 MG/DL (ref 7–18)
BUN/CREAT SERPL: 20.3 (ref 10–20)
CALCIUM BLD-MCNC: 9.5 MG/DL (ref 8.5–10.1)
CHLORIDE SERPL-SCNC: 106 MMOL/L (ref 98–112)
CHOLEST SMN-MCNC: 259 MG/DL (ref ?–200)
CO2 SERPL-SCNC: 26 MMOL/L (ref 21–32)
COLOR UR AUTO: YELLOW
CREAT BLD-MCNC: 0.79 MG/DL (ref 0.55–1.02)
DEPRECATED RDW RBC AUTO: 46.5 FL (ref 35.1–46.3)
EOSINOPHIL # BLD AUTO: 0.18 X10(3) UL (ref 0–0.7)
EOSINOPHIL NFR BLD AUTO: 1.5 %
ERYTHROCYTE [DISTWIDTH] IN BLOOD BY AUTOMATED COUNT: 13.4 % (ref 11–15)
EST. AVERAGE GLUCOSE BLD GHB EST-MCNC: 126 MG/DL (ref 68–126)
GLOBULIN PLAS-MCNC: 3.5 G/DL (ref 2.8–4.4)
GLUCOSE BLD-MCNC: 125 MG/DL (ref 70–99)
GLUCOSE UR STRIP.AUTO-MCNC: NEGATIVE MG/DL
HBA1C MFR BLD HPLC: 6 % (ref ?–5.7)
HCT VFR BLD AUTO: 42.2 % (ref 35–48)
HDLC SERPL-MCNC: 56 MG/DL (ref 40–59)
HGB BLD-MCNC: 13.3 G/DL (ref 12–16)
IMM GRANULOCYTES # BLD AUTO: 0.03 X10(3) UL (ref 0–1)
IMM GRANULOCYTES NFR BLD: 0.2 %
KETONES UR STRIP.AUTO-MCNC: NEGATIVE MG/DL
LDLC SERPL CALC-MCNC: 176 MG/DL (ref ?–100)
LEUKOCYTE ESTERASE UR QL STRIP.AUTO: NEGATIVE
LYMPHOCYTES # BLD AUTO: 4.75 X10(3) UL (ref 1–4)
LYMPHOCYTES NFR BLD AUTO: 38.5 %
M PROTEIN MFR SERPL ELPH: 7.1 G/DL (ref 6.4–8.2)
MCH RBC QN AUTO: 29.8 PG (ref 26–34)
MCHC RBC AUTO-ENTMCNC: 31.5 G/DL (ref 31–37)
MCV RBC AUTO: 94.6 FL (ref 80–100)
MONOCYTES # BLD AUTO: 0.75 X10(3) UL (ref 0.1–1)
MONOCYTES NFR BLD AUTO: 6.1 %
NEUTROPHILS # BLD AUTO: 6.52 X10 (3) UL (ref 1.5–7.7)
NEUTROPHILS # BLD AUTO: 6.52 X10(3) UL (ref 1.5–7.7)
NEUTROPHILS NFR BLD AUTO: 52.7 %
NITRITE UR QL STRIP.AUTO: NEGATIVE
NONHDLC SERPL-MCNC: 203 MG/DL (ref ?–130)
OSMOLALITY SERPL CALC.SUM OF ELEC: 293 MOSM/KG (ref 275–295)
PATIENT FASTING Y/N/NP: YES
PATIENT FASTING Y/N/NP: YES
PH UR STRIP.AUTO: 5 [PH] (ref 4.5–8)
PLATELET # BLD AUTO: 338 10(3)UL (ref 150–450)
POTASSIUM SERPL-SCNC: 3.7 MMOL/L (ref 3.5–5.1)
PROT UR STRIP.AUTO-MCNC: NEGATIVE MG/DL
RBC # BLD AUTO: 4.46 X10(6)UL (ref 3.8–5.3)
RBC UR QL AUTO: NEGATIVE
SODIUM SERPL-SCNC: 140 MMOL/L (ref 136–145)
SP GR UR STRIP.AUTO: 1.02 (ref 1–1.03)
T4 FREE SERPL-MCNC: 0.9 NG/DL (ref 0.8–1.7)
TRIGL SERPL-MCNC: 133 MG/DL (ref 30–149)
TSI SER-ACNC: 1.78 MIU/ML (ref 0.36–3.74)
UROBILINOGEN UR STRIP.AUTO-MCNC: <2 MG/DL
VIT B12 SERPL-MCNC: 669 PG/ML (ref 193–986)
VIT D+METAB SERPL-MCNC: 51.5 NG/ML (ref 30–100)
VLDLC SERPL CALC-MCNC: 27 MG/DL (ref 0–30)
WBC # BLD AUTO: 12.4 X10(3) UL (ref 4–11)

## 2020-08-31 PROCEDURE — 81003 URINALYSIS AUTO W/O SCOPE: CPT | Performed by: INTERNAL MEDICINE

## 2020-08-31 PROCEDURE — 82306 VITAMIN D 25 HYDROXY: CPT | Performed by: INTERNAL MEDICINE

## 2020-08-31 PROCEDURE — 87086 URINE CULTURE/COLONY COUNT: CPT | Performed by: INTERNAL MEDICINE

## 2020-08-31 PROCEDURE — 82607 VITAMIN B-12: CPT | Performed by: INTERNAL MEDICINE

## 2020-09-02 LAB — F-ACTIN (SMOOTH MUSCLE) AB: 13 UNITS

## 2020-09-02 NOTE — PROGRESS NOTES
Called and left VM with the labs and interpretation. Liver enzymes now in normal ranges for a female (ALT ~20s) and there is no evidence of any liver inflammation at this time. She needs to continue with weight loss through diet and exercise.   No specifi

## 2020-09-09 NOTE — PROGRESS NOTES
Pt notified.    appt made     Future Appointments  9/17/2020  1:50 PM    Mark Arvizu MD       [facility] E[facility]         [facility]

## 2020-09-28 ENCOUNTER — HOSPITAL ENCOUNTER (OUTPATIENT)
Dept: ULTRASOUND IMAGING | Age: 50
Discharge: HOME | End: 2020-09-28
Attending: OTOLARYNGOLOGY
Payer: COMMERCIAL

## 2020-09-28 DIAGNOSIS — E04.1 NONTOXIC THYROID NODULE: ICD-10-CM

## 2020-09-28 PROCEDURE — 76536 US EXAM OF HEAD AND NECK: CPT | Performed by: OTOLARYNGOLOGY

## 2020-10-08 ENCOUNTER — OFFICE VISIT (OUTPATIENT)
Dept: INTERNAL MEDICINE CLINIC | Facility: CLINIC | Age: 50
End: 2020-10-08
Payer: COMMERCIAL

## 2020-10-08 VITALS
HEART RATE: 70 BPM | WEIGHT: 199 LBS | RESPIRATION RATE: 16 BRPM | HEIGHT: 67 IN | BODY MASS INDEX: 31.23 KG/M2 | SYSTOLIC BLOOD PRESSURE: 120 MMHG | DIASTOLIC BLOOD PRESSURE: 78 MMHG

## 2020-10-08 DIAGNOSIS — K76.0 FATTY LIVER: ICD-10-CM

## 2020-10-08 DIAGNOSIS — E66.1 CLASS 1 DRUG-INDUCED OBESITY WITHOUT SERIOUS COMORBIDITY WITH BODY MASS INDEX (BMI) OF 30.0 TO 30.9 IN ADULT: ICD-10-CM

## 2020-10-08 DIAGNOSIS — Z51.81 THERAPEUTIC DRUG MONITORING: ICD-10-CM

## 2020-10-08 DIAGNOSIS — E11.8 TYPE 2 DIABETES MELLITUS WITH COMPLICATION, WITHOUT LONG-TERM CURRENT USE OF INSULIN (HCC): Primary | ICD-10-CM

## 2020-10-08 PROCEDURE — 3078F DIAST BP <80 MM HG: CPT | Performed by: INTERNAL MEDICINE

## 2020-10-08 PROCEDURE — 3008F BODY MASS INDEX DOCD: CPT | Performed by: INTERNAL MEDICINE

## 2020-10-08 PROCEDURE — 3074F SYST BP LT 130 MM HG: CPT | Performed by: INTERNAL MEDICINE

## 2020-10-08 PROCEDURE — 99214 OFFICE O/P EST MOD 30 MIN: CPT | Performed by: INTERNAL MEDICINE

## 2020-10-08 RX ORDER — SEMAGLUTIDE 1.34 MG/ML
1 INJECTION, SOLUTION SUBCUTANEOUS WEEKLY
Qty: 2 PEN | Refills: 1 | Status: SHIPPED | OUTPATIENT
Start: 2020-10-08 | End: 2020-12-01

## 2020-10-08 RX ORDER — SEMAGLUTIDE 1.34 MG/ML
0.5 INJECTION, SOLUTION SUBCUTANEOUS WEEKLY
Qty: 3 PEN | Refills: 1 | Status: CANCELLED | OUTPATIENT
Start: 2020-10-08

## 2020-10-08 NOTE — PROGRESS NOTES
HISTORY OF PRESENT ILLNESS  Patient presents with:  Weight Problem: up 13 lb      Prasanna Chopra is a 45year old female here for follow up in medical weight loss program.     Denies chest pain, shortness of breath, dizziness, blurred vision, headache, p Component Value Date     (H) 08/31/2020    BUN 16 08/31/2020    BUNCREA 20.3 (H) 08/31/2020    CREATSERUM 0.79 08/31/2020    ANIONGAP 8 08/31/2020    GFRNAA 87 08/31/2020    GFRAA 100 08/31/2020    CA 9.5 08/31/2020    OSMOCALC 293 08/31/2020    A without long-term current use of insulin (HCC)    Therapeutic drug monitoring    Fatty liver    Class 1 drug-induced obesity without serious comorbidity with body mass index (BMI) of 30.0 to 30.9 in adult    Other orders  -     Cancel: OZEMPIC, 0.25 OR 0.5

## 2020-11-18 ENCOUNTER — OFFICE VISIT (OUTPATIENT)
Dept: INTERNAL MEDICINE CLINIC | Facility: CLINIC | Age: 50
End: 2020-11-18
Payer: COMMERCIAL

## 2020-11-18 VITALS
RESPIRATION RATE: 16 BRPM | HEIGHT: 67 IN | DIASTOLIC BLOOD PRESSURE: 64 MMHG | HEART RATE: 68 BPM | TEMPERATURE: 98 F | OXYGEN SATURATION: 98 % | BODY MASS INDEX: 30.92 KG/M2 | WEIGHT: 197 LBS | SYSTOLIC BLOOD PRESSURE: 112 MMHG

## 2020-11-18 DIAGNOSIS — M54.9 MID BACK PAIN: Primary | ICD-10-CM

## 2020-11-18 PROCEDURE — 99213 OFFICE O/P EST LOW 20 MIN: CPT | Performed by: INTERNAL MEDICINE

## 2020-11-18 PROCEDURE — 99072 ADDL SUPL MATRL&STAF TM PHE: CPT | Performed by: INTERNAL MEDICINE

## 2020-11-18 PROCEDURE — 3008F BODY MASS INDEX DOCD: CPT | Performed by: INTERNAL MEDICINE

## 2020-11-18 PROCEDURE — 3074F SYST BP LT 130 MM HG: CPT | Performed by: INTERNAL MEDICINE

## 2020-11-18 PROCEDURE — 3078F DIAST BP <80 MM HG: CPT | Performed by: INTERNAL MEDICINE

## 2020-11-18 NOTE — PROGRESS NOTES
Patient Office Visit    ASSESSMENT AND PLAN:   (M54.9) Mid back pain  (primary encounter diagnosis)  Plan: No pain currently. Recommended to try tylenol to see if that helps with symptoms. Recommended against opioids such as dilaudid.  Start a [place] and symptoms about 2 years ago and was      Given dilaudid for this   - feels that her symptoms have improved now   - feels dehydrated   - no history of kidney stones    - no lower extremity weakness or saddle anesthesia     Past Medical History:   Diagnosis D Allergies:    Wasps                   SWELLING  Coffee Bean Extract*    OTHER (SEE COMMENTS)    Comment:Per allergy testing  Corn                    UNKNOWN    Comment:Swelling, inflammation, SOB  Erythromycin            OTHER (SEE COMMENTS)    Comment (Approximate)   SpO2 98%   BMI 30.85 kg/m²     PHYSICAL EXAM:   Constitutional: Vital signs reviewed as noted, well developed, in no acute distress.    HENT: NCAT  Eyes: pupils reactive bilaterally  Cardiovascular: nl s1 s2   Pulmonary/Chest: CTA bilaterall

## 2020-12-01 RX ORDER — SEMAGLUTIDE 1.34 MG/ML
INJECTION, SOLUTION SUBCUTANEOUS
Qty: 3 ML | Refills: 0 | Status: SHIPPED | OUTPATIENT
Start: 2020-12-01 | End: 2021-02-10

## 2020-12-01 NOTE — TELEPHONE ENCOUNTER
Requesting  Requested Prescriptions     Pending Prescriptions Disp Refills   • OZEMPIC, 1 MG/DOSE, 2 MG/1.5ML Subcutaneous Solution Pen-injector [Pharmacy Med Name: Ozempic (1 MG/DOSE) Subcutaneous Solution Pen-injector 2 MG/1.5ML] 3 mL 0     Sig: INJECT 1

## 2020-12-10 ENCOUNTER — OFFICE VISIT (OUTPATIENT)
Dept: INTERNAL MEDICINE CLINIC | Facility: CLINIC | Age: 50
End: 2020-12-10
Payer: COMMERCIAL

## 2020-12-10 DIAGNOSIS — E11.8 TYPE 2 DIABETES MELLITUS WITH COMPLICATION, WITHOUT LONG-TERM CURRENT USE OF INSULIN (HCC): ICD-10-CM

## 2020-12-10 DIAGNOSIS — K76.0 FATTY LIVER: ICD-10-CM

## 2020-12-10 DIAGNOSIS — E66.9 OBESITY (BMI 30.0-34.9): ICD-10-CM

## 2020-12-10 DIAGNOSIS — R14.0 ABDOMINAL BLOATING: ICD-10-CM

## 2020-12-10 DIAGNOSIS — Z51.81 THERAPEUTIC DRUG MONITORING: Primary | ICD-10-CM

## 2020-12-10 PROCEDURE — 99214 OFFICE O/P EST MOD 30 MIN: CPT | Performed by: INTERNAL MEDICINE

## 2020-12-10 RX ORDER — SEMAGLUTIDE 1.34 MG/ML
1 INJECTION, SOLUTION SUBCUTANEOUS WEEKLY
Qty: 6 PEN | Refills: 1 | Status: SHIPPED | OUTPATIENT
Start: 2020-12-10 | End: 2021-02-10

## 2020-12-10 NOTE — PROGRESS NOTES
HISTORY OF PRESENT ILLNESS  Patient presents with:  Weight Check: video visit       Christi Mason is a 45year old female here for follow up in medical weight loss program.     Denies chest pain, shortness of breath, dizziness, blurred vision, headache, BUNCREA 20.3 (H) 08/31/2020    CREATSERUM 0.79 08/31/2020    ANIONGAP 8 08/31/2020    GFRNAA 87 08/31/2020    GFRAA 100 08/31/2020    CA 9.5 08/31/2020    OSMOCALC 293 08/31/2020    ALKPHO 84 08/31/2020    AST 6 (L) 08/31/2020    ALT 20 08/31/2020    BILT 12/18/19 200 lb   · Reconsult: 10/8/20 at 199 lb   · Increase strength training   · Continue ozempic increase to 1.0 mg q weekly  · -advised of side effects and adverse effects of this medication  · Reviewed most recen ta1c  · Continue low carb eating   ·

## 2020-12-16 ENCOUNTER — LAB ENCOUNTER (OUTPATIENT)
Dept: LAB | Age: 50
End: 2020-12-16
Attending: PHYSICIAN ASSISTANT
Payer: COMMERCIAL

## 2020-12-16 DIAGNOSIS — E66.9 OBESITY (BMI 30.0-34.9): ICD-10-CM

## 2020-12-16 DIAGNOSIS — K76.0 FATTY LIVER: ICD-10-CM

## 2020-12-16 DIAGNOSIS — R14.0 ABDOMINAL BLOATING: ICD-10-CM

## 2020-12-16 DIAGNOSIS — E11.8 TYPE 2 DIABETES MELLITUS WITH COMPLICATION, WITHOUT LONG-TERM CURRENT USE OF INSULIN (HCC): ICD-10-CM

## 2020-12-16 DIAGNOSIS — Z51.81 THERAPEUTIC DRUG MONITORING: ICD-10-CM

## 2020-12-16 PROCEDURE — 82784 ASSAY IGA/IGD/IGG/IGM EACH: CPT

## 2020-12-16 PROCEDURE — 83516 IMMUNOASSAY NONANTIBODY: CPT

## 2020-12-16 PROCEDURE — 36415 COLL VENOUS BLD VENIPUNCTURE: CPT

## 2020-12-17 PROBLEM — Z80.41 FAMILY HISTORY OF OVARIAN CANCER: Status: ACTIVE | Noted: 2020-12-17

## 2020-12-17 PROBLEM — E78.2 MIXED HYPERLIPIDEMIA: Status: ACTIVE | Noted: 2020-12-17

## 2020-12-21 ENCOUNTER — TELEPHONE (OUTPATIENT)
Dept: INTERNAL MEDICINE CLINIC | Facility: CLINIC | Age: 50
End: 2020-12-21

## 2020-12-21 NOTE — TELEPHONE ENCOUNTER
Called and spoke with pt, she is still sick on the Ozempic pt did not take the medication last week. She has stopped because she is not able to handle the nausea/vomitting.   Please advise

## 2020-12-21 NOTE — TELEPHONE ENCOUNTER
Pt called left vmail states she has stopped taking rx due to it making her violently ill , would like to discuss changing rx

## 2021-02-10 ENCOUNTER — OFFICE VISIT (OUTPATIENT)
Dept: INTERNAL MEDICINE CLINIC | Facility: CLINIC | Age: 51
End: 2021-02-10
Payer: COMMERCIAL

## 2021-02-10 VITALS
WEIGHT: 204 LBS | HEART RATE: 84 BPM | DIASTOLIC BLOOD PRESSURE: 80 MMHG | RESPIRATION RATE: 16 BRPM | BODY MASS INDEX: 32.02 KG/M2 | HEIGHT: 67 IN | SYSTOLIC BLOOD PRESSURE: 122 MMHG

## 2021-02-10 DIAGNOSIS — K76.0 FATTY LIVER: ICD-10-CM

## 2021-02-10 DIAGNOSIS — E66.9 OBESITY (BMI 30-39.9): ICD-10-CM

## 2021-02-10 DIAGNOSIS — E11.8 TYPE 2 DIABETES MELLITUS WITH COMPLICATION, WITHOUT LONG-TERM CURRENT USE OF INSULIN (HCC): ICD-10-CM

## 2021-02-10 DIAGNOSIS — R53.82 CHRONIC FATIGUE: ICD-10-CM

## 2021-02-10 DIAGNOSIS — Z51.81 THERAPEUTIC DRUG MONITORING: Primary | ICD-10-CM

## 2021-02-10 PROCEDURE — 3008F BODY MASS INDEX DOCD: CPT | Performed by: INTERNAL MEDICINE

## 2021-02-10 PROCEDURE — 3079F DIAST BP 80-89 MM HG: CPT | Performed by: INTERNAL MEDICINE

## 2021-02-10 PROCEDURE — 93000 ELECTROCARDIOGRAM COMPLETE: CPT | Performed by: INTERNAL MEDICINE

## 2021-02-10 PROCEDURE — 99214 OFFICE O/P EST MOD 30 MIN: CPT | Performed by: INTERNAL MEDICINE

## 2021-02-10 PROCEDURE — 3074F SYST BP LT 130 MM HG: CPT | Performed by: INTERNAL MEDICINE

## 2021-02-10 RX ORDER — PHENTERMINE HYDROCHLORIDE 15 MG/1
15 CAPSULE ORAL EVERY MORNING
Qty: 30 CAPSULE | Refills: 1 | Status: SHIPPED | OUTPATIENT
Start: 2021-02-10 | End: 2021-04-08

## 2021-02-10 RX ORDER — PHENTERMINE HYDROCHLORIDE 15 MG/1
15 CAPSULE ORAL EVERY MORNING
Qty: 30 CAPSULE | Refills: 0 | Status: SHIPPED | OUTPATIENT
Start: 2021-02-10 | End: 2021-02-10

## 2021-02-10 NOTE — PROGRESS NOTES
HISTORY OF PRESENT ILLNESS  Patient presents with:  Weight Check: up 5 lb      Severo Argyle is a 45year old female here for follow up in medical weight loss program.     Denies chest pain, shortness of breath, dizziness, blurred vision, headache, pare 08/31/2020    GFRAA 100 08/31/2020    CA 9.5 08/31/2020    OSMOCALC 293 08/31/2020    ALKPHO 84 08/31/2020    AST 6 (L) 08/31/2020    ALT 20 08/31/2020    BILT 0.4 08/31/2020    TP 7.1 08/31/2020    ALB 3.6 08/31/2020    GLOBULIN 3.5 08/31/2020     0 30-39. 9)  -     ELECTROCARDIOGRAM, COMPLETE    Chronic fatigue    Other orders  -     Discontinue: Phentermine HCl 15 MG Oral Cap; Take 1 capsule (15 mg total) by mouth every morning.  -     Phentermine HCl 15 MG Oral Cap;  Take 1 capsule (15 mg total) by m

## 2021-03-05 ENCOUNTER — HOSPITAL ENCOUNTER (OUTPATIENT)
Age: 51
Discharge: HOME | End: 2021-03-05
Payer: COMMERCIAL

## 2021-03-05 VITALS
HEART RATE: 89 BPM | RESPIRATION RATE: 16 BRPM | WEIGHT: 190 LBS | DIASTOLIC BLOOD PRESSURE: 84 MMHG | BODY MASS INDEX: 30 KG/M2 | OXYGEN SATURATION: 98 % | SYSTOLIC BLOOD PRESSURE: 137 MMHG | TEMPERATURE: 98 F

## 2021-03-05 DIAGNOSIS — R35.0 URINARY FREQUENCY: Primary | ICD-10-CM

## 2021-03-05 DIAGNOSIS — N34.2 URETHRITIS: ICD-10-CM

## 2021-03-05 LAB
POCT BILIRUBIN URINE: NEGATIVE
POCT BLOOD URINE: NEGATIVE
POCT GLUCOSE URINE: NEGATIVE MG/DL
POCT KETONE URINE: NEGATIVE MG/DL
POCT LEUKOCYTE ESTERASE URINE: NEGATIVE
POCT NITRITE URINE: NEGATIVE
POCT PH URINE: 5.5 (ref 5–8)
POCT SPECIFIC GRAVITY URINE: 1.03
POCT URINE CLARITY: CLEAR
POCT URINE COLOR: YELLOW
POCT UROBILINOGEN URINE: 0.2 MG/DL

## 2021-03-05 PROCEDURE — 87086 URINE CULTURE/COLONY COUNT: CPT | Performed by: PHYSICIAN ASSISTANT

## 2021-03-05 PROCEDURE — 99213 OFFICE O/P EST LOW 20 MIN: CPT | Performed by: PHYSICIAN ASSISTANT

## 2021-03-05 PROCEDURE — 81002 URINALYSIS NONAUTO W/O SCOPE: CPT | Performed by: PHYSICIAN ASSISTANT

## 2021-03-05 RX ORDER — PHENAZOPYRIDINE HYDROCHLORIDE 100 MG/1
100 TABLET, FILM COATED ORAL 3 TIMES DAILY PRN
Qty: 6 TABLET | Refills: 0 | Status: SHIPPED | OUTPATIENT
Start: 2021-03-05 | End: 2021-03-12

## 2021-03-05 NOTE — ED PROVIDER NOTES
Patient Seen in: [facility]mediate [address] [place] [address]      History   Patient presents with:  Urinary Urgency  Cramping    Stated Complaint: uti    HPI/Subjective:   HPI    13-year-old female who comes in today complaining of urinary frequency and foul-smelling urine wi vital signs reviewed. All other systems reviewed and negative except as noted above.     Physical Exam     ED Triage Vitals   BP 03/05/21 1645 137/84   Pulse 03/05/21 1645 89   Resp 03/05/21 1643 16   Temp 03/05/21 1643 98.4 °F (36.9 °C)   Temp src -- discharge.  I discuss the plan of care with the patient, who expresses understanding.  All questions and concerns are addressed to the patient's satisfaction prior to discharge today.                      Disposition and Plan     Clinical Impression:  Avni Ward

## 2021-03-19 ENCOUNTER — TELEPHONE (OUTPATIENT)
Dept: INTERNAL MEDICINE CLINIC | Facility: CLINIC | Age: 51
End: 2021-03-19

## 2021-03-19 ENCOUNTER — OFFICE VISIT (OUTPATIENT)
Dept: INTERNAL MEDICINE CLINIC | Facility: CLINIC | Age: 51
End: 2021-03-19
Payer: COMMERCIAL

## 2021-03-19 VITALS
OXYGEN SATURATION: 98 % | SYSTOLIC BLOOD PRESSURE: 122 MMHG | TEMPERATURE: 98 F | RESPIRATION RATE: 16 BRPM | WEIGHT: 198 LBS | DIASTOLIC BLOOD PRESSURE: 88 MMHG | HEIGHT: 67 IN | HEART RATE: 102 BPM | BODY MASS INDEX: 31.08 KG/M2

## 2021-03-19 DIAGNOSIS — R09.89 PAINFUL VEINS: ICD-10-CM

## 2021-03-19 DIAGNOSIS — R39.15 URINARY URGENCY: Primary | ICD-10-CM

## 2021-03-19 DIAGNOSIS — R52 PAINFUL VEINS: ICD-10-CM

## 2021-03-19 DIAGNOSIS — N89.8 VAGINAL ODOR: ICD-10-CM

## 2021-03-19 LAB
GLUCOSE (URINE DIPSTICK): 100 MG/DL
KETONES (URINE DIPSTICK): NEGATIVE MG/DL
LEUKOCYTES: NEGATIVE
MULTISTIX LOT#: 1044 NUMERIC
NITRITE, URINE: NEGATIVE
OCCULT BLOOD: NEGATIVE
PH, URINE: 5.5 (ref 4.5–8)
PROTEIN (URINE DIPSTICK): 30 MG/DL
SPECIFIC GRAVITY: 1.03 (ref 1–1.03)
URINE-COLOR: YELLOW
UROBILINOGEN,SEMI-QN: 0.2 MG/DL (ref 0–1.9)

## 2021-03-19 PROCEDURE — 87510 GARDNER VAG DNA DIR PROBE: CPT | Performed by: INTERNAL MEDICINE

## 2021-03-19 PROCEDURE — 81002 URINALYSIS NONAUTO W/O SCOPE: CPT | Performed by: INTERNAL MEDICINE

## 2021-03-19 PROCEDURE — 3008F BODY MASS INDEX DOCD: CPT | Performed by: INTERNAL MEDICINE

## 2021-03-19 PROCEDURE — 3079F DIAST BP 80-89 MM HG: CPT | Performed by: INTERNAL MEDICINE

## 2021-03-19 PROCEDURE — 3074F SYST BP LT 130 MM HG: CPT | Performed by: INTERNAL MEDICINE

## 2021-03-19 PROCEDURE — 99213 OFFICE O/P EST LOW 20 MIN: CPT | Performed by: INTERNAL MEDICINE

## 2021-03-19 PROCEDURE — 87480 CANDIDA DNA DIR PROBE: CPT | Performed by: INTERNAL MEDICINE

## 2021-03-19 PROCEDURE — 87660 TRICHOMONAS VAGIN DIR PROBE: CPT | Performed by: INTERNAL MEDICINE

## 2021-03-19 NOTE — TELEPHONE ENCOUNTER
Patient had an appointment today wit dr Daniele Alonzo and forgot to ask if it was okay for her to get a covid vaccine. Patient stated in the past she has had an allergic reaction to vaccines. Please advise.

## 2021-03-19 NOTE — PROGRESS NOTES
[facility] Internal Medicine Office Note  Chief Complaint:   Patient presents with:  Urgent Care F/u: Due to dysuria along with some odor   Varicose Veins: Rt arm       HPI:   This is a 45year old female coming in for dysuria and odor   HPI  10 Tomas Ramesh. Yes    Drug use: No    Allergies:    Wasps                   SWELLING  Coffee Bean Extract*    OTHER (SEE COMMENTS)    Comment:Per allergy testing  Corn                    UNKNOWN    Comment:Swelling, inflammation, SOB  Erythromycin            OTHER (SEE C (Approximate)   SpO2 98%   BMI 31.01 kg/m²  Estimated body mass index is 31.01 kg/m² as calculated from the following:    Height as of this encounter: 5' 7\" (1.702 m). Weight as of this encounter: 198 lb (89.8 kg). Vital signs reviewed.  Appears state [E]      Vaginitis/Vaginosis, Dna Probe      Meds & Refills for this Visit:  Requested Prescriptions      No prescriptions requested or ordered in this encounter       Imaging & Consults:  UROLOGY - INTERNAL  VASCULAR SURGERY - INTERNAL    Diabetes Care Klevre Donis

## 2021-03-19 NOTE — TELEPHONE ENCOUNTER
Pt considering getting covid vaccine and would like to know if she would be safe to receive with her given hx. She also would like to know if the Samantha Cellar would be better for her.      Patient states she once went into shock while receiving immunothe

## 2021-03-19 NOTE — TELEPHONE ENCOUNTER
Yes I do recommend the covid vaccine. Immunotherapy is known to have an allergic reaction potential, and this should not increase her risk of allergic reaction to covid vaccine.  Whichever is available to her is ok with me - she may not have a choice when o

## 2021-03-19 NOTE — PATIENT INSTRUCTIONS
Urine culture at lab today/tomorrow    Follow up with Dr. Ruben Horowitz urologist if symptoms persist     Dr. Barbara Walters or Dr. Sandra Mejias - vascular/vein specialist

## 2021-03-24 ENCOUNTER — TELEPHONE (OUTPATIENT)
Dept: INTERNAL MEDICINE CLINIC | Facility: CLINIC | Age: 51
End: 2021-03-24

## 2021-03-24 DIAGNOSIS — E11.8 TYPE 2 DIABETES MELLITUS WITH COMPLICATION, WITHOUT LONG-TERM CURRENT USE OF INSULIN (HCC): Primary | ICD-10-CM

## 2021-03-24 DIAGNOSIS — R73.01 ELEVATED FASTING GLUCOSE: ICD-10-CM

## 2021-03-24 NOTE — TELEPHONE ENCOUNTER
1/ Pt rcv'd automated msg she is due for repeat A1C.   Orders needed if appropriate    2/ Is repeat urine appropriate/necessary d/t high glucose result from 3/19 test ?

## 2021-03-26 ENCOUNTER — LAB ENCOUNTER (OUTPATIENT)
Dept: LAB | Age: 51
End: 2021-03-26
Attending: INTERNAL MEDICINE
Payer: COMMERCIAL

## 2021-03-26 DIAGNOSIS — E11.8 TYPE 2 DIABETES MELLITUS WITH COMPLICATION, WITHOUT LONG-TERM CURRENT USE OF INSULIN (HCC): ICD-10-CM

## 2021-03-26 DIAGNOSIS — R39.15 URINARY URGENCY: ICD-10-CM

## 2021-03-26 LAB
CREAT UR-SCNC: 59 MG/DL
EST. AVERAGE GLUCOSE BLD GHB EST-MCNC: 134 MG/DL (ref 68–126)
HBA1C MFR BLD HPLC: 6.3 % (ref ?–5.7)
MICROALBUMIN UR-MCNC: 0.68 MG/DL
MICROALBUMIN/CREAT 24H UR-RTO: 11.5 UG/MG (ref ?–30)

## 2021-03-26 PROCEDURE — 82043 UR ALBUMIN QUANTITATIVE: CPT | Performed by: INTERNAL MEDICINE

## 2021-03-26 PROCEDURE — 3044F HG A1C LEVEL LT 7.0%: CPT | Performed by: NURSE PRACTITIONER

## 2021-03-26 PROCEDURE — 83036 HEMOGLOBIN GLYCOSYLATED A1C: CPT | Performed by: INTERNAL MEDICINE

## 2021-03-26 PROCEDURE — 3061F NEG MICROALBUMINURIA REV: CPT | Performed by: NURSE PRACTITIONER

## 2021-03-26 PROCEDURE — 36415 COLL VENOUS BLD VENIPUNCTURE: CPT | Performed by: INTERNAL MEDICINE

## 2021-03-26 PROCEDURE — 87086 URINE CULTURE/COLONY COUNT: CPT | Performed by: INTERNAL MEDICINE

## 2021-03-26 PROCEDURE — 82570 ASSAY OF URINE CREATININE: CPT | Performed by: INTERNAL MEDICINE

## 2021-03-29 NOTE — TELEPHONE ENCOUNTER
Pt provided test results and instructions. Asking Dr Price Giraldo if phentermine had caused her urine glucose level to be elevated on 3/19 and if urinalysis needs to be repeated.

## 2021-03-29 NOTE — TELEPHONE ENCOUNTER
Please call patient back: No, phentermine would not cause glucose to be in the urine. When glucose is high in the blood stream, such as in pre-diabetes/diabetes, the body tries to rid itself of extra sugar by urinating out some.  Also, the urine dip done in

## 2021-04-08 ENCOUNTER — OFFICE VISIT (OUTPATIENT)
Dept: INTERNAL MEDICINE CLINIC | Facility: CLINIC | Age: 51
End: 2021-04-08
Payer: COMMERCIAL

## 2021-04-08 VITALS
BODY MASS INDEX: 31.23 KG/M2 | WEIGHT: 199 LBS | HEIGHT: 67 IN | SYSTOLIC BLOOD PRESSURE: 96 MMHG | HEART RATE: 92 BPM | DIASTOLIC BLOOD PRESSURE: 60 MMHG

## 2021-04-08 DIAGNOSIS — Z51.81 THERAPEUTIC DRUG MONITORING: Primary | ICD-10-CM

## 2021-04-08 DIAGNOSIS — R03.0 ELEVATED BLOOD PRESSURE READING: ICD-10-CM

## 2021-04-08 DIAGNOSIS — E11.8 TYPE 2 DIABETES MELLITUS WITH COMPLICATION, WITHOUT LONG-TERM CURRENT USE OF INSULIN (HCC): ICD-10-CM

## 2021-04-08 DIAGNOSIS — E78.00 PURE HYPERCHOLESTEROLEMIA: ICD-10-CM

## 2021-04-08 DIAGNOSIS — E66.9 OBESITY (BMI 30-39.9): ICD-10-CM

## 2021-04-08 PROCEDURE — 3008F BODY MASS INDEX DOCD: CPT | Performed by: NURSE PRACTITIONER

## 2021-04-08 PROCEDURE — 99214 OFFICE O/P EST MOD 30 MIN: CPT | Performed by: NURSE PRACTITIONER

## 2021-04-08 PROCEDURE — 3078F DIAST BP <80 MM HG: CPT | Performed by: NURSE PRACTITIONER

## 2021-04-08 PROCEDURE — 3074F SYST BP LT 130 MM HG: CPT | Performed by: NURSE PRACTITIONER

## 2021-04-08 RX ORDER — PHENTERMINE HYDROCHLORIDE 15 MG/1
15 CAPSULE ORAL EVERY MORNING
Qty: 30 CAPSULE | Refills: 1 | Status: SHIPPED | OUTPATIENT
Start: 2021-04-08 | End: 2021-06-18

## 2021-04-08 NOTE — PROGRESS NOTES
HISTORY OF PRESENT ILLNESS  Patient presents with:  Weight Check: down 5#, pt came back from a vacation before [facility] is a 45year old female here for follow up with medical weight loss program for the treatment of overweight, obesity, rashes, no suspicious lesions  HEENT: conjunctiva pink, sclera non icteric, PERRLA  NECK: supple, no adenopathy  LUNGS: CTA in all fields, breathing non labored  CARDIO: RRR without murmur  GI: +BS, NT/ND, no masses or HSM  EXTREMITIES: no cyanosis, no clu (Patient not taking: Reported on 4/8/2021 ), Disp: 15 g, Rfl: 2  Clindamycin Phosphate 1 % External Lotion, Apply to AA BID (Patient not taking: Reported on 4/8/2021 ), Disp: 60 mL, Rfl: 0    No current facility-administered medications on file prior to vi recommendations (150-300 minutes/ week in active weight loss)   · Weight Loss Consent to treat reviewed and signed. Total time spent on chart review, pre-charting, obtaining history, counseling, and educating, reviewing labs was 32 minutes.        Can or need to adjust settings:                Goals should include:                  Lose 1.5-2 lbs per week                Activity level: not very active (can't count exercise towards calorie number per day)                   ** Daily INPUT> Look at nutritio

## 2021-04-08 NOTE — PATIENT INSTRUCTIONS
We are here to support you with weight loss, but please remember that you still need your primary care provider for your routine health maintenance.       PLAN:  Continue with phentermine 15mg  Schedule follow up appointments: Chidi Fish (dietitian) or Andrei Ibrahim \"nutrients\" and it will break down your macros for the day (ie. Protein, carbs, fibers, sugars and fats). Try to stay within these numbers daily     2.  \"7 minute workout\" to help with exercise/activity which takes 7 minutes of your day and that you can

## 2021-04-09 PROBLEM — N30.90 CYSTITIS: Status: ACTIVE | Noted: 2021-04-09

## 2021-04-15 NOTE — TELEPHONE ENCOUNTER
Faxed ROR to [facility] in [facility] for pts last Diabetic Eye Exam.   Copy in [email or web address] POD 1. Unknown if ever smoked

## 2021-05-10 ENCOUNTER — OFFICE VISIT (OUTPATIENT)
Dept: INTERNAL MEDICINE CLINIC | Facility: CLINIC | Age: 51
End: 2021-05-10
Payer: COMMERCIAL

## 2021-05-10 VITALS
RESPIRATION RATE: 16 BRPM | HEIGHT: 67 IN | DIASTOLIC BLOOD PRESSURE: 68 MMHG | SYSTOLIC BLOOD PRESSURE: 120 MMHG | TEMPERATURE: 99 F | WEIGHT: 201 LBS | BODY MASS INDEX: 31.55 KG/M2 | HEART RATE: 80 BPM | OXYGEN SATURATION: 99 %

## 2021-05-10 DIAGNOSIS — D17.79 LIPOMA OF OTHER SPECIFIED SITES: Primary | ICD-10-CM

## 2021-05-10 DIAGNOSIS — E11.8 TYPE 2 DIABETES MELLITUS WITH COMPLICATION, WITHOUT LONG-TERM CURRENT USE OF INSULIN (HCC): ICD-10-CM

## 2021-05-10 DIAGNOSIS — E78.2 MIXED HYPERLIPIDEMIA: ICD-10-CM

## 2021-05-10 PROCEDURE — 99214 OFFICE O/P EST MOD 30 MIN: CPT | Performed by: INTERNAL MEDICINE

## 2021-05-10 PROCEDURE — 3078F DIAST BP <80 MM HG: CPT | Performed by: INTERNAL MEDICINE

## 2021-05-10 PROCEDURE — 3074F SYST BP LT 130 MM HG: CPT | Performed by: INTERNAL MEDICINE

## 2021-05-10 PROCEDURE — 3008F BODY MASS INDEX DOCD: CPT | Performed by: INTERNAL MEDICINE

## 2021-05-10 NOTE — PROGRESS NOTES
Patient Office Visit    ASSESSMENT AND PLAN:   (D17.79) Lipoma of other specified sites  (primary encounter diagnosis)  Plan: SURGERY - INTERNAL  Note: referral placed     (Y44.8) Mixed hyperlipidemia  Plan: diet controlled     (E11.8) Type 2 diabetes mallorie whether removing it will help with symptoms    2.  Patient also has a painful vein on her right upper arm: she has called several surgeons who only do varicose veins in the lower extremities    Other chronic concerns  Diabetes: well controlled with diet  Hy Paternal Grandmother      Allergies:    Wasps                   SWELLING  Coffee Bean Extract*    OTHER (SEE COMMENTS)    Comment:Per allergy testing  Corn                    UNKNOWN    Comment:Swelling, inflammation, SOB  Erythromycin            OTHER (SE 7\" (1.702 m)   Wt 201 lb (91.2 kg)   LMP 10/02/2018 (Approximate)   SpO2 99%   BMI 31.48 kg/m²     PHYSICAL EXAM:   Constitutional: Vital signs reviewed as noted, well developed, in no acute distress.    HENT: NCAT  Eyes: pupils reactive bilaterally  Cardi

## 2021-05-26 ENCOUNTER — OFFICE VISIT (OUTPATIENT)
Dept: SURGERY | Facility: CLINIC | Age: 51
End: 2021-05-26
Payer: COMMERCIAL

## 2021-05-26 ENCOUNTER — TELEPHONE (OUTPATIENT)
Dept: INTERNAL MEDICINE CLINIC | Facility: CLINIC | Age: 51
End: 2021-05-26

## 2021-05-26 DIAGNOSIS — D17.1 LIPOMA OF TORSO: Primary | ICD-10-CM

## 2021-05-26 DIAGNOSIS — Z01.818 PRE-OP TESTING: ICD-10-CM

## 2021-05-26 PROCEDURE — 99243 OFF/OP CNSLTJ NEW/EST LOW 30: CPT | Performed by: SURGERY

## 2021-05-26 NOTE — TELEPHONE ENCOUNTER
Patient is scheduled for surgical removal of a lipoma under general anesthesia on June 10th. She was told she must hold the phentermine 2 weeks prior and to call you for verification.  I informed patient she can stop the phentermine without issue and she w

## 2021-05-26 NOTE — PATIENT INSTRUCTIONS
Understanding a Lipoma     A lipoma is a lump under the skin that’s made of fat. It’s not cancer (benign). It feels soft like rubber when you press it, and in most cases it doesn’t hurt. Some people have more than one.  A lipoma grows slowly over time and or other tissues and cause problems. For example, it can cause problems with breathing or digestion. Living with a lipoma  Your healthcare provider may look at the lipoma during regular checkups to see if it changes or is causing problems.   When to call y

## 2021-05-26 NOTE — H&P
New Patient Visit Note       Active Problems      1.  Lipoma of torso        Chief Complaint   Patient presents with:  Cyst: NP cyst on right shoulder, pt c/o of pain      History of Present Illness     Patient presents at the request of her primary care ph 12/7/2018    Procedure: VAGINAL HYSTERECTOMY;  Surgeon: Penelope Mcfarland MD;  Location: [facility] MAIN OR       The family history and social history have been reviewed by me today.     Family History   Problem Relation Age of Onset   • Diabetes Sister    • O Acidophilus/Pectin Oral Cap, Take 1 capsule by mouth daily. , Disp: , Rfl:       Review of Systems  The Review of Systems has been reviewed by me during today.   Review of Systems   Constitutional: Negative for chills, diaphoresis, fatigue, fever and unexpec splenomegaly, mass or pulsatile mass. Tenderness: There is no abdominal tenderness. There is no guarding or rebound. Negative signs include Naylor's sign and McBurney's sign. Hernia: There is no hernia in the umbilical area or ventral area.    Ski

## 2021-06-01 ENCOUNTER — LAB ENCOUNTER (OUTPATIENT)
Dept: LAB | Age: 51
End: 2021-06-01
Attending: SURGERY
Payer: COMMERCIAL

## 2021-06-01 DIAGNOSIS — D17.1 LIPOMA OF TORSO: ICD-10-CM

## 2021-06-01 DIAGNOSIS — Z01.818 PRE-OP TESTING: ICD-10-CM

## 2021-06-01 PROCEDURE — 36415 COLL VENOUS BLD VENIPUNCTURE: CPT

## 2021-06-01 PROCEDURE — 80053 COMPREHEN METABOLIC PANEL: CPT

## 2021-06-07 ENCOUNTER — LAB ENCOUNTER (OUTPATIENT)
Dept: LAB | Age: 51
End: 2021-06-07
Attending: SURGERY
Payer: COMMERCIAL

## 2021-06-07 DIAGNOSIS — Z01.818 PRE-OP TESTING: ICD-10-CM

## 2021-06-10 ENCOUNTER — LAB REQUISITION (OUTPATIENT)
Dept: LAB | Facility: HOSPITAL | Age: 51
End: 2021-06-10
Payer: COMMERCIAL

## 2021-06-10 DIAGNOSIS — D17.1 BENIGN LIPOMATOUS NEOPLASM OF SKIN AND SUBCUTANEOUS TISSUE OF TRUNK: ICD-10-CM

## 2021-06-10 PROCEDURE — 88304 TISSUE EXAM BY PATHOLOGIST: CPT | Performed by: SURGERY

## 2021-06-18 ENCOUNTER — TELEMEDICINE (OUTPATIENT)
Dept: INTERNAL MEDICINE CLINIC | Facility: CLINIC | Age: 51
End: 2021-06-18

## 2021-06-18 VITALS — WEIGHT: 199 LBS | BODY MASS INDEX: 31 KG/M2

## 2021-06-18 DIAGNOSIS — E78.2 MIXED HYPERLIPIDEMIA: ICD-10-CM

## 2021-06-18 DIAGNOSIS — K76.0 FATTY LIVER: ICD-10-CM

## 2021-06-18 DIAGNOSIS — Z51.81 THERAPEUTIC DRUG MONITORING: Primary | ICD-10-CM

## 2021-06-18 DIAGNOSIS — R73.01 ELEVATED FASTING GLUCOSE: ICD-10-CM

## 2021-06-18 DIAGNOSIS — E66.9 OBESITY (BMI 30-39.9): ICD-10-CM

## 2021-06-18 DIAGNOSIS — E11.8 TYPE 2 DIABETES MELLITUS WITH COMPLICATION, WITHOUT LONG-TERM CURRENT USE OF INSULIN (HCC): ICD-10-CM

## 2021-06-18 PROCEDURE — 99213 OFFICE O/P EST LOW 20 MIN: CPT | Performed by: NURSE PRACTITIONER

## 2021-06-18 RX ORDER — PHENTERMINE HYDROCHLORIDE 15 MG/1
15 CAPSULE ORAL EVERY MORNING
Qty: 30 CAPSULE | Refills: 1 | Status: SHIPPED | OUTPATIENT
Start: 2021-06-18 | End: 2021-08-13

## 2021-06-18 NOTE — PATIENT INSTRUCTIONS
We are here to support you with weight loss, but please remember that you still need your primary care provider for your routine health maintenance.       PLAN:  Will continue with phentermine 15mg and add in metformin (1 tablet with breakfast and 1 tablet exercise towards calorie number per day)                   ** Daily INPUT> Look at nutrition section-- \"nutrients\" and it will break down your macros for the day (ie. Protein, carbs, fibers, sugars and fats). Try to stay within these numbers daily     2.

## 2021-06-18 NOTE — PROGRESS NOTES
[facility] WEIGHT MANAGEMENT VIRTUAL ENCOUNTER     Ngozi Mathew verbally consents to a Virtual/Telephone Check-In service on 06/18/21   Patient understands and accepts financial responsibility for any deductible, co-insurance and/or co-pays associ speaking in full sentences comfortably   SKIN: warm, pink, dry without rashes to exposed area   EYES: conjunctiva pink  HEENT: atraumatic, normocephalic  LUNGS: normal work of breathing, non labored  CARDIO: normal work, no exertion  EXTREMITIES: no cyanos Auto-injector, Inject 0.3 mL (1 each total) as directed as needed. , Disp: 1 each, Rfl: 3  Cholecalciferol (VITAMIN D) 1000 UNITS Oral Tab, Take 1,000 Units by mouth daily. , Disp: , Rfl:   Acidophilus/Pectin Oral Cap, Take 1 capsule by mouth daily. , Disp: recommended. · Discussed the role of sleep and stress in weight management. · Counseled on comprehensive weight loss plan including attention to nutrition, exercise and behavior/stress management for success.  See patient instruction below for more detail

## 2021-06-21 ENCOUNTER — OFFICE VISIT (OUTPATIENT)
Dept: SURGERY | Facility: CLINIC | Age: 51
End: 2021-06-21

## 2021-06-21 VITALS
BODY MASS INDEX: 29.82 KG/M2 | WEIGHT: 190 LBS | DIASTOLIC BLOOD PRESSURE: 86 MMHG | HEART RATE: 89 BPM | TEMPERATURE: 97 F | SYSTOLIC BLOOD PRESSURE: 127 MMHG | HEIGHT: 67 IN

## 2021-06-21 DIAGNOSIS — D17.1 LIPOMA OF TORSO: Primary | ICD-10-CM

## 2021-06-21 PROCEDURE — 3074F SYST BP LT 130 MM HG: CPT | Performed by: PHYSICIAN ASSISTANT

## 2021-06-21 PROCEDURE — 3008F BODY MASS INDEX DOCD: CPT | Performed by: PHYSICIAN ASSISTANT

## 2021-06-21 PROCEDURE — 3079F DIAST BP 80-89 MM HG: CPT | Performed by: PHYSICIAN ASSISTANT

## 2021-06-21 PROCEDURE — 99024 POSTOP FOLLOW-UP VISIT: CPT | Performed by: PHYSICIAN ASSISTANT

## 2021-06-21 NOTE — PROGRESS NOTES
Post Operative Visit Note       Active Problems  1. Lipoma of torso         Chief Complaint   Patient presents with:  Lump: PO - excision of lipoma on back -- Pt states area feels a little irritated and uncomfortable.  Denies and severe pain, fever or chill contact lenses   • Wears glasses      Past Surgical History:   Procedure Laterality Date   • OTHER SURGICAL HISTORY  06/10/2021    removal of lipoma on back    • VAGINAL HYSTERECTOMY N/A 12/7/2018    Procedure: VAGINAL HYSTERECTOMY;  Surgeon: Abimael Talavera Tab, Take 1 tablet (0.5 mg total) by mouth daily. , Disp: 90 tablet, Rfl: 3  •  AUVI-Q 0.3 MG/0.3ML Injection Solution Auto-injector, Inject 0.3 mL (1 each total) as directed as needed. , Disp: 1 each, Rfl: 3  •  Cholecalciferol (VITAMIN D) 1000 UNITS Oral T and reactive to light. Pulmonary:      Effort: Pulmonary effort is normal. No respiratory distress. Abdominal:      General: There is no distension. Palpations: Abdomen is soft. There is no mass. Tenderness: There is no abdominal tenderness. glue.    · The patient does have a postoperative seroma at the surgical site. Reassured the patient that this will be reabsorbed by her body over time. · No evidence of infection.     · The patient should continue to avoid any heavy lifting and use caut

## 2021-07-21 ENCOUNTER — HOSPITAL ENCOUNTER (OUTPATIENT)
Age: 51
Discharge: HOME | End: 2021-07-21
Payer: COMMERCIAL

## 2021-07-21 ENCOUNTER — APPOINTMENT (OUTPATIENT)
Dept: GENERAL RADIOLOGY | Age: 51
End: 2021-07-21
Attending: NURSE PRACTITIONER
Payer: COMMERCIAL

## 2021-07-21 VITALS
SYSTOLIC BLOOD PRESSURE: 120 MMHG | TEMPERATURE: 98 F | DIASTOLIC BLOOD PRESSURE: 85 MMHG | RESPIRATION RATE: 18 BRPM | OXYGEN SATURATION: 95 % | HEART RATE: 100 BPM

## 2021-07-21 DIAGNOSIS — R05.9 COUGH: Primary | ICD-10-CM

## 2021-07-21 PROCEDURE — 99213 OFFICE O/P EST LOW 20 MIN: CPT | Performed by: NURSE PRACTITIONER

## 2021-07-21 PROCEDURE — 71046 X-RAY EXAM CHEST 2 VIEWS: CPT | Performed by: NURSE PRACTITIONER

## 2021-07-21 RX ORDER — ALBUTEROL SULFATE 90 UG/1
2 AEROSOL, METERED RESPIRATORY (INHALATION) EVERY 4 HOURS PRN
Qty: 1 EACH | Refills: 0 | Status: SHIPPED | OUTPATIENT
Start: 2021-07-21 | End: 2021-08-20

## 2021-07-21 RX ORDER — PREDNISONE 20 MG/1
40 TABLET ORAL DAILY
Qty: 10 TABLET | Refills: 0 | Status: SHIPPED | OUTPATIENT
Start: 2021-07-21 | End: 2021-07-26

## 2021-07-21 RX ORDER — BENZONATATE 100 MG/1
100 CAPSULE ORAL 3 TIMES DAILY PRN
Qty: 30 CAPSULE | Refills: 0 | Status: SHIPPED | OUTPATIENT
Start: 2021-07-21 | End: 2021-08-20

## 2021-07-21 NOTE — ED INITIAL ASSESSMENT (HPI)
Pt with c/o productive cough that started on Monday. Pt denies fevers. Pt  currently being treated for bronchitis.   Also c/o bloating

## 2021-07-21 NOTE — ED PROVIDER NOTES
Patient Seen in: Immediate [address] [place] [address]      History   Patient presents with:  Cough/URI    Stated Complaint: COUGH    HPI/Subjective:   HPI  62-year-old female presents immediate care complaining of a productive cough with yellow sputum along with nasal COUGH  Other systems are as noted in HPI. Constitutional and vital signs reviewed. All other systems reviewed and negative except as noted above.     Physical Exam     ED Triage Vitals [07/21/21 1505]   /85   Pulse 100   Resp 18   Temp 97.5 °F ( silhouette and pulmonary vasculature within normal limits. No focal consolidation, pneumothorax or pleural effusion. CONCLUSION:  No focal consolidation.     Dictated by (CST): Brittany Saenz MD on 7/21/2021 at 4:08 PM     Finalized by (CST): John Collazo

## 2021-07-24 ENCOUNTER — MOBILE ENCOUNTER (OUTPATIENT)
Dept: INTERNAL MEDICINE CLINIC | Facility: CLINIC | Age: 51
End: 2021-07-24

## 2021-07-24 RX ORDER — DOXYCYCLINE HYCLATE 100 MG/1
100 CAPSULE ORAL 2 TIMES DAILY
Qty: 10 CAPSULE | Refills: 0 | Status: SHIPPED | OUTPATIENT
Start: 2021-07-24 | End: 2021-10-07

## 2021-07-25 ENCOUNTER — TELEPHONE (OUTPATIENT)
Dept: INTERNAL MEDICINE CLINIC | Facility: CLINIC | Age: 51
End: 2021-07-25

## 2021-07-25 DIAGNOSIS — R06.00 DOE (DYSPNEA ON EXERTION): Primary | ICD-10-CM

## 2021-07-25 NOTE — TELEPHONE ENCOUNTER
Patient states she has been sick with cough and chest congestion for 8 days. She presented to ED on 7/21 and was treated for bronchitis with prednisone and albuterol.  Her chest symptmos are improving but for the past 24 hours she is experiencing a lot of s

## 2021-08-02 ENCOUNTER — TELEPHONE (OUTPATIENT)
Dept: INTERNAL MEDICINE CLINIC | Facility: CLINIC | Age: 51
End: 2021-08-02

## 2021-08-02 NOTE — TELEPHONE ENCOUNTER
Pt seen in UC on 7/21. Better since UC visit but remains with dry cough, PND and sinus pressure. No c/o fever, chills, sweats, sob or wheezing. Done with steroids and feels as though she no longer needs inhaler.   Never started the antibiotic as she felt

## 2021-08-02 NOTE — TELEPHONE ENCOUNTER
Patient called requesting appointment for UC f/u bronchitis. Patient complains of ongoing upper respiratory symptoms. Advised virtual appointment, but patient would like to come in office in order for doctor to listen to her breathing.  Please call to pat

## 2021-08-03 NOTE — TELEPHONE ENCOUNTER
Evelina Lewis for in office appointment since more than 10 days since onset of symptoms. Let her know since symptoms are persisting, I recommend starting the doxycycline antibiotic.    Ok for 15 min appt LDH in 640 -> 585 - Received 250 ml fluids on 7/4. Repeat TTE. LFT normal.  Will c/w coumadin. INR 1.5 -> 1.8 today. Coumadin 7.5 mg. Given risk of GI bleed, patient not a candidate for heparin bridge. Also given concern risk of bleeding, hold off on starting persantine. C/W ASA 81 mg daily. Likely LDH elevation as an acute phase reactant from infection.   - INR 1.5 -> 1.8. Goal of 2-3   - given history of GI bleed in 2019 , c/w coumadin  - patient denies any dark urine and no obvious scleral icterus on exam  - drive site clean and dry but will send site cultures.    Procedure: Device Interrogation Including analysis of device parameters  Current Settings: Ventricular Assist Device  Review of device function is stable/unstable stable    TXP LVAD INTERROGATIONS 7/5/2020 7/4/2020 7/4/2020 7/4/2020 7/4/2020 7/3/2020 7/3/2020   Type HeartMate II HeartMate II - - HeartMate II HeartMate II HeartMate II   Flow 5.0 530 4.8 4.8 4.8 4.7 5.3   Speed 9800 9800 9800 9800 9800 9800 9800   PI 3.8 4.0 4.0 3.2 4.0 2.5 2.4   Power ([place]) 6.0 6.2 6.0 6.1 6.0 6.0 6.1   LSL - 9400 9400 - - 9400 -   Pulsatility No Pulse No Pulse No Pulse No Pulse No Pulse No Pulse No Pulse

## 2021-08-03 NOTE — TELEPHONE ENCOUNTER
Pt started the antibiotic last night and feels as though it is helping. Would like to wait a couple days to see if symptoms resolve before scheduling a visit.

## 2021-08-06 ENCOUNTER — HOSPITAL ENCOUNTER (OUTPATIENT)
Age: 51
Discharge: HOME | End: 2021-08-06
Payer: COMMERCIAL

## 2021-08-06 ENCOUNTER — TELEPHONE (OUTPATIENT)
Dept: INTERNAL MEDICINE CLINIC | Facility: CLINIC | Age: 51
End: 2021-08-06

## 2021-08-06 VITALS
SYSTOLIC BLOOD PRESSURE: 129 MMHG | TEMPERATURE: 100 F | RESPIRATION RATE: 16 BRPM | HEART RATE: 100 BPM | OXYGEN SATURATION: 98 % | DIASTOLIC BLOOD PRESSURE: 68 MMHG

## 2021-08-06 DIAGNOSIS — U07.1 COVID-19: Primary | ICD-10-CM

## 2021-08-06 LAB — SARS-COV-2 RNA RESP QL NAA+PROBE: DETECTED

## 2021-08-06 PROCEDURE — A9150 MISC/EXPER NON-PRESCRIPT DRU: HCPCS | Performed by: PHYSICIAN ASSISTANT

## 2021-08-06 PROCEDURE — C9399 UNCLASSIFIED DRUGS OR BIOLOG: HCPCS | Performed by: PHYSICIAN ASSISTANT

## 2021-08-06 PROCEDURE — 99213 OFFICE O/P EST LOW 20 MIN: CPT | Performed by: PHYSICIAN ASSISTANT

## 2021-08-06 PROCEDURE — M0243 CASIRIVI AND IMDEVI INFUSION: HCPCS | Performed by: PHYSICIAN ASSISTANT

## 2021-08-06 PROCEDURE — U0002 COVID-19 LAB TEST NON-CDC: HCPCS | Performed by: PHYSICIAN ASSISTANT

## 2021-08-06 RX ORDER — ACETAMINOPHEN 500 MG
1000 TABLET ORAL ONCE
Status: COMPLETED | OUTPATIENT
Start: 2021-08-06 | End: 2021-08-06

## 2021-08-06 RX ORDER — SODIUM CHLORIDE 9 MG/ML
1000 INJECTION, SOLUTION INTRAVENOUS ONCE
Status: COMPLETED | OUTPATIENT
Start: 2021-08-06 | End: 2021-08-06

## 2021-08-06 NOTE — ED PROVIDER NOTES
Patient Seen in: Immediate [address] [place] [address]      History   Patient presents with:  Cough  Sinusitis  Testing    Stated Complaint: [phone] in car/coughing     HPI/Subjective:   HPI    54-year-old female with past medical history as noted below presents to Systems    Positive for stated complaint: [phone] in car/coughing   Other systems are as noted in HPI. Constitutional and vital signs reviewed. All other systems reviewed and negative except as noted above.     Physical Exam     ED Triage Vitals treatment and received infusion. Vital signs are stable throughout entire course. Patient was monitored and there is no signs of reactions. Informed patient to monitor her symptoms at home and to return if there are any worsening symptoms.   Pulse oximet results and when to return if worse. The patient/caregiver verbalized understanding of the instructions.            Medications Prescribed:  Current Discharge Medication List

## 2021-08-06 NOTE — TELEPHONE ENCOUNTER
Patient's dad tested positive for COVID19 and was admitted to the hospital on Tuesday or Wednesday. Patient was around her dad on Sunday. Patient is vaccinated.  She wants to know what the protocol is for when you are around someone who tested positive for

## 2021-08-06 NOTE — ED INITIAL ASSESSMENT (HPI)
Patient states she has had a nonproductive cough since 7/17/21. Was treated in [facility] for bronchitis with and Albuterol inhaler. Her PCP phoned in doxycycline for a sinus infection on 7/24/21. States her symptoms have gotten worse since Sunday.  Her father norma

## 2021-08-06 NOTE — TELEPHONE ENCOUNTER
Per the David Maria if you have been in close contact (within 6 feet of someone for a cumulative total of 15 minutes or more over a 24-hour period) with someone who has COVID-19, unless you have been fully vaccinated.  People who are fully vac

## 2021-08-13 ENCOUNTER — TELEPHONE (OUTPATIENT)
Dept: INTERNAL MEDICINE CLINIC | Facility: CLINIC | Age: 51
End: 2021-08-13

## 2021-08-13 ENCOUNTER — TELEMEDICINE (OUTPATIENT)
Dept: INTERNAL MEDICINE CLINIC | Facility: CLINIC | Age: 51
End: 2021-08-13
Payer: COMMERCIAL

## 2021-08-13 DIAGNOSIS — E66.3 OVERWEIGHT (BMI 25.0-29.9): ICD-10-CM

## 2021-08-13 DIAGNOSIS — E78.2 MIXED HYPERLIPIDEMIA: ICD-10-CM

## 2021-08-13 DIAGNOSIS — E11.8 TYPE 2 DIABETES MELLITUS WITH COMPLICATION, WITHOUT LONG-TERM CURRENT USE OF INSULIN (HCC): ICD-10-CM

## 2021-08-13 DIAGNOSIS — K76.0 FATTY LIVER: ICD-10-CM

## 2021-08-13 DIAGNOSIS — Z51.81 THERAPEUTIC DRUG MONITORING: Primary | ICD-10-CM

## 2021-08-13 PROCEDURE — 99213 OFFICE O/P EST LOW 20 MIN: CPT | Performed by: NURSE PRACTITIONER

## 2021-08-13 RX ORDER — PHENTERMINE HYDROCHLORIDE 15 MG/1
15 CAPSULE ORAL EVERY MORNING
Qty: 30 CAPSULE | Refills: 1 | Status: SHIPPED | OUTPATIENT
Start: 2021-08-13 | End: 2021-10-07

## 2021-08-13 NOTE — TELEPHONE ENCOUNTER
Patient has video visit at 10 am today and AMF would like to try to change to 9:40 if possible for the patient. I called her and got voicemail.   Left detailed message to let us know if she can - if she cannot switch will still keep the appointment at 10 a

## 2021-08-13 NOTE — PATIENT INSTRUCTIONS
We are here to support you with weight loss, but please remember that you still need your primary care provider for your routine health maintenance.       PLAN:  Will continue with phentermine and metformin   Follow up with me in 8 weeks  Schedule follow up Daily INPUT> Look at nutrition section-- \"nutrients\" and it will break down your macros for the day (ie. Protein, carbs, fibers, sugars and fats). Try to stay within these numbers daily     2.  \"7 minute workout\" to help with exercise/activity which sadiq

## 2021-08-13 NOTE — PROGRESS NOTES
[facility] WEIGHT MANAGEMENT VIRTUAL ENCOUNTER     Ngozi Mathew verbally consents to a Virtual/Telephone Check-In service on 08/13/21   Patient understands and accepts financial responsibility for any deductible, co-insurance and/or co-pays associ speaking in full sentences comfortably   SKIN: warm, pink, dry without rashes to exposed area   EYES: conjunctiva pink  HEENT: atraumatic, normocephalic  LUNGS: normal work of breathing, non labored  CARDIO: normal work, no exertion  EXTREMITIES: no cyanos Albuterol Sulfate  (90 Base) MCG/ACT Inhalation Aero Soln, Inhale 2 puffs into the lungs every 4 (four) hours as needed for Wheezing., Disp: 1 each, Rfl: 0  benzonatate 100 MG Oral Cap, Take 1 capsule (100 mg total) by mouth 3 (three) times daily as Recover from covid-19 infection   · Advised to monitor blood pressure and pulse at home/ given parameters to review and contact provider.   · Nutrition: low carb diet/ recommended to eat breakfast daily/ regular protein intake  · Follow up with dietitian an as rice, pasta, potatoes, bread, corn and instead choose whole grain options or more protein or vegetables (4-6 servings of vegetables per day)  6. Get a good night of sleep  7. Try to decrease stress in life     Please download apps:  1.  \"My Fitness Pal\ follow-ups on file. Patient verbalizes understanding. Total time spent on chart review, pre-charting, obtaining history, counseling, and educating, reviewing labs was 25 minutes.        Pt understands phone/video evaluation is not a substitute for fac

## 2021-10-06 ENCOUNTER — LAB ENCOUNTER (OUTPATIENT)
Dept: LAB | Age: 51
End: 2021-10-06
Attending: OTOLARYNGOLOGY
Payer: COMMERCIAL

## 2021-10-06 DIAGNOSIS — E07.9 THYROID DYSFUNCTION: ICD-10-CM

## 2021-10-06 PROCEDURE — 84439 ASSAY OF FREE THYROXINE: CPT

## 2021-10-06 PROCEDURE — 84443 ASSAY THYROID STIM HORMONE: CPT

## 2021-10-06 PROCEDURE — 36415 COLL VENOUS BLD VENIPUNCTURE: CPT

## 2021-10-07 ENCOUNTER — OFFICE VISIT (OUTPATIENT)
Dept: INTERNAL MEDICINE CLINIC | Facility: CLINIC | Age: 51
End: 2021-10-07
Payer: COMMERCIAL

## 2021-10-07 VITALS
WEIGHT: 199 LBS | HEART RATE: 94 BPM | BODY MASS INDEX: 31.23 KG/M2 | OXYGEN SATURATION: 97 % | HEIGHT: 67 IN | RESPIRATION RATE: 17 BRPM | SYSTOLIC BLOOD PRESSURE: 110 MMHG | DIASTOLIC BLOOD PRESSURE: 82 MMHG

## 2021-10-07 DIAGNOSIS — E11.8 TYPE 2 DIABETES MELLITUS WITH COMPLICATION, WITHOUT LONG-TERM CURRENT USE OF INSULIN (HCC): ICD-10-CM

## 2021-10-07 DIAGNOSIS — G89.29 HIP PAIN, CHRONIC, RIGHT: ICD-10-CM

## 2021-10-07 DIAGNOSIS — R53.82 CHRONIC FATIGUE: ICD-10-CM

## 2021-10-07 DIAGNOSIS — E66.9 OBESITY (BMI 30-39.9): ICD-10-CM

## 2021-10-07 DIAGNOSIS — M25.551 HIP PAIN, CHRONIC, RIGHT: ICD-10-CM

## 2021-10-07 DIAGNOSIS — K76.0 FATTY LIVER: ICD-10-CM

## 2021-10-07 DIAGNOSIS — E78.2 MIXED HYPERLIPIDEMIA: ICD-10-CM

## 2021-10-07 DIAGNOSIS — M54.9 MID BACK PAIN: ICD-10-CM

## 2021-10-07 DIAGNOSIS — Z51.81 THERAPEUTIC DRUG MONITORING: Primary | ICD-10-CM

## 2021-10-07 PROCEDURE — 3074F SYST BP LT 130 MM HG: CPT | Performed by: NURSE PRACTITIONER

## 2021-10-07 PROCEDURE — 3008F BODY MASS INDEX DOCD: CPT | Performed by: NURSE PRACTITIONER

## 2021-10-07 PROCEDURE — 3079F DIAST BP 80-89 MM HG: CPT | Performed by: NURSE PRACTITIONER

## 2021-10-07 PROCEDURE — 99214 OFFICE O/P EST MOD 30 MIN: CPT | Performed by: NURSE PRACTITIONER

## 2021-10-07 RX ORDER — PHENTERMINE HYDROCHLORIDE 15 MG/1
15 CAPSULE ORAL EVERY MORNING
Qty: 30 CAPSULE | Refills: 1 | Status: CANCELLED | OUTPATIENT
Start: 2021-10-07

## 2021-10-07 RX ORDER — PHENTERMINE HYDROCHLORIDE 30 MG/1
30 CAPSULE ORAL EVERY MORNING
Qty: 30 CAPSULE | Refills: 1 | Status: SHIPPED | OUTPATIENT
Start: 2021-10-07 | End: 2021-11-10

## 2021-10-07 NOTE — PROGRESS NOTES
STP- relayed results and Dr. Indigo Juares response and recommendations. Patient verbalized understanding and says she saw her results via Mychart and where is says biotin can altar the results. Patient has been taking a biotin supplement for the past month.  Ai Tavarez

## 2021-10-07 NOTE — PROGRESS NOTES
Thyroid labs look good. OK to stay on same dose Cytomel.  OK to refill 90 day with 3 refills if needed and repeat TSH/T4 in 6 months

## 2021-10-07 NOTE — PATIENT INSTRUCTIONS
We are here to support you with weight loss, but please remember that you still need your primary care provider for your routine health maintenance.       PLAN:  Will increase phentermine 30mg daily and continue with metformin 500mg bid  Follow up with me i per day)                   ** Daily INPUT> Look at nutrition section-- \"nutrients\" and it will break down your macros for the day (ie. Protein, carbs, fibers, sugars and fats). Try to stay within these numbers daily     2.  \"7 minute workout\" to help wi Head atraumatic, normal cephalic shape.

## 2021-10-07 NOTE — PROGRESS NOTES
HISTORY OF PRESENT ILLNESS  Patient presents with:  Weight Check: up 901 Annelise is a 45year old female here for follow up with medical weight loss program for the treatment of overweight, obesity, or morbid obesity.      Up #4 lbs  Compliant on breathing non labored  CARDIO: RRR without murmur  GI: +BS, NT/ND, no masses or HSM  EXTREMITIES: no cyanosis, no clubbing, no edema  PSYCH: pleasant, cooperative, normal mood and affect    Lab Results   Component Value Date     (H) 06/01/2021    BU needed (Patient not taking: Reported on 8/6/2021 ), Disp: 15 g, Rfl: 2  AUVI-Q 0.3 MG/0.3ML Injection Solution Auto-injector, Inject 0.3 mL (1 each total) as directed as needed.  (Patient not taking: Reported on 7/21/2021 ), Disp: 1 each, Rfl: 3    No curre nutrition, exercise and behavior/stress management for success. See patient instruction below for more details.   · Discussed strategies to overcome barriers to successful weight loss and weight maintenance  · FITTE: ACSM recommendations (150-300 minutes/ w

## 2021-10-22 ENCOUNTER — OFFICE VISIT (OUTPATIENT)
Dept: INTERNAL MEDICINE CLINIC | Facility: CLINIC | Age: 51
End: 2021-10-22
Payer: COMMERCIAL

## 2021-10-22 VITALS
TEMPERATURE: 98 F | SYSTOLIC BLOOD PRESSURE: 122 MMHG | DIASTOLIC BLOOD PRESSURE: 64 MMHG | RESPIRATION RATE: 16 BRPM | HEIGHT: 67 IN | WEIGHT: 199.38 LBS | BODY MASS INDEX: 31.29 KG/M2 | HEART RATE: 80 BPM | OXYGEN SATURATION: 98 %

## 2021-10-22 DIAGNOSIS — R30.0 DYSURIA: ICD-10-CM

## 2021-10-22 DIAGNOSIS — K59.00 CONSTIPATION, UNSPECIFIED CONSTIPATION TYPE: ICD-10-CM

## 2021-10-22 DIAGNOSIS — Z12.11 SCREENING FOR COLON CANCER: ICD-10-CM

## 2021-10-22 DIAGNOSIS — E11.8 TYPE 2 DIABETES MELLITUS WITH COMPLICATION, WITHOUT LONG-TERM CURRENT USE OF INSULIN (HCC): ICD-10-CM

## 2021-10-22 DIAGNOSIS — Z00.00 ENCOUNTER FOR ROUTINE ADULT MEDICAL EXAMINATION: Primary | ICD-10-CM

## 2021-10-22 DIAGNOSIS — Z00.00 LABORATORY EXAM ORDERED AS PART OF ROUTINE GENERAL MEDICAL EXAMINATION: ICD-10-CM

## 2021-10-22 DIAGNOSIS — Z12.31 SCREENING MAMMOGRAM FOR BREAST CANCER: ICD-10-CM

## 2021-10-22 PROCEDURE — 83036 HEMOGLOBIN GLYCOSYLATED A1C: CPT | Performed by: INTERNAL MEDICINE

## 2021-10-22 PROCEDURE — 3044F HG A1C LEVEL LT 7.0%: CPT | Performed by: INTERNAL MEDICINE

## 2021-10-22 PROCEDURE — 81003 URINALYSIS AUTO W/O SCOPE: CPT | Performed by: INTERNAL MEDICINE

## 2021-10-22 PROCEDURE — 3078F DIAST BP <80 MM HG: CPT | Performed by: INTERNAL MEDICINE

## 2021-10-22 PROCEDURE — 82043 UR ALBUMIN QUANTITATIVE: CPT | Performed by: INTERNAL MEDICINE

## 2021-10-22 PROCEDURE — 3008F BODY MASS INDEX DOCD: CPT | Performed by: INTERNAL MEDICINE

## 2021-10-22 PROCEDURE — 3074F SYST BP LT 130 MM HG: CPT | Performed by: INTERNAL MEDICINE

## 2021-10-22 PROCEDURE — 3061F NEG MICROALBUMINURIA REV: CPT | Performed by: NURSE PRACTITIONER

## 2021-10-22 PROCEDURE — 82570 ASSAY OF URINE CREATININE: CPT | Performed by: INTERNAL MEDICINE

## 2021-10-22 PROCEDURE — 87086 URINE CULTURE/COLONY COUNT: CPT | Performed by: INTERNAL MEDICINE

## 2021-10-22 PROCEDURE — 99396 PREV VISIT EST AGE 40-64: CPT | Performed by: INTERNAL MEDICINE

## 2021-10-22 RX ORDER — NITROFURANTOIN 25; 75 MG/1; MG/1
100 CAPSULE ORAL 2 TIMES DAILY
Qty: 10 CAPSULE | Refills: 0 | Status: SHIPPED | OUTPATIENT
Start: 2021-10-22 | End: 2021-12-07

## 2021-10-22 NOTE — PROGRESS NOTES
[facility] Internal Medicine Office Note  Chief Complaint:   Patient presents with:  UTI: 5xdays ;Mild burning urination; Pelvic discomfort; No diacharge or odor;  No hematuria;Due for cpx , mammo DM eye exam and colonoscopy   Immunization/Injecti Family History and   Social History updated shows  Social History    Tobacco Use      Smoking status: Former Smoker        Packs/day: 0.25        Years: 15.00        Pack years: 3.75        Types: Cigarettes        Quit date: 1/1/2004        Years since [place] Cap Take 1 capsule by mouth daily. REVIEW OF SYSTEMS:   Review of Systems   Constitutional: Negative for fever. HENT: Negative for congestion. Eyes: Negative for visual disturbance. Respiratory: Negative for shortness of breath.     Cardiov breast cancer  Screening for colon cancer  Constipation, unspecified constipation type      The plan is as follows  Perla Monson was seen today for uti and immunization/injection.     Diagnoses and all orders for this visit:    Encounter for routine adult medica Exam Never done  Pneumococcal Vaccine: Birth to 64yrs(1 of 2 - PPSV23) Never done  Colonoscopy Never done  FIT/FOBT Colorectal Screening Never done  Zoster Vaccines(1 of 2) Never done  Annual Depression Screen due on 05/02/2020  Mammogram due on 11/11/2020

## 2021-11-10 ENCOUNTER — OFFICE VISIT (OUTPATIENT)
Dept: INTERNAL MEDICINE CLINIC | Facility: CLINIC | Age: 51
End: 2021-11-10
Payer: COMMERCIAL

## 2021-11-10 VITALS
DIASTOLIC BLOOD PRESSURE: 70 MMHG | SYSTOLIC BLOOD PRESSURE: 114 MMHG | BODY MASS INDEX: 30.61 KG/M2 | WEIGHT: 195 LBS | HEART RATE: 84 BPM | HEIGHT: 67 IN | RESPIRATION RATE: 12 BRPM

## 2021-11-10 DIAGNOSIS — Z51.81 THERAPEUTIC DRUG MONITORING: Primary | ICD-10-CM

## 2021-11-10 DIAGNOSIS — E11.8 TYPE 2 DIABETES MELLITUS WITH COMPLICATION, WITHOUT LONG-TERM CURRENT USE OF INSULIN (HCC): ICD-10-CM

## 2021-11-10 DIAGNOSIS — K76.0 FATTY LIVER: ICD-10-CM

## 2021-11-10 DIAGNOSIS — E78.2 MIXED HYPERLIPIDEMIA: ICD-10-CM

## 2021-11-10 DIAGNOSIS — E66.9 OBESITY (BMI 30-39.9): ICD-10-CM

## 2021-11-10 DIAGNOSIS — R53.82 CHRONIC FATIGUE: ICD-10-CM

## 2021-11-10 PROCEDURE — 3078F DIAST BP <80 MM HG: CPT | Performed by: NURSE PRACTITIONER

## 2021-11-10 PROCEDURE — 99214 OFFICE O/P EST MOD 30 MIN: CPT | Performed by: NURSE PRACTITIONER

## 2021-11-10 PROCEDURE — 3074F SYST BP LT 130 MM HG: CPT | Performed by: NURSE PRACTITIONER

## 2021-11-10 PROCEDURE — 3008F BODY MASS INDEX DOCD: CPT | Performed by: NURSE PRACTITIONER

## 2021-11-10 RX ORDER — PHENTERMINE HYDROCHLORIDE 30 MG/1
30 CAPSULE ORAL EVERY MORNING
Qty: 30 CAPSULE | Refills: 1 | Status: SHIPPED | OUTPATIENT
Start: 2021-12-02

## 2021-11-10 NOTE — PROGRESS NOTES
HISTORY OF PRESENT ILLNESS  Patient presents with:  Weight Check: lost 4    Ngozi LIBRADO eSwell is a 45year old female here for follow up with medical weight loss program for the treatment of overweight, obesity, or morbid obesity.      Down 4 lbs  Compliant NT/ND, no masses or HSM  EXTREMITIES: no cyanosis, no clubbing, no edema  PSYCH: pleasant, cooperative, normal mood and affect    Lab Results   Component Value Date     (H) 06/01/2021    BUN 13 06/01/2021    BUNCREA 16.0 06/01/2021    CREATSERUM 0.8 Dihydrochloride 5 MG Oral Tab, Take 5 mg by mouth every evening. (Patient not taking: Reported on 11/10/2021), Disp: , Rfl:   AUVI-Q 0.3 MG/0.3ML Injection Solution Auto-injector, Inject 0.3 mL (1 each total) as directed as needed.  (Patient not taking: No strategies to overcome barriers to successful weight loss and weight maintenance  · FITTE: ACSM recommendations (150-300 minutes/ week in active weight loss)   · Weight Loss Consent to treat reviewed and signed.     Total time spent on chart review, pre-lori you will be able to see if you are eating the right amount of calories, protein, carbs                With My Fitness Pal-->When you set-up the rocky or need to adjust settings:                Goals should include:                  Lose 1.5-2 lbs per week

## 2021-11-10 NOTE — PATIENT INSTRUCTIONS
We are here to support you with weight loss, but please remember that you still need your primary care provider for your routine health maintenance.       PLAN:  Will continue with phentermine 30mg and metformin    Follow up with me in 4 weeks  Schedule fol ** Daily INPUT> Look at nutrition section-- \"nutrients\" and it will break down your macros for the day (ie. Protein, carbs, fibers, sugars and fats). Try to stay within these numbers daily     2.  \"7 minute workout\" to help with exercise/activity tae

## 2021-12-01 ENCOUNTER — HOSPITAL ENCOUNTER (EMERGENCY)
Age: 51
Discharge: HOME | End: 2021-12-01
Attending: EMERGENCY MEDICINE
Payer: COMMERCIAL

## 2021-12-01 VITALS
RESPIRATION RATE: 18 BRPM | HEIGHT: 67 IN | OXYGEN SATURATION: 99 % | BODY MASS INDEX: 30.13 KG/M2 | DIASTOLIC BLOOD PRESSURE: 95 MMHG | TEMPERATURE: 98 F | HEART RATE: 109 BPM | SYSTOLIC BLOOD PRESSURE: 136 MMHG | WEIGHT: 192 LBS

## 2021-12-01 DIAGNOSIS — N30.91 HEMORRHAGIC CYSTITIS: Primary | ICD-10-CM

## 2021-12-01 PROCEDURE — 87086 URINE CULTURE/COLONY COUNT: CPT | Performed by: EMERGENCY MEDICINE

## 2021-12-01 PROCEDURE — 81015 MICROSCOPIC EXAM OF URINE: CPT | Performed by: EMERGENCY MEDICINE

## 2021-12-01 PROCEDURE — 99283 EMERGENCY DEPT VISIT LOW MDM: CPT

## 2021-12-01 PROCEDURE — 81001 URINALYSIS AUTO W/SCOPE: CPT | Performed by: EMERGENCY MEDICINE

## 2021-12-01 RX ORDER — PHENAZOPYRIDINE HYDROCHLORIDE 200 MG/1
200 TABLET, FILM COATED ORAL 3 TIMES DAILY PRN
Qty: 6 TABLET | Refills: 0 | Status: SHIPPED | OUTPATIENT
Start: 2021-12-01 | End: 2021-12-07

## 2021-12-01 RX ORDER — SULFAMETHOXAZOLE AND TRIMETHOPRIM 800; 160 MG/1; MG/1
1 TABLET ORAL 2 TIMES DAILY
Qty: 10 TABLET | Refills: 0 | Status: SHIPPED | OUTPATIENT
Start: 2021-12-01 | End: 2021-12-29 | Stop reason: ALTCHOICE

## 2021-12-01 NOTE — ED PROVIDER NOTES
Patient Seen in: THE MEDICAL [facility] Emergency Department In [place]      History   Patient presents with:  Eval-G  Urinary Symptoms    Stated Complaint: eval g - urinary     Subjective:   HPI    CHIEF COMPLAINT: Dysuria and hematuria     HISTORY OF PRESENT ILLNES • Other abnormal glucose 09/13/11   • Other and unspecified hyperlipidemia 09/13/11   • Other malaise and fatigue 08/26/11   • Peripheral vertigo, unspecified 09/20/11   • Prediabetes    • Seasonal allergies    • Vertigo     (on and off for 10 yrs)   • V Urine 8.5 (*)     Protein Urine >300 mg/dL (*)     Urobilinogen Urine 4.0 (*)     Leukocyte Esterase Urine Large (*)     All other components within normal limits   UA MICROSCOPIC ONLY, URINE - Abnormal; Notable for the following components:    WBC Urine 2

## 2021-12-01 NOTE — ED INITIAL ASSESSMENT (HPI)
Urinary symptoms - pain and burning - states urinary frequency since 4am - then later this morning c/o blood in urine

## 2021-12-07 ENCOUNTER — OFFICE VISIT (OUTPATIENT)
Dept: INTERNAL MEDICINE CLINIC | Facility: CLINIC | Age: 51
End: 2021-12-07
Payer: COMMERCIAL

## 2021-12-07 VITALS
HEART RATE: 94 BPM | RESPIRATION RATE: 16 BRPM | DIASTOLIC BLOOD PRESSURE: 74 MMHG | SYSTOLIC BLOOD PRESSURE: 100 MMHG | BODY MASS INDEX: 30 KG/M2 | TEMPERATURE: 98 F | HEIGHT: 67 IN | OXYGEN SATURATION: 99 %

## 2021-12-07 DIAGNOSIS — S16.1XXS STRAIN OF NECK MUSCLE, SEQUELA: ICD-10-CM

## 2021-12-07 DIAGNOSIS — M53.3 SACRAL PAIN: Primary | ICD-10-CM

## 2021-12-07 DIAGNOSIS — B37.3 CANDIDA VAGINITIS: ICD-10-CM

## 2021-12-07 PROCEDURE — 3008F BODY MASS INDEX DOCD: CPT | Performed by: INTERNAL MEDICINE

## 2021-12-07 PROCEDURE — 99213 OFFICE O/P EST LOW 20 MIN: CPT | Performed by: INTERNAL MEDICINE

## 2021-12-07 PROCEDURE — 3078F DIAST BP <80 MM HG: CPT | Performed by: INTERNAL MEDICINE

## 2021-12-07 PROCEDURE — 3074F SYST BP LT 130 MM HG: CPT | Performed by: INTERNAL MEDICINE

## 2021-12-07 RX ORDER — FLUCONAZOLE 150 MG/1
TABLET ORAL
Qty: 2 TABLET | Refills: 0 | Status: SHIPPED | OUTPATIENT
Start: 2021-12-07 | End: 2021-12-29 | Stop reason: ALTCHOICE

## 2021-12-07 NOTE — PROGRESS NOTES
Patient Office Visit    ASSESSMENT AND PLAN:   (M53.3) Sacral pain  (primary encounter diagnosis)  Plan: XR SACRUM + COCCYX (MIN 2 VIEWS) (CPT=72220)  Note: x ray ordered per patient request. No abnormalities noted on exam     (B37.3) Candida vaginitis  Pl August 2021. HPI:   Lucio Gómez is a 45year old female who presents for the following concerns     1.  Sacral pain:   - has been going on for many years   - she feels like the bone is wide   - hoping to get an x ray just to make       Sure the date: 2004        Years since quittin.9      Smokeless tobacco: Never Used    Vaping Use      Vaping Use: Never used    Alcohol use: Yes      Comment: social     Drug use: No    Family History:  Family History   Problem Relation Age of Onset   • D current facility-administered medications on file prior to visit.         REVIEW OF SYSTEMS   Constitutional: no fatigue normal energy no weight changes   HENT: normal sinuses and no mouth issues   Eyes: . normal vision no eye pain   Respiratory: normal res

## 2021-12-14 ENCOUNTER — LAB ENCOUNTER (OUTPATIENT)
Dept: LAB | Age: 51
End: 2021-12-14
Attending: INTERNAL MEDICINE
Payer: COMMERCIAL

## 2021-12-14 ENCOUNTER — HOSPITAL ENCOUNTER (OUTPATIENT)
Dept: GENERAL RADIOLOGY | Age: 51
Discharge: HOME | End: 2021-12-14
Attending: INTERNAL MEDICINE
Payer: COMMERCIAL

## 2021-12-14 ENCOUNTER — TELEPHONE (OUTPATIENT)
Dept: INTERNAL MEDICINE CLINIC | Facility: CLINIC | Age: 51
End: 2021-12-14

## 2021-12-14 DIAGNOSIS — Z00.00 LABORATORY EXAM ORDERED AS PART OF ROUTINE GENERAL MEDICAL EXAMINATION: ICD-10-CM

## 2021-12-14 DIAGNOSIS — M53.3 SACRAL PAIN: ICD-10-CM

## 2021-12-14 PROCEDURE — 80053 COMPREHEN METABOLIC PANEL: CPT | Performed by: INTERNAL MEDICINE

## 2021-12-14 PROCEDURE — 85025 COMPLETE CBC W/AUTO DIFF WBC: CPT | Performed by: INTERNAL MEDICINE

## 2021-12-14 PROCEDURE — 72220 X-RAY EXAM SACRUM TAILBONE: CPT | Performed by: INTERNAL MEDICINE

## 2021-12-14 PROCEDURE — 80061 LIPID PANEL: CPT | Performed by: INTERNAL MEDICINE

## 2021-12-14 NOTE — TELEPHONE ENCOUNTER
There is stool present, if she feels constipated she can add more fiber to her diet to avoid constipation.  Metamucil/benefiber are good options    Bisi Barnes DO

## 2021-12-14 NOTE — TELEPHONE ENCOUNTER
pts response to test results/questions below. Kan Manninga, looks like you have a fracture that is chronic, meaning it did not occur right now. Do you recall any trauma?  Inga Gamez DO   Written by Inga Gamez DO on 12/14/2021 10:13 AM C

## 2021-12-14 NOTE — TELEPHONE ENCOUNTER
Pt calling because she has questions and couldn't respond to Dr through [facility].

## 2021-12-15 NOTE — PROGRESS NOTES
HISTORY OF PRESENT ILLNESS  Patient presents with:  Weight Check: wojciech St is a 45year old female here for follow up with medical weight loss program for the treatment of overweight, obesity, or morbid obesity.      Down 0 lbs  Compliant on no cyanosis, no clubbing, no edema    Lab Results   Component Value Date     (H) 12/14/2021    BUN 16 12/14/2021    BUNCREA 16.0 06/01/2021    CREATSERUM 0.87 12/14/2021    ANIONGAP 6 12/14/2021    GFRNAA 77 12/14/2021    GFRAA 89 12/14/2021    CA 9 daily.  , Disp: , Rfl:   Acidophilus/Pectin Oral Cap, Take 1 capsule by mouth daily. , Disp: , Rfl:   PEG 3350-KCl-Na Bicarb-NaCl 420 g Oral Recon Soln, Take as directed by physician (Patient not taking: Reported on 12/16/2021), Disp: 4000 mL, Rfl: 0    No recommendations (150-300 minutes/ week in active weight loss)   · Weight Loss Consent to treat reviewed and signed. Total time spent on chart review, pre-charting, obtaining history, counseling, and educating, reviewing labs was 27 minutes.        There

## 2021-12-16 ENCOUNTER — OFFICE VISIT (OUTPATIENT)
Dept: INTERNAL MEDICINE CLINIC | Facility: CLINIC | Age: 51
End: 2021-12-16
Payer: COMMERCIAL

## 2021-12-16 VITALS
DIASTOLIC BLOOD PRESSURE: 74 MMHG | SYSTOLIC BLOOD PRESSURE: 110 MMHG | WEIGHT: 195 LBS | HEART RATE: 92 BPM | HEIGHT: 67 IN | RESPIRATION RATE: 17 BRPM | OXYGEN SATURATION: 96 % | BODY MASS INDEX: 30.61 KG/M2

## 2021-12-16 DIAGNOSIS — E78.2 MIXED HYPERLIPIDEMIA: ICD-10-CM

## 2021-12-16 DIAGNOSIS — K76.0 FATTY LIVER: ICD-10-CM

## 2021-12-16 DIAGNOSIS — E11.8 TYPE 2 DIABETES MELLITUS WITH COMPLICATION, WITHOUT LONG-TERM CURRENT USE OF INSULIN (HCC): ICD-10-CM

## 2021-12-16 DIAGNOSIS — Z51.81 THERAPEUTIC DRUG MONITORING: Primary | ICD-10-CM

## 2021-12-16 DIAGNOSIS — E66.9 OBESITY (BMI 30-39.9): ICD-10-CM

## 2021-12-16 PROCEDURE — 99213 OFFICE O/P EST LOW 20 MIN: CPT | Performed by: NURSE PRACTITIONER

## 2021-12-16 PROCEDURE — 3078F DIAST BP <80 MM HG: CPT | Performed by: NURSE PRACTITIONER

## 2021-12-16 PROCEDURE — 3074F SYST BP LT 130 MM HG: CPT | Performed by: NURSE PRACTITIONER

## 2021-12-16 PROCEDURE — 3008F BODY MASS INDEX DOCD: CPT | Performed by: NURSE PRACTITIONER

## 2021-12-16 RX ORDER — PHENTERMINE HYDROCHLORIDE 30 MG/1
30 CAPSULE ORAL EVERY MORNING
Qty: 30 CAPSULE | Refills: 1 | Status: CANCELLED | OUTPATIENT
Start: 2021-12-16

## 2021-12-20 ENCOUNTER — TELEPHONE (OUTPATIENT)
Dept: INTERNAL MEDICINE CLINIC | Facility: CLINIC | Age: 51
End: 2021-12-20

## 2021-12-20 NOTE — TELEPHONE ENCOUNTER
Pt informed of test results and instructions. Contact information provided for Dr. Lev Hernandez and contact info  to schedule heart scan. Pt requesting to personally speak with Dr Julisa Lal to discuss results further.

## 2021-12-22 NOTE — TELEPHONE ENCOUNTER
Discussed results with patient    She notes she had a UTI on 12/1/21 and asks if rheumatologic issues and fibromyalgia would account for the lab results. Discussed she needs to meet with hematology for further testing.

## 2021-12-28 NOTE — PROGRESS NOTES
THE [facility] Hematology and Oncology Clinic Note    Diagnosis: Leukocytosis/Lymphocytosis    Treatment History: n/a    Visit Diagnosis:  Leukocytosis, unspecified type  (primary encounter diagnosis)    History of Present Illness: 51F with a PMH of HLD, DM2, fibr morning. (Patient not taking: Reported on 12/29/2021), Disp: 30 capsule, Rfl: 1  AUVI-Q 0.3 MG/0.3ML Injection Solution Auto-injector, Inject 0.3 mL (1 each total) as directed as needed.  (Patient not taking: Reported on 12/29/2021), Disp: 1 each, Rfl: 3 • Ovarian Cancer Sister    • Other (ovarian cancer) Sister    • Stroke Other    • Cancer Other    • Diabetes Father    • Hypertension Father    • Other (thyroid disease) Mother    • Other (lymphoma) Mother    • No Known Problems Sister    • Stroke Patern population. -BCR-ABL  -Peripheral Flow cytometry   -CRP  -Repeat CBC  -Pending above, we will determine how frequently to monitor CBC    >50 min spent on preparation, face to face and in documentation     PAUL Gomez MD  THE [facility] Hematology [facility] Oncology [facility]

## 2021-12-29 ENCOUNTER — OFFICE VISIT (OUTPATIENT)
Dept: HEMATOLOGY/ONCOLOGY | Facility: HOSPITAL | Age: 51
End: 2021-12-29
Attending: INTERNAL MEDICINE
Payer: COMMERCIAL

## 2021-12-29 ENCOUNTER — TELEPHONE (OUTPATIENT)
Dept: HEMATOLOGY/ONCOLOGY | Facility: HOSPITAL | Age: 51
End: 2021-12-29

## 2021-12-29 ENCOUNTER — APPOINTMENT (OUTPATIENT)
Dept: HEMATOLOGY/ONCOLOGY | Age: 51
End: 2021-12-29
Attending: INTERNAL MEDICINE
Payer: COMMERCIAL

## 2021-12-29 VITALS
BODY MASS INDEX: 31.02 KG/M2 | RESPIRATION RATE: 18 BRPM | TEMPERATURE: 98 F | DIASTOLIC BLOOD PRESSURE: 87 MMHG | HEART RATE: 108 BPM | OXYGEN SATURATION: 98 % | HEIGHT: 67.17 IN | WEIGHT: 200 LBS | SYSTOLIC BLOOD PRESSURE: 137 MMHG

## 2021-12-29 DIAGNOSIS — D72.829 LEUKOCYTOSIS, UNSPECIFIED TYPE: Primary | ICD-10-CM

## 2021-12-29 PROCEDURE — 99244 OFF/OP CNSLTJ NEW/EST MOD 40: CPT | Performed by: INTERNAL MEDICINE

## 2021-12-29 NOTE — PROGRESS NOTES
Education Record    Learner:  Patient    Disease / Diagnosis:leukocytosis      Barriers / Limitations:  None   Comments:    Method:  Discussion   Comments:    General Topics:  Plan of care reviewed   Comments:    Outcome:  Shows understanding   Comments:

## 2021-12-29 NOTE — TELEPHONE ENCOUNTER
Spoke with patient regarding results. She verbalized understanding. Lab appointment scheduled. Love Valladares MD  P Edw Chiquita Danielle Rns  Results reviewed. CBC has improved. CRP is elevated suggesting inflammatory response.  Can she come back in 3 month

## 2021-12-30 ENCOUNTER — TELEPHONE (OUTPATIENT)
Dept: INTERNAL MEDICINE CLINIC | Facility: CLINIC | Age: 51
End: 2021-12-30

## 2022-01-05 LAB — BCR-ABL1, T(9;22) QUAL, RT-PCR: NOT DETECTED

## 2022-01-10 ENCOUNTER — APPOINTMENT (OUTPATIENT)
Dept: HEMATOLOGY/ONCOLOGY | Age: 52
End: 2022-01-10
Attending: INTERNAL MEDICINE
Payer: COMMERCIAL

## 2022-01-12 ENCOUNTER — TELEMEDICINE (OUTPATIENT)
Dept: INTERNAL MEDICINE CLINIC | Facility: CLINIC | Age: 52
End: 2022-01-12

## 2022-01-12 DIAGNOSIS — T78.40XA ALLERGY, INITIAL ENCOUNTER: ICD-10-CM

## 2022-01-12 DIAGNOSIS — J32.9 SINUSITIS, UNSPECIFIED CHRONICITY, UNSPECIFIED LOCATION: Primary | ICD-10-CM

## 2022-01-12 PROCEDURE — 99213 OFFICE O/P EST LOW 20 MIN: CPT | Performed by: INTERNAL MEDICINE

## 2022-01-12 RX ORDER — DOXYCYCLINE HYCLATE 100 MG/1
100 CAPSULE ORAL 2 TIMES DAILY
Qty: 10 CAPSULE | Refills: 0 | Status: SHIPPED | OUTPATIENT
Start: 2022-01-12 | End: 2022-01-17

## 2022-01-12 NOTE — PROGRESS NOTES
Subjective:   Ashlyn Sr is a 45year old female  who presents for Sinus Problem     Facial/sinus pressure pain with congestion for about 1 week. Denies f/c/cough/cp/sob. No other symptoms. Has not yet tried nasal sprays.    Taking anti-allergy pi negatives noted in the the HPI. Objective:   LMP 10/02/2018 (Approximate)  Estimated body mass index is 31.17 kg/m² as calculated from the following:    Height as of 12/29/21: 5' 7.17\" (1.706 m). Weight as of 12/29/21: 200 lb (90.7 kg).   PHYSICA

## 2022-01-14 ENCOUNTER — TELEPHONE (OUTPATIENT)
Dept: HEMATOLOGY/ONCOLOGY | Facility: HOSPITAL | Age: 52
End: 2022-01-14

## 2022-01-14 NOTE — TELEPHONE ENCOUNTER
Yanni Caballero called requesting a call back about labs done on 12/29/21 she had not heard from anyone on results.

## 2022-02-18 ENCOUNTER — TELEMEDICINE (OUTPATIENT)
Dept: INTERNAL MEDICINE CLINIC | Facility: CLINIC | Age: 52
End: 2022-02-18
Payer: COMMERCIAL

## 2022-02-18 DIAGNOSIS — K76.0 FATTY LIVER: ICD-10-CM

## 2022-02-18 DIAGNOSIS — E11.8 TYPE 2 DIABETES MELLITUS WITH COMPLICATION, WITHOUT LONG-TERM CURRENT USE OF INSULIN (HCC): ICD-10-CM

## 2022-02-18 DIAGNOSIS — E78.2 MIXED HYPERLIPIDEMIA: ICD-10-CM

## 2022-02-18 DIAGNOSIS — Z51.81 THERAPEUTIC DRUG MONITORING: Primary | ICD-10-CM

## 2022-02-18 DIAGNOSIS — E66.9 OBESITY (BMI 30-39.9): ICD-10-CM

## 2022-02-18 PROCEDURE — 99213 OFFICE O/P EST LOW 20 MIN: CPT | Performed by: NURSE PRACTITIONER

## 2022-02-18 RX ORDER — PHENTERMINE HYDROCHLORIDE 30 MG/1
30 CAPSULE ORAL EVERY MORNING
Qty: 30 CAPSULE | Refills: 2 | Status: SHIPPED | OUTPATIENT
Start: 2022-02-18

## 2022-02-18 NOTE — PATIENT INSTRUCTIONS
We are here to support you with weight loss, but please remember that you still need your primary care provider for your routine health maintenance. PLAN:  Will continue with phentermine and metformin  Please send in some BPs at home over the next couple of days   Follow up with me in 8-10 weeks  Schedule follow up appointments: Angel Lujan (dietitian) or Arpit Estrada (presurgery dietitian)   Check for insurance coverage for dietitian and labwork prior to scheduling appointment. Please try to work on the following dietary changes:  1. Goals: Aim for 20-30 grams of protein/ meal  i. Aim for  100 grams of carbohydrates/day  ii. Eat 4-6 vegetables/day  iii. Avoid skipping meals- eat every 4-5 hours  iv. Aim for 3 meals/day  2. Drink lots of water and cut down on soda/juice consumption if soda/juice drinker  3. Focus on protein: (15-30 grams with each meal) ie. greek yogurt, cottage cheese, string cheese, hard boiled eggs  4. Healthy snacks: peanut butter and apples, hummus and carrots, berries, nuts (1/4 cup), tuna and crackers                 Protein Shakes: Premier protein or Core Power                Protein Bars: Rx Bars, Oatmega, Power Crunch                 Sargento balanced breaks (cheese and nuts)- without chocolate  5. Reduce carbohydrates which includes sweets as well as rice, pasta, potatoes, bread, corn and instead choose whole grain options or more protein or vegetables (4-6 servings of vegetables per day)  6. Get a good night of sleep  7. Try to decrease stress in life     Please download apps:  1. \"My Fitness Pal\" (other option is Lose it)) to help you to monitor daily dietary intake and you will be able to see if you are eating the right amount of calories, protein, carbs                With My Fitness Pal-->When you set-up the rocky or need to adjust settings:                Goals should include:                  Lose 1.5-2 lbs per week                Activity level: not very active (can't count exercise towards calorie number per day)                   ** Daily INPUT> Look at nutrition section-- \"nutrients\" and it will break down your macros for the day (ie. Protein, carbs, fibers, sugars and fats). Try to stay within these numbers daily     2. \"7 minute workout\" to help with exercise/activity which takes 7 minutes of your day and that you can do at home! 3. \"Calm\" or \"Headspace\" which helps with mindfulness, meditation, clarity, sleep, and riddhi to your daily life. 4. [employer] blog for healthy recipe ideas  5. [employer] for low carb resources    HIGH PROTEIN SNACK IDEAS  -cottage cheese  -plain yogurt  -kefir  -hard-boiled eggs  -natural cheeses  -nuts (measure portion size)   -unsweetened nut butters  -dried edamame   -mj seeds soaked in water or almond milk  -soy nuts  -cured meats (monitor for sodium issues)   -hummus with vegetables  -bean dip with vegetables     FRUIT  Low carb fruit options   Raspberries: Half a cup (60 grams) contains 3 grams of carbs. Blackberries: Half a cup (70 grams) contains 4 grams of carbs. Strawberries: Half a cup (100 grams) contains 6 grams of carbs. Blueberries: Half a cup (50 grams) contains 6 grams of carbs. Plum: One medium-sized (80 grams) contains 6 grams of carbs.      VEGETABLES  Low carb vegetables

## 2022-03-29 ENCOUNTER — NURSE ONLY (OUTPATIENT)
Dept: HEMATOLOGY/ONCOLOGY | Facility: HOSPITAL | Age: 52
End: 2022-03-29
Attending: INTERNAL MEDICINE
Payer: COMMERCIAL

## 2022-03-29 DIAGNOSIS — D72.829 LEUKOCYTOSIS, UNSPECIFIED TYPE: ICD-10-CM

## 2022-03-29 DIAGNOSIS — R76.8 POSITIVE ANA (ANTINUCLEAR ANTIBODY): Primary | ICD-10-CM

## 2022-03-29 LAB
BASOPHILS # BLD AUTO: 0.14 X10(3) UL (ref 0–0.2)
BASOPHILS NFR BLD AUTO: 1.1 %
EOSINOPHIL # BLD AUTO: 0.24 X10(3) UL (ref 0–0.7)
EOSINOPHIL NFR BLD AUTO: 1.9 %
ERYTHROCYTE [DISTWIDTH] IN BLOOD BY AUTOMATED COUNT: 13.3 %
HCT VFR BLD AUTO: 41 %
HGB BLD-MCNC: 13.3 G/DL
IMM GRANULOCYTES # BLD AUTO: 0.06 X10(3) UL (ref 0–1)
IMM GRANULOCYTES NFR BLD: 0.5 %
LYMPHOCYTES # BLD AUTO: 4.29 X10(3) UL (ref 1–4)
LYMPHOCYTES NFR BLD AUTO: 33.7 %
MCH RBC QN AUTO: 30.4 PG (ref 26–34)
MCHC RBC AUTO-ENTMCNC: 32.4 G/DL (ref 31–37)
MCV RBC AUTO: 93.6 FL
MONOCYTES # BLD AUTO: 0.83 X10(3) UL (ref 0.1–1)
MONOCYTES NFR BLD AUTO: 6.5 %
NEUTROPHILS # BLD AUTO: 7.16 X10 (3) UL (ref 1.5–7.7)
NEUTROPHILS # BLD AUTO: 7.16 X10(3) UL (ref 1.5–7.7)
NEUTROPHILS NFR BLD AUTO: 56.3 %
PLATELET # BLD AUTO: 315 10(3)UL (ref 150–450)
RBC # BLD AUTO: 4.38 X10(6)UL
WBC # BLD AUTO: 12.7 X10(3) UL (ref 4–11)

## 2022-03-29 PROCEDURE — 85025 COMPLETE CBC W/AUTO DIFF WBC: CPT

## 2022-03-29 PROCEDURE — 36415 COLL VENOUS BLD VENIPUNCTURE: CPT

## 2022-03-29 PROCEDURE — 88185 FLOWCYTOMETRY/TC ADD-ON: CPT

## 2022-03-29 PROCEDURE — 88184 FLOWCYTOMETRY/ TC 1 MARKER: CPT

## 2022-04-01 LAB
CD10 CELLS NFR SPEC: 1 %
CD10 CELLS NFR SPEC: 1 %
CD11C CELLS NFR SPEC: 8 %
CD11C CELLS NFR SPEC: 8 %
CD14 CELLS NFR SPEC: <1 %
CD14 CELLS NFR SPEC: <1 %
CD19 CELLS NFR SPEC: 12 %
CD19 CELLS NFR SPEC: 12 %
CD19/CD10 CELLS: <1 %
CD19/CD10 CELLS: <1 %
CD20 CELLS NFR SPEC: 17 %
CD20 CELLS NFR SPEC: 17 %
CD22 CELLS NFR SPEC: 17 %
CD23 CELLS NFR SPEC: 1 %
CD3 CELLS NFR SPEC: 69 %
CD3 CELLS NFR SPEC: 69 %
CD3+CD4+ CELLS NFR SPEC: 47 %
CD3+CD4+ CELLS/CD3+CD8+ CLL SPEC: 2.4
CD3+CD8+ CELLS NFR SPEC: 20 %
CD33 CELLS NFR SPEC: 4 %
CD33/CD34 CELLS: 4 %
CD34 CELLS NFR SPEC: 10 %
CD45 CELLS NFR SPEC: 100 %
CD5 CELLS NFR SPEC: 69 %
CD5 CELLS NFR SPEC: 69 %
CD5/CD19 CELLS: 1 %
CD56 CELLS NFR SPEC: 16 %
CD7 CELLS NFR SPEC: 70 %
CD7 CELLS NFR SPEC: 70 %
CELL SURF KAPPA/LAMBDA RATIO: 1.3
CELL SURF KAPPA/LAMBDA RATIO: 1.3
CELL SURF LAMBDA LIGHT CHAIN: 7 %
CELL SURF LAMBDA LIGHT CHAIN: 7 %
CELL SURFACE KAPPA LIGHT CHAIN: 9 %
CELL SURFACE KAPPA LIGHT CHAIN: 9 %
FMC7 CELLS NFR SPEC: 11 %

## 2022-04-08 NOTE — TELEPHONE ENCOUNTER
Detailed VM left for patient   Patient due for DM follow up     Protocol passed     Requesting: Metformin 500mg     LOV: 10/22/21   RTC: none noted   Filled: 12/16/21 #180 0 refills   Recent Labs: 12/14/21    Upcoming OV: none scheduled

## 2022-04-16 ENCOUNTER — HOSPITAL ENCOUNTER (EMERGENCY)
Age: 52
Discharge: HOME | End: 2022-04-16
Attending: EMERGENCY MEDICINE
Payer: COMMERCIAL

## 2022-04-16 ENCOUNTER — APPOINTMENT (OUTPATIENT)
Dept: GENERAL RADIOLOGY | Age: 52
End: 2022-04-16
Payer: COMMERCIAL

## 2022-04-16 VITALS
WEIGHT: 195 LBS | OXYGEN SATURATION: 97 % | SYSTOLIC BLOOD PRESSURE: 117 MMHG | RESPIRATION RATE: 15 BRPM | HEIGHT: 67 IN | BODY MASS INDEX: 30.61 KG/M2 | DIASTOLIC BLOOD PRESSURE: 68 MMHG | TEMPERATURE: 99 F | HEART RATE: 80 BPM

## 2022-04-16 DIAGNOSIS — T50.5X5A EXPOSURE TO PHENTERMINE, INITIAL ENCOUNTER: ICD-10-CM

## 2022-04-16 DIAGNOSIS — R00.0 TACHYCARDIA: ICD-10-CM

## 2022-04-16 DIAGNOSIS — E86.0 DEHYDRATION: Primary | ICD-10-CM

## 2022-04-16 LAB
ALBUMIN SERPL-MCNC: 3.7 G/DL (ref 3.4–5)
ALBUMIN/GLOB SERPL: 1 {RATIO} (ref 1–2)
ALP LIVER SERPL-CCNC: 83 U/L
ALT SERPL-CCNC: 32 U/L
ANION GAP SERPL CALC-SCNC: 8 MMOL/L (ref 0–18)
AST SERPL-CCNC: 13 U/L (ref 15–37)
BASOPHILS # BLD AUTO: 0.11 X10(3) UL (ref 0–0.2)
BASOPHILS NFR BLD AUTO: 0.9 %
BILIRUB SERPL-MCNC: 0.2 MG/DL (ref 0.1–2)
BUN BLD-MCNC: 15 MG/DL (ref 7–18)
CALCIUM BLD-MCNC: 9.6 MG/DL (ref 8.5–10.1)
CHLORIDE SERPL-SCNC: 104 MMOL/L (ref 98–112)
CO2 SERPL-SCNC: 25 MMOL/L (ref 21–32)
CREAT BLD-MCNC: 0.9 MG/DL
EOSINOPHIL # BLD AUTO: 0.1 X10(3) UL (ref 0–0.7)
EOSINOPHIL NFR BLD AUTO: 0.8 %
ERYTHROCYTE [DISTWIDTH] IN BLOOD BY AUTOMATED COUNT: 13.2 %
GLOBULIN PLAS-MCNC: 3.7 G/DL (ref 2.8–4.4)
GLUCOSE BLD-MCNC: 147 MG/DL (ref 70–99)
HCT VFR BLD AUTO: 41.3 %
HGB BLD-MCNC: 13.1 G/DL
IMM GRANULOCYTES # BLD AUTO: 0.04 X10(3) UL (ref 0–1)
IMM GRANULOCYTES NFR BLD: 0.3 %
LYMPHOCYTES # BLD AUTO: 3.72 X10(3) UL (ref 1–4)
LYMPHOCYTES NFR BLD AUTO: 31.3 %
MCH RBC QN AUTO: 29.6 PG (ref 26–34)
MCHC RBC AUTO-ENTMCNC: 31.7 G/DL (ref 31–37)
MCV RBC AUTO: 93.2 FL
MONOCYTES # BLD AUTO: 0.77 X10(3) UL (ref 0.1–1)
MONOCYTES NFR BLD AUTO: 6.5 %
NEUTROPHILS # BLD AUTO: 7.15 X10 (3) UL (ref 1.5–7.7)
NEUTROPHILS # BLD AUTO: 7.15 X10(3) UL (ref 1.5–7.7)
NEUTROPHILS NFR BLD AUTO: 60.2 %
OSMOLALITY SERPL CALC.SUM OF ELEC: 288 MOSM/KG (ref 275–295)
PLATELET # BLD AUTO: 354 10(3)UL (ref 150–450)
POTASSIUM SERPL-SCNC: 3.9 MMOL/L (ref 3.5–5.1)
PROT SERPL-MCNC: 7.4 G/DL (ref 6.4–8.2)
RBC # BLD AUTO: 4.43 X10(6)UL
SODIUM SERPL-SCNC: 137 MMOL/L (ref 136–145)
TROPONIN I HIGH SENSITIVITY: <3 NG/L
WBC # BLD AUTO: 11.9 X10(3) UL (ref 4–11)

## 2022-04-16 PROCEDURE — 93005 ELECTROCARDIOGRAM TRACING: CPT

## 2022-04-16 PROCEDURE — 80053 COMPREHEN METABOLIC PANEL: CPT | Performed by: EMERGENCY MEDICINE

## 2022-04-16 PROCEDURE — 99285 EMERGENCY DEPT VISIT HI MDM: CPT

## 2022-04-16 PROCEDURE — 96360 HYDRATION IV INFUSION INIT: CPT

## 2022-04-16 PROCEDURE — 71045 X-RAY EXAM CHEST 1 VIEW: CPT | Performed by: EMERGENCY MEDICINE

## 2022-04-16 PROCEDURE — 85025 COMPLETE CBC W/AUTO DIFF WBC: CPT | Performed by: EMERGENCY MEDICINE

## 2022-04-16 PROCEDURE — 93010 ELECTROCARDIOGRAM REPORT: CPT

## 2022-04-16 PROCEDURE — 84484 ASSAY OF TROPONIN QUANT: CPT | Performed by: EMERGENCY MEDICINE

## 2022-04-18 LAB
ATRIAL RATE: 96 BPM
P AXIS: 57 DEGREES
P-R INTERVAL: 182 MS
Q-T INTERVAL: 366 MS
QRS DURATION: 80 MS
QTC CALCULATION (BEZET): 462 MS
R AXIS: -10 DEGREES
T AXIS: 44 DEGREES
VENTRICULAR RATE: 96 BPM

## 2022-05-02 ENCOUNTER — LAB ENCOUNTER (OUTPATIENT)
Dept: LAB | Age: 52
End: 2022-05-02
Attending: INTERNAL MEDICINE
Payer: COMMERCIAL

## 2022-05-02 DIAGNOSIS — Z01.818 PRE-OP TESTING: ICD-10-CM

## 2022-05-03 LAB — SARS-COV-2 RNA RESP QL NAA+PROBE: NOT DETECTED

## 2022-05-05 PROBLEM — K64.8 INTERNAL HEMORRHOIDS: Status: ACTIVE | Noted: 2022-05-05

## 2022-05-05 PROBLEM — K64.4 EXTERNAL HEMORRHOIDS WITHOUT COMPLICATION: Status: ACTIVE | Noted: 2022-05-05

## 2022-05-05 PROBLEM — Z12.11 ENCOUNTER FOR SCREENING COLONOSCOPY: Status: ACTIVE | Noted: 2022-05-05

## 2022-06-20 ENCOUNTER — HOSPITAL ENCOUNTER (OUTPATIENT)
Dept: CT IMAGING | Age: 52
Discharge: HOME | End: 2022-06-20
Attending: OTOLARYNGOLOGY
Payer: COMMERCIAL

## 2022-06-20 DIAGNOSIS — J34.89 NASAL OBSTRUCTION: ICD-10-CM

## 2022-06-20 PROCEDURE — 70486 CT MAXILLOFACIAL W/O DYE: CPT | Performed by: OTOLARYNGOLOGY

## 2022-06-25 ENCOUNTER — HOSPITAL ENCOUNTER (OUTPATIENT)
Age: 52
Discharge: HOME | End: 2022-06-25
Payer: COMMERCIAL

## 2022-06-25 VITALS
SYSTOLIC BLOOD PRESSURE: 136 MMHG | TEMPERATURE: 98 F | DIASTOLIC BLOOD PRESSURE: 84 MMHG | HEART RATE: 99 BPM | OXYGEN SATURATION: 97 % | RESPIRATION RATE: 16 BRPM

## 2022-06-25 DIAGNOSIS — J32.9 SINUSITIS, UNSPECIFIED CHRONICITY, UNSPECIFIED LOCATION: Primary | ICD-10-CM

## 2022-06-25 PROCEDURE — 99213 OFFICE O/P EST LOW 20 MIN: CPT | Performed by: NURSE PRACTITIONER

## 2022-06-25 RX ORDER — AZITHROMYCIN 250 MG/1
TABLET, FILM COATED ORAL
Qty: 6 TABLET | Refills: 0 | Status: SHIPPED | OUTPATIENT
Start: 2022-06-25 | End: 2022-06-30

## 2022-06-25 RX ORDER — PREDNISONE 20 MG/1
40 TABLET ORAL DAILY
Qty: 10 TABLET | Refills: 0 | Status: SHIPPED | OUTPATIENT
Start: 2022-06-25 | End: 2022-06-30

## 2022-06-28 ENCOUNTER — OFFICE VISIT (OUTPATIENT)
Dept: INTERNAL MEDICINE CLINIC | Facility: CLINIC | Age: 52
End: 2022-06-28
Payer: COMMERCIAL

## 2022-06-28 VITALS
OXYGEN SATURATION: 97 % | HEART RATE: 102 BPM | WEIGHT: 205 LBS | BODY MASS INDEX: 32.18 KG/M2 | RESPIRATION RATE: 16 BRPM | HEIGHT: 67 IN | SYSTOLIC BLOOD PRESSURE: 138 MMHG | DIASTOLIC BLOOD PRESSURE: 84 MMHG

## 2022-06-28 DIAGNOSIS — Z51.81 THERAPEUTIC DRUG MONITORING: Primary | ICD-10-CM

## 2022-06-28 DIAGNOSIS — E66.9 OBESITY (BMI 30-39.9): ICD-10-CM

## 2022-06-28 DIAGNOSIS — E11.8 TYPE 2 DIABETES MELLITUS WITH COMPLICATION, WITHOUT LONG-TERM CURRENT USE OF INSULIN (HCC): ICD-10-CM

## 2022-06-28 DIAGNOSIS — E78.2 MIXED HYPERLIPIDEMIA: ICD-10-CM

## 2022-06-28 DIAGNOSIS — K76.0 FATTY LIVER: ICD-10-CM

## 2022-06-28 PROCEDURE — 3075F SYST BP GE 130 - 139MM HG: CPT | Performed by: NURSE PRACTITIONER

## 2022-06-28 PROCEDURE — 3079F DIAST BP 80-89 MM HG: CPT | Performed by: NURSE PRACTITIONER

## 2022-06-28 PROCEDURE — 99214 OFFICE O/P EST MOD 30 MIN: CPT | Performed by: NURSE PRACTITIONER

## 2022-06-28 PROCEDURE — 3008F BODY MASS INDEX DOCD: CPT | Performed by: NURSE PRACTITIONER

## 2022-06-28 RX ORDER — ORAL SEMAGLUTIDE 3 MG/1
3 TABLET ORAL DAILY
Qty: 30 TABLET | Refills: 0 | COMMUNITY
Start: 2022-06-28 | End: 2022-07-28

## 2022-06-28 RX ORDER — MONTELUKAST SODIUM 10 MG/1
10 TABLET ORAL NIGHTLY
COMMUNITY
Start: 2022-06-15

## 2022-06-28 NOTE — PATIENT INSTRUCTIONS
We are here to support you with weight loss, but please remember that you still need your primary care provider for your routine health maintenance. PLAN:  Will stop phentermine  Will continue with metformin  Will trial medication: rybelsus 3mg daily make sure to take on an empty stomach and wait 30 minutes before eating or drinking anything  Follow up with me in 4 weeks  Schedule follow up appointments: Deb Huddleston (dietitian) or Mitchel Michael (presurgery dietitian)   Check for insurance coverage for dietitian and labwork prior to scheduling appointment. Please try to work on the following dietary changes:  1. Goals: Aim for 20-30 grams of protein/ meal  i. Aim for 110 grams of carbohydrates/day  ii. Eat 4-6 vegetables/day  iii. Avoid skipping meals- eat every 4-5 hours  iv. Aim for 3 meals/day  2. Drink lots of water and cut down on soda/juice consumption if soda/juice drinker  3. Focus on protein: (15-30 grams with each meal) ie. greek yogurt, cottage cheese, string cheese, hard boiled eggs  4. Healthy snacks: peanut butter and apples, hummus and carrots, berries, nuts (1/4 cup), tuna and crackers                 Protein Shakes: Premier protein or Core Power                Protein Bars: Rx Bars, Oatmega, Power Crunch                 S[employer] balanced breaks (cheese and nuts)- without chocolate  5. Reduce carbohydrates which includes sweets as well as rice, pasta, potatoes, bread, corn and instead choose whole grain options or more protein or vegetables (4-6 servings of vegetables per day)  6. Get a good night of sleep  7. Try to decrease stress in life     Please download apps:  1. \"My Fitness Pal\" (other option is Lose it)) to help you to monitor daily dietary intake and you will be able to see if you are eating the right amount of calories, protein, carbs                With My Fitness Pal-->When you set-up the rocky or need to adjust settings:                Goals should include:                  Lose 1.5-2 lbs per week                Activity level: not very active (can't count exercise towards calorie number per day)                   ** Daily INPUT> Look at nutrition section-- \"nutrients\" and it will break down your macros for the day (ie. Protein, carbs, fibers, sugars and fats). Try to stay within these numbers daily     2. \"7 minute workout\" to help with exercise/activity which takes 7 minutes of your day and that you can do at home! 3. \"Calm\" or \"Headspace\" which helps with mindfulness, meditation, clarity, sleep, and riddhi to your daily life. 4. [employer] blog for healthy recipe ideas  5. [employer] for low carb resources    HIGH PROTEIN SNACK IDEAS  -cottage cheese  -plain yogurt  -kefir  -hard-boiled eggs  -natural cheeses  -nuts (measure portion size)   -unsweetened nut butters  -dried edamame   -mj seeds soaked in water or almond milk  -soy nuts  -cured meats (monitor for sodium issues)   -hummus with vegetables  -bean dip with vegetables     FRUIT  Low carb fruit options   Raspberries: Half a cup (60 grams) contains 3 grams of carbs. Blackberries: Half a cup (70 grams) contains 4 grams of carbs. Strawberries: Half a cup (100 grams) contains 6 grams of carbs. Blueberries: Half a cup (50 grams) contains 6 grams of carbs. Plum: One medium-sized (80 grams) contains 6 grams of carbs.      VEGETABLES  Low carb vegetables

## 2022-06-29 ENCOUNTER — APPOINTMENT (OUTPATIENT)
Dept: CT IMAGING | Age: 52
End: 2022-06-29
Attending: EMERGENCY MEDICINE
Payer: COMMERCIAL

## 2022-06-29 ENCOUNTER — HOSPITAL ENCOUNTER (EMERGENCY)
Age: 52
Discharge: HOME | End: 2022-06-29
Attending: EMERGENCY MEDICINE
Payer: COMMERCIAL

## 2022-06-29 VITALS
HEART RATE: 72 BPM | RESPIRATION RATE: 18 BRPM | WEIGHT: 195 LBS | BODY MASS INDEX: 30.61 KG/M2 | SYSTOLIC BLOOD PRESSURE: 149 MMHG | HEIGHT: 67 IN | TEMPERATURE: 99 F | DIASTOLIC BLOOD PRESSURE: 89 MMHG | OXYGEN SATURATION: 99 %

## 2022-06-29 DIAGNOSIS — R06.89 BREATHING DIFFICULT: Primary | ICD-10-CM

## 2022-06-29 PROCEDURE — 99284 EMERGENCY DEPT VISIT MOD MDM: CPT

## 2022-06-29 PROCEDURE — 70486 CT MAXILLOFACIAL W/O DYE: CPT | Performed by: EMERGENCY MEDICINE

## 2022-06-29 NOTE — ED INITIAL ASSESSMENT (HPI)
Worsening nasal congestion. Prescribed steroids Saturday at [facility]. Also taking OTC allergy medications.

## 2022-07-11 ENCOUNTER — TELEPHONE (OUTPATIENT)
Dept: INTERNAL MEDICINE CLINIC | Facility: CLINIC | Age: 52
End: 2022-07-11

## 2022-07-11 NOTE — TELEPHONE ENCOUNTER
Pt last seen by SV on 7/6   LVMTCB to discuss her questions   Per SV patient can also schedule a VV to discuss medications

## 2022-07-11 NOTE — TELEPHONE ENCOUNTER
Pt had a recent visit with Dr. Yolis Oates and would like a call back to discuss being prescribed two new medications.

## 2022-07-12 NOTE — TELEPHONE ENCOUNTER
Pt called back and stated her call was in regards to getting 2 prescriptions sent for Diflucan. Pt stated she has been on abx for a while and she is starting to experience symptoms of a yeast infection however did not elaborate.      Pt stated she always needs two prescriptions for Diflucan    Please advise

## 2022-07-13 ENCOUNTER — PATIENT MESSAGE (OUTPATIENT)
Dept: INTERNAL MEDICINE CLINIC | Facility: CLINIC | Age: 52
End: 2022-07-13

## 2022-07-13 RX ORDER — FLUCONAZOLE 150 MG/1
150 TABLET ORAL ONCE
Qty: 2 TABLET | Refills: 0 | Status: SHIPPED | OUTPATIENT
Start: 2022-07-13 | End: 2022-07-13

## 2022-07-13 NOTE — TELEPHONE ENCOUNTER
2 tablets of diflucan sent to pharmacy.  Take one pill today and take the 2nd tablet 3 days later only if symptoms persist

## 2022-07-14 NOTE — TELEPHONE ENCOUNTER
Requesting Rybelsus increase  LOV: 6/28/22  RTC: 7 weeks  Last Relevant Labs: 10/22/21  Filled: 6/28/22 #30 with 0 refills  rybelsus 3 mg    Future Appointments   Date Time Provider Jennifer Serrato   8/16/2022  2:00 PM DENISE Aguilar EMGEFEI EM[facility] [address]

## 2022-07-15 RX ORDER — ORAL SEMAGLUTIDE 7 MG/1
7 TABLET ORAL DAILY
Qty: 30 TABLET | Refills: 0 | Status: SHIPPED | OUTPATIENT
Start: 2022-07-15 | End: 2022-08-14

## 2022-07-22 ENCOUNTER — TELEPHONE (OUTPATIENT)
Dept: HEMATOLOGY/ONCOLOGY | Facility: HOSPITAL | Age: 52
End: 2022-07-22

## 2022-07-22 NOTE — TELEPHONE ENCOUNTER
Patient called and she is having surgery on August 26, 2022. When should the patient see the patient for blood work and an appointment with Dr. Deborah Butler. Please call Belle Spurling at [phone]. Thank you.

## 2022-08-01 ENCOUNTER — PATIENT MESSAGE (OUTPATIENT)
Dept: INTERNAL MEDICINE CLINIC | Facility: CLINIC | Age: 52
End: 2022-08-01

## 2022-08-03 ENCOUNTER — OFFICE VISIT (OUTPATIENT)
Dept: HEMATOLOGY/ONCOLOGY | Facility: HOSPITAL | Age: 52
End: 2022-08-03
Attending: INTERNAL MEDICINE
Payer: COMMERCIAL

## 2022-08-03 VITALS
RESPIRATION RATE: 18 BRPM | DIASTOLIC BLOOD PRESSURE: 75 MMHG | WEIGHT: 205.38 LBS | OXYGEN SATURATION: 97 % | TEMPERATURE: 98 F | SYSTOLIC BLOOD PRESSURE: 130 MMHG | HEIGHT: 67.01 IN | HEART RATE: 74 BPM | BODY MASS INDEX: 32.24 KG/M2

## 2022-08-03 DIAGNOSIS — D72.829 LEUKOCYTOSIS, UNSPECIFIED TYPE: Primary | ICD-10-CM

## 2022-08-03 LAB
BASOPHILS # BLD AUTO: 0.1 X10(3) UL (ref 0–0.2)
BASOPHILS NFR BLD AUTO: 0.8 %
CRP SERPL-MCNC: 0.85 MG/DL (ref ?–0.3)
EOSINOPHIL # BLD AUTO: 0.18 X10(3) UL (ref 0–0.7)
EOSINOPHIL NFR BLD AUTO: 1.4 %
ERYTHROCYTE [DISTWIDTH] IN BLOOD BY AUTOMATED COUNT: 13.4 %
HCT VFR BLD AUTO: 39.3 %
HGB BLD-MCNC: 12.6 G/DL
IMM GRANULOCYTES # BLD AUTO: 0.04 X10(3) UL (ref 0–1)
IMM GRANULOCYTES NFR BLD: 0.3 %
LYMPHOCYTES # BLD AUTO: 3.87 X10(3) UL (ref 1–4)
LYMPHOCYTES NFR BLD AUTO: 29.5 %
MCH RBC QN AUTO: 30.1 PG (ref 26–34)
MCHC RBC AUTO-ENTMCNC: 32.1 G/DL (ref 31–37)
MCV RBC AUTO: 94 FL
MONOCYTES # BLD AUTO: 0.76 X10(3) UL (ref 0.1–1)
MONOCYTES NFR BLD AUTO: 5.8 %
NEUTROPHILS # BLD AUTO: 8.18 X10 (3) UL (ref 1.5–7.7)
NEUTROPHILS # BLD AUTO: 8.18 X10(3) UL (ref 1.5–7.7)
NEUTROPHILS NFR BLD AUTO: 62.2 %
PLATELET # BLD AUTO: 344 10(3)UL (ref 150–450)
RBC # BLD AUTO: 4.18 X10(6)UL
WBC # BLD AUTO: 13.1 X10(3) UL (ref 4–11)

## 2022-08-03 PROCEDURE — 99213 OFFICE O/P EST LOW 20 MIN: CPT | Performed by: INTERNAL MEDICINE

## 2022-08-03 NOTE — PROGRESS NOTES
Education Record    Learner:  Patient    Disease / Diagnosis:leukocytosis     Barriers / Limitations:  None   Comments:    Method:  Discussion   Comments:    General Topics:  Plan of care reviewed   Comments:    Outcome:  Shows understanding   Comments:    Patient here for follow-up. States energy levels are still poor. Still has occasional chills throughout the day. Denies any fevers or nausea. Upcoming sinus procedure on 8/25/22.

## 2022-08-10 ENCOUNTER — TELEPHONE (OUTPATIENT)
Dept: INTERNAL MEDICINE CLINIC | Facility: CLINIC | Age: 52
End: 2022-08-10

## 2022-08-10 ENCOUNTER — LAB ENCOUNTER (OUTPATIENT)
Dept: LAB | Age: 52
End: 2022-08-10
Attending: INTERNAL MEDICINE
Payer: COMMERCIAL

## 2022-08-10 ENCOUNTER — OFFICE VISIT (OUTPATIENT)
Dept: INTERNAL MEDICINE CLINIC | Facility: CLINIC | Age: 52
End: 2022-08-10
Payer: COMMERCIAL

## 2022-08-10 VITALS
SYSTOLIC BLOOD PRESSURE: 112 MMHG | DIASTOLIC BLOOD PRESSURE: 80 MMHG | HEART RATE: 90 BPM | OXYGEN SATURATION: 99 % | HEIGHT: 67 IN | WEIGHT: 205.19 LBS | TEMPERATURE: 98 F | BODY MASS INDEX: 32.2 KG/M2 | RESPIRATION RATE: 16 BRPM

## 2022-08-10 DIAGNOSIS — E11.8 TYPE 2 DIABETES MELLITUS WITH COMPLICATION, WITHOUT LONG-TERM CURRENT USE OF INSULIN (HCC): ICD-10-CM

## 2022-08-10 DIAGNOSIS — Z01.810 PREOPERATIVE CARDIOVASCULAR EXAMINATION: Primary | ICD-10-CM

## 2022-08-10 DIAGNOSIS — E78.00 PURE HYPERCHOLESTEROLEMIA: ICD-10-CM

## 2022-08-10 DIAGNOSIS — E07.9 THYROID DYSFUNCTION: ICD-10-CM

## 2022-08-10 DIAGNOSIS — J32.9 SINUSITIS, UNSPECIFIED CHRONICITY, UNSPECIFIED LOCATION: ICD-10-CM

## 2022-08-10 LAB
ANION GAP SERPL CALC-SCNC: 3 MMOL/L (ref 0–18)
BUN BLD-MCNC: 13 MG/DL (ref 7–18)
CALCIUM BLD-MCNC: 9.2 MG/DL (ref 8.5–10.1)
CHLORIDE SERPL-SCNC: 105 MMOL/L (ref 98–112)
CHOLEST SERPL-MCNC: 263 MG/DL (ref ?–200)
CO2 SERPL-SCNC: 29 MMOL/L (ref 21–32)
CREAT BLD-MCNC: 0.87 MG/DL
CREAT UR-SCNC: 213 MG/DL
EST. AVERAGE GLUCOSE BLD GHB EST-MCNC: 134 MG/DL (ref 68–126)
FASTING PATIENT LIPID ANSWER: YES
FASTING STATUS PATIENT QL REPORTED: YES
GFR SERPLBLD BASED ON 1.73 SQ M-ARVRAT: 80 ML/MIN/1.73M2 (ref 60–?)
GLUCOSE BLD-MCNC: 109 MG/DL (ref 70–99)
HBA1C MFR BLD: 6.3 % (ref ?–5.7)
HDLC SERPL-MCNC: 46 MG/DL (ref 40–59)
LDLC SERPL CALC-MCNC: 188 MG/DL (ref ?–100)
MICROALBUMIN UR-MCNC: 1.09 MG/DL
MICROALBUMIN/CREAT 24H UR-RTO: 5.1 UG/MG (ref ?–30)
NONHDLC SERPL-MCNC: 217 MG/DL (ref ?–130)
OSMOLALITY SERPL CALC.SUM OF ELEC: 285 MOSM/KG (ref 275–295)
POTASSIUM SERPL-SCNC: 4 MMOL/L (ref 3.5–5.1)
SODIUM SERPL-SCNC: 137 MMOL/L (ref 136–145)
TRIGL SERPL-MCNC: 156 MG/DL (ref 30–149)
TSI SER-ACNC: 1.28 MIU/ML (ref 0.36–3.74)
VLDLC SERPL CALC-MCNC: 33 MG/DL (ref 0–30)

## 2022-08-10 PROCEDURE — 82570 ASSAY OF URINE CREATININE: CPT | Performed by: INTERNAL MEDICINE

## 2022-08-10 PROCEDURE — 3061F NEG MICROALBUMINURIA REV: CPT | Performed by: NURSE PRACTITIONER

## 2022-08-10 PROCEDURE — 83036 HEMOGLOBIN GLYCOSYLATED A1C: CPT | Performed by: INTERNAL MEDICINE

## 2022-08-10 PROCEDURE — 3079F DIAST BP 80-89 MM HG: CPT | Performed by: INTERNAL MEDICINE

## 2022-08-10 PROCEDURE — 93000 ELECTROCARDIOGRAM COMPLETE: CPT | Performed by: INTERNAL MEDICINE

## 2022-08-10 PROCEDURE — 80061 LIPID PANEL: CPT | Performed by: INTERNAL MEDICINE

## 2022-08-10 PROCEDURE — 3008F BODY MASS INDEX DOCD: CPT | Performed by: INTERNAL MEDICINE

## 2022-08-10 PROCEDURE — 80048 BASIC METABOLIC PNL TOTAL CA: CPT | Performed by: INTERNAL MEDICINE

## 2022-08-10 PROCEDURE — 3044F HG A1C LEVEL LT 7.0%: CPT | Performed by: NURSE PRACTITIONER

## 2022-08-10 PROCEDURE — 99214 OFFICE O/P EST MOD 30 MIN: CPT | Performed by: INTERNAL MEDICINE

## 2022-08-10 PROCEDURE — 84443 ASSAY THYROID STIM HORMONE: CPT | Performed by: INTERNAL MEDICINE

## 2022-08-10 PROCEDURE — 82043 UR ALBUMIN QUANTITATIVE: CPT | Performed by: INTERNAL MEDICINE

## 2022-08-10 PROCEDURE — 3074F SYST BP LT 130 MM HG: CPT | Performed by: INTERNAL MEDICINE

## 2022-08-10 RX ORDER — FLUCONAZOLE 150 MG/1
TABLET ORAL
COMMUNITY
Start: 2022-07-13 | End: 2022-08-10 | Stop reason: ALTCHOICE

## 2022-08-16 ENCOUNTER — OFFICE VISIT (OUTPATIENT)
Dept: INTERNAL MEDICINE CLINIC | Facility: CLINIC | Age: 52
End: 2022-08-16
Payer: COMMERCIAL

## 2022-08-16 ENCOUNTER — TELEPHONE (OUTPATIENT)
Dept: INTERNAL MEDICINE CLINIC | Facility: CLINIC | Age: 52
End: 2022-08-16

## 2022-08-16 VITALS
HEIGHT: 67 IN | BODY MASS INDEX: 32.18 KG/M2 | RESPIRATION RATE: 16 BRPM | DIASTOLIC BLOOD PRESSURE: 84 MMHG | WEIGHT: 205 LBS | SYSTOLIC BLOOD PRESSURE: 124 MMHG | HEART RATE: 82 BPM | OXYGEN SATURATION: 96 %

## 2022-08-16 DIAGNOSIS — K76.0 FATTY LIVER: ICD-10-CM

## 2022-08-16 DIAGNOSIS — F43.9 STRESS: ICD-10-CM

## 2022-08-16 DIAGNOSIS — E78.00 PURE HYPERCHOLESTEROLEMIA: ICD-10-CM

## 2022-08-16 DIAGNOSIS — Z51.81 THERAPEUTIC DRUG MONITORING: Primary | ICD-10-CM

## 2022-08-16 DIAGNOSIS — E66.9 OBESITY (BMI 30-39.9): ICD-10-CM

## 2022-08-16 DIAGNOSIS — E11.8 TYPE 2 DIABETES MELLITUS WITH COMPLICATION, WITHOUT LONG-TERM CURRENT USE OF INSULIN (HCC): ICD-10-CM

## 2022-08-16 PROCEDURE — 3074F SYST BP LT 130 MM HG: CPT | Performed by: NURSE PRACTITIONER

## 2022-08-16 PROCEDURE — 99214 OFFICE O/P EST MOD 30 MIN: CPT | Performed by: NURSE PRACTITIONER

## 2022-08-16 PROCEDURE — 3079F DIAST BP 80-89 MM HG: CPT | Performed by: NURSE PRACTITIONER

## 2022-08-16 PROCEDURE — 3008F BODY MASS INDEX DOCD: CPT | Performed by: NURSE PRACTITIONER

## 2022-08-16 RX ORDER — ORAL SEMAGLUTIDE 7 MG/1
7 TABLET ORAL DAILY
Qty: 30 TABLET | Refills: 1 | Status: SHIPPED | OUTPATIENT
Start: 2022-08-30 | End: 2022-09-29

## 2022-08-16 NOTE — PATIENT INSTRUCTIONS
We are here to support you with weight loss, but please remember that you still need your primary care provider for your routine health maintenance. PLAN:  Will continue with rybelsus 3mg for a couple more weeks and then increase to 7mg daily   See behavioral psych- Jennifer De. Follow up with me in 8-12 weeks  Schedule follow up appointments: Shawnee De La O (dietitian) or Edenilson Kinsey (presurgery dietitian)   Check for insurance coverage for dietitian and labwork prior to scheduling appointment. Please try to work on the following dietary changes:  1. Goals: Aim for 20-30 grams of protein/ meal  i. Aim for 100 grams of carbohydrates/day  ii. Eat 4-6 vegetables/day  iii. Avoid skipping meals- eat every 4-5 hours  iv. Aim for 3 meals/day  2. Drink lots of water and cut down on soda/juice consumption if soda/juice drinker  3. Focus on protein: (15-30 grams with each meal) ie. greek yogurt, cottage cheese, string cheese, hard boiled eggs  4. Healthy snacks: peanut butter and apples, hummus and carrots, berries, nuts (1/4 cup), tuna and crackers                 Protein Shakes: Premier protein or Core Power                Protein Bars: Rx Bars, Oatmega, Power Crunch                 S[employer] balanced breaks (cheese and nuts)- without chocolate  5. Reduce carbohydrates which includes sweets as well as rice, pasta, potatoes, bread, corn and instead choose whole grain options or more protein or vegetables (4-6 servings of vegetables per day)  6. Get a good night of sleep  7. Try to decrease stress in life     Please download apps:  1. \"My Fitness Pal\" (other option is Lose it)) to help you to monitor daily dietary intake and you will be able to see if you are eating the right amount of calories, protein, carbs                With My Fitness Pal-->When you set-up the rocky or need to adjust settings:                Goals should include:                  Lose 1.5-2 lbs per week                Activity level: not very active (can't count exercise towards calorie number per day)                   ** Daily INPUT> Look at nutrition section-- \"nutrients\" and it will break down your macros for the day (ie. Protein, carbs, fibers, sugars and fats). Try to stay within these numbers daily     2. \"7 minute workout\" to help with exercise/activity which takes 7 minutes of your day and that you can do at home! 3. \"Calm\" or \"Headspace\" which helps with mindfulness, meditation, clarity, sleep, and riddhi to your daily life. 4. [employer] blog for healthy recipe ideas  5. [employer] for low carb resources    HIGH PROTEIN SNACK IDEAS  -cottage cheese  -plain yogurt  -kefir  -hard-boiled eggs  -natural cheeses  -nuts (measure portion size)   -unsweetened nut butters  -dried edamame   -mj seeds soaked in water or almond milk  -soy nuts  -cured meats (monitor for sodium issues)   -hummus with vegetables  -bean dip with vegetables     FRUIT  Low carb fruit options   Raspberries: Half a cup (60 grams) contains 3 grams of carbs. Blackberries: Half a cup (70 grams) contains 4 grams of carbs. Strawberries: Half a cup (100 grams) contains 6 grams of carbs. Blueberries: Half a cup (50 grams) contains 6 grams of carbs. Plum: One medium-sized (80 grams) contains 6 grams of carbs.      VEGETABLES  Low carb vegetables

## 2022-08-16 NOTE — TELEPHONE ENCOUNTER
Faxed H&P, labs, and EKG to Dr. Salcedo Horse office   Confirmation received   EKG sent to scan and copy placed in accordion

## 2022-08-25 RX ORDER — ORAL SEMAGLUTIDE 7 MG/1
TABLET ORAL
Qty: 30 TABLET | Refills: 0 | OUTPATIENT
Start: 2022-08-25

## 2022-10-04 ENCOUNTER — HOSPITAL ENCOUNTER (OUTPATIENT)
Dept: ULTRASOUND IMAGING | Age: 52
Discharge: HOME | End: 2022-10-04
Attending: OTOLARYNGOLOGY
Payer: COMMERCIAL

## 2022-10-04 DIAGNOSIS — E07.9 THYROID DYSFUNCTION: ICD-10-CM

## 2022-10-04 PROCEDURE — 76536 US EXAM OF HEAD AND NECK: CPT | Performed by: OTOLARYNGOLOGY

## 2022-10-05 ENCOUNTER — APPOINTMENT (OUTPATIENT)
Dept: HEMATOLOGY/ONCOLOGY | Facility: HOSPITAL | Age: 52
End: 2022-10-05
Attending: INTERNAL MEDICINE
Payer: COMMERCIAL

## 2022-10-20 DIAGNOSIS — E11.8 TYPE 2 DIABETES MELLITUS WITH COMPLICATION, WITHOUT LONG-TERM CURRENT USE OF INSULIN (HCC): ICD-10-CM

## 2022-10-20 DIAGNOSIS — E66.9 OBESITY (BMI 30-39.9): ICD-10-CM

## 2022-10-20 DIAGNOSIS — Z51.81 THERAPEUTIC DRUG MONITORING: ICD-10-CM

## 2022-10-20 NOTE — TELEPHONE ENCOUNTER
Requesting Damaso   LOV: 8/16/22  RTC: 8-12 weeks  Last Relevant Labs: 8/10/22  Filled: 8/30/22 #30 with 1 refills    Future Appointments   Date Time Provider Jennifer Serrato   10/28/2022 10:30 AM Giovanni Justin, PhD [facility]   10/28/2022 11:40 AM DENISE Cruz EMGWEI [facility] [address]   11/16/2022  2:00 PM Josh Seaman RD EMGWE[facility] [address]

## 2022-10-23 RX ORDER — ORAL SEMAGLUTIDE 7 MG/1
TABLET ORAL
Qty: 30 TABLET | Refills: 1 | OUTPATIENT
Start: 2022-10-23

## 2022-10-28 ENCOUNTER — TELEMEDICINE (OUTPATIENT)
Dept: INTERNAL MEDICINE CLINIC | Facility: CLINIC | Age: 52
End: 2022-10-28
Payer: COMMERCIAL

## 2022-10-28 DIAGNOSIS — K76.0 FATTY LIVER: ICD-10-CM

## 2022-10-28 DIAGNOSIS — F41.9 ANXIETY: ICD-10-CM

## 2022-10-28 DIAGNOSIS — Z51.81 THERAPEUTIC DRUG MONITORING: Primary | ICD-10-CM

## 2022-10-28 DIAGNOSIS — E11.8 TYPE 2 DIABETES MELLITUS WITH COMPLICATION, WITHOUT LONG-TERM CURRENT USE OF INSULIN (HCC): ICD-10-CM

## 2022-10-28 DIAGNOSIS — F43.9 STRESS: ICD-10-CM

## 2022-10-28 DIAGNOSIS — E66.9 OBESITY (BMI 30-39.9): ICD-10-CM

## 2022-10-28 DIAGNOSIS — E78.00 PURE HYPERCHOLESTEROLEMIA: ICD-10-CM

## 2022-10-28 DIAGNOSIS — R73.03 PREDIABETES: ICD-10-CM

## 2022-10-28 PROCEDURE — 99213 OFFICE O/P EST LOW 20 MIN: CPT | Performed by: NURSE PRACTITIONER

## 2022-10-28 RX ORDER — ORAL SEMAGLUTIDE 7 MG/1
7 TABLET ORAL DAILY
Qty: 30 TABLET | Refills: 1 | Status: SHIPPED | OUTPATIENT
Start: 2022-10-28 | End: 2022-11-27

## 2022-10-28 NOTE — PATIENT INSTRUCTIONS
Next steps:  1. Fill your prescribed medication and take as discussed and prescribed: rybelsus 7mg daily   2. Schedule a personal nutrition consultation with one of our registered dieticians  3. Get a1c repeated after nov. 10, 2022     Please try to work on the following dietary changes:     1. Drink water with meals and throughout the day, cut down on soda and/or juice if consumed. Consider flavored water options like Bubbly, Spindrift, Hint and Francisco J. 2.  Eat breakfast daily and focus on having protein with each meal, examples include: greek yogurt, cottage cheese, hard boiled egg, whole grain toast with peanut butter. 3.  Reduce refined carbohydrates and sugars which includes items such as sweets, as well as rice, pasta, and bread and make sure to choose whole grain options when having them with just 1 serving per meal about the size of your inner palm. 4.  Consume non starchy veggies daily working towards making them a good 50% of your daily food intake. Add them to lunch and dinner consistently. 5.  Start a daily probiotic: VSL#3 is recommended, (order on line at www.vsl3. com). Take 1 capsule daily with water for 30 days, then reduce to 1 every other day (this will reduce the cost). Capsules can be left out for 2 weeks, but then must be refrigerated. Please download rocky My Fitness Gunjan Holder! Or Net Diary to monitor daily dietary intake and you will be able to see if you are eating the right amount of calories or too much or too little which would hinder weight loss. Additionally this will help to see your daily carbohydrate and protein intake. When you set the rocky up choose 1-2 lbs/week as a goal.  Keeping a paper food journal is an option as well to remain accountable for your choices- this is the start to mindful eating! A low calorie diet has been consistently shown to support weight loss. Continue or start exercising to help establish a routine.  If not already exercising begin with 1 day and progress as able with long-term goal of 30 minutes 5 days a week at a minimum. Meditation daily can help manage and control stress. Chronic stress can make weight loss difficult. Exercising is one way to help with stress, but meditation using the CALM Sara or another comparable alternative can be done in your home or place of work with little time commitment. This Sara can also help work on behavior change and improve sleep. Check out the segment under Calm Masterclass and listen to The 4 Pillars of Health. A great way to begin learning about the foundation of lifestyle with practical tips to use in your every day. Check out www.yourweightmatters. org blog for continued daily support and education along this weight loss journey! Patient Resources:     Personal Training/Fitness Classes/Health Coaching     Kristine Hopkins and Johnson Sophiaside @ [email or web address] Full fitness center with group fitness and personal training. Discount available as client of [facility]. Health Coaching and Personal Training with Isabela Diaz at our [facility]- individual weekly coaching with option to add personal training and small group fitness classes targeted at weight loss- [phone] and/or email @ [email or web address]  360FIT [place] [email or web address] Group Fitness [phone] and/or email Sushma Bailon at [email or web address]  [address] [facility] with multiple locations: Aetna (www.Opposing Views. uVore), Eat The Snatch that Jerky Fitness (www.ScanSafe. uVore), Fit Body Bootcamp (www.4s91.combodybootcamp.uVore), InterResolve Fitness (www.Ikaria. uVore), The Exercise  (www.exercisecoach.com)     Online Fitness  Fitness  on Whole Foods in 10 DVD series- www. vhtnd39NAG. uVore  Sit and Be Fit - Chair exercise series Www.sitandbefit. org  Hip Hop Fit with Eladio Price at www.hiphopfit. net     Apps for on the Go Fitness  BioCurity 7 Minute Workout (orange box with white 7) - free on the go HIIT training sara  Peloton Asra @ www. "Hackster, Inc."     Nutrition Trackers and Tools  LoseIT! And My Fitness Pal apps and on line for tracking nutrition  NOOM - virtual health coaching  FitFoundation (healthy meals on the go) in [facility] @ www. pkszyifkkwiwz0l. Cayden Engle MD @ www.RoboCVtromdKongZhong and Frank Briggs (keto and low carb plans recommended) @ www. MWVOME04.YQI, Metabolic Meals @ www. Welkin HealthMetabolicMeals. com - individual prepared meals to go  Encap, iGlue, International [employer] Machines, Every Plate, Pick1- on line meal delivery programs for preparation at home  AK BizeeBee in [place] for homemade meals to go @ wwwStARTinitiative. Idea Device  Diet Doctor @ www. dietdoctor. Idea Device - low carb swaps  Koemei - meal prep and planning sara (www.yummly. com)     Stress Management/Behavior/Mindful Eating  CALM meditation sara (www.Intelligent Apps (mytaxi))  Headspace  Am I Hungry? Mindful eating virtual  sara  Www.yourweightmatters. org - Obesity Action Coalition sponsored Blog posts daily  Motivation sara (black box with white \")- daily supportive messages sent to your phone     Books/Video Education/Podcasts  Mindless Eating by Yogi العراقي  Why We Get Sick by Arminda Richter (a book about insulin resistance)  Atomic Habits by Kym Saxena (a book about taking small steps to promote greater behavior change)   Can't Hurt Me by Clyde You (a book exploring the power of discipline in achieving your goals)  The End of Dieting: How to Live for Life by Dr. Dot Swain M.D. or listen to The [facility] Episode 61: Understanding \"Nutritarian\" Eating w/Dr. Dot Swain  Your Body in Balance: The World [employer] of Food, Hormones, and Health by Dr. Nahomy Zimmer  The Menopause Diet Plan by Brittni Valera and [facility] HOSP AT [facility]  The Complete Guide to fasting by Dr. Alli Simmons, [address] [facility] by Aguila Mccauley, Ph.D, R.D.   Weight Loss Surgery Will Not Treat Food Addiction by Vero Ríos Ph.D  The Rachel Starr on plant based nutrition  Fed Up - documentary about obesity (Free on [place])  The Truth About Sugar - documentary on sugar (Free on Utube, https://youtu. be/0P7bpnjWR1a)  The Dr. Ketty Olivas by Dr. Adriana Kaiser MD  Fitlosophy Fitspiration - journal to better health (found at [employer] in fitness aisle)  What Happened to You?- a look at the impact trauma has on behavior written by Reuben Palomino and Dr. Mell Wise Again by Harvey Paez - discovering your true self after trauma  Seema Caballero talk on ParkTAG Social Parking, The Call to Courage  Podcasts: The Exam Room by the Physician's Committee, Nutrition Facts by Dr. Gabbie Tena    We are here to support you with weight loss, but please remember that you still need your primary care provider for your routine health maintenance.

## 2022-11-08 ENCOUNTER — OFFICE VISIT (OUTPATIENT)
Dept: INTERNAL MEDICINE CLINIC | Facility: CLINIC | Age: 52
End: 2022-11-08
Payer: COMMERCIAL

## 2022-11-08 VITALS
SYSTOLIC BLOOD PRESSURE: 122 MMHG | HEIGHT: 67 IN | TEMPERATURE: 98 F | BODY MASS INDEX: 32.46 KG/M2 | WEIGHT: 206.81 LBS | HEART RATE: 84 BPM | DIASTOLIC BLOOD PRESSURE: 85 MMHG | RESPIRATION RATE: 16 BRPM | OXYGEN SATURATION: 99 %

## 2022-11-08 DIAGNOSIS — G89.29 CHRONIC BILATERAL LOW BACK PAIN WITHOUT SCIATICA: Primary | ICD-10-CM

## 2022-11-08 DIAGNOSIS — M54.50 CHRONIC BILATERAL LOW BACK PAIN WITHOUT SCIATICA: Primary | ICD-10-CM

## 2022-11-08 DIAGNOSIS — M54.2 NECK PAIN: ICD-10-CM

## 2022-11-08 PROCEDURE — 99213 OFFICE O/P EST LOW 20 MIN: CPT | Performed by: INTERNAL MEDICINE

## 2022-11-08 PROCEDURE — 3008F BODY MASS INDEX DOCD: CPT | Performed by: INTERNAL MEDICINE

## 2022-11-08 PROCEDURE — 3074F SYST BP LT 130 MM HG: CPT | Performed by: INTERNAL MEDICINE

## 2022-11-08 PROCEDURE — 3079F DIAST BP 80-89 MM HG: CPT | Performed by: INTERNAL MEDICINE

## 2022-11-09 ENCOUNTER — TELEPHONE (OUTPATIENT)
Dept: INTERNAL MEDICINE CLINIC | Facility: CLINIC | Age: 52
End: 2022-11-09

## 2022-11-09 DIAGNOSIS — E11.8 TYPE 2 DIABETES MELLITUS WITH COMPLICATION, WITHOUT LONG-TERM CURRENT USE OF INSULIN (HCC): ICD-10-CM

## 2022-11-09 DIAGNOSIS — E78.00 PURE HYPERCHOLESTEROLEMIA: ICD-10-CM

## 2022-11-09 DIAGNOSIS — E66.9 OBESITY (BMI 30-39.9): Primary | ICD-10-CM

## 2022-11-09 DIAGNOSIS — K76.0 FATTY LIVER: ICD-10-CM

## 2022-11-09 NOTE — TELEPHONE ENCOUNTER
Dari Izquierdo, RN  Kan Chapin,     Can you please enter a Dietitian referral for patient as she is seeing Kaylah Arevalo next Wednesday. Reminder to include any cardiovascular risk factor dx in addition to obesity dx. Thanks.    done

## 2022-11-11 ENCOUNTER — LAB ENCOUNTER (OUTPATIENT)
Dept: LAB | Age: 52
End: 2022-11-11
Attending: NURSE PRACTITIONER
Payer: COMMERCIAL

## 2022-11-11 DIAGNOSIS — R73.03 PREDIABETES: ICD-10-CM

## 2022-11-11 DIAGNOSIS — E66.9 OBESITY (BMI 30-39.9): ICD-10-CM

## 2022-11-11 DIAGNOSIS — Z51.81 THERAPEUTIC DRUG MONITORING: ICD-10-CM

## 2022-11-11 LAB
EST. AVERAGE GLUCOSE BLD GHB EST-MCNC: 126 MG/DL (ref 68–126)
HBA1C MFR BLD: 6 % (ref ?–5.7)

## 2022-11-11 PROCEDURE — 3044F HG A1C LEVEL LT 7.0%: CPT | Performed by: DIETITIAN, REGISTERED

## 2022-11-11 PROCEDURE — 83036 HEMOGLOBIN GLYCOSYLATED A1C: CPT

## 2022-11-11 PROCEDURE — 36415 COLL VENOUS BLD VENIPUNCTURE: CPT

## 2022-11-14 RX ORDER — ORAL SEMAGLUTIDE 14 MG/1
14 TABLET ORAL DAILY
Qty: 90 TABLET | Refills: 0 | Status: SHIPPED | OUTPATIENT
Start: 2022-11-14

## 2022-11-14 NOTE — TELEPHONE ENCOUNTER
Requesting Rybelsus increase  LOV: 10/28/22  RTC: not noted  Last Relevant Labs: 11/11/22  Filled: 10/28/22 #30 with 1 refills      See her note - I pended #90 of 14 mg Rybelsus

## 2022-11-16 ENCOUNTER — OFFICE VISIT (OUTPATIENT)
Dept: INTERNAL MEDICINE CLINIC | Facility: CLINIC | Age: 52
End: 2022-11-16
Payer: COMMERCIAL

## 2022-11-16 DIAGNOSIS — E66.9 CLASS 1 OBESITY WITHOUT SERIOUS COMORBIDITY WITH BODY MASS INDEX (BMI) OF 32.0 TO 32.9 IN ADULT, UNSPECIFIED OBESITY TYPE: ICD-10-CM

## 2022-11-16 DIAGNOSIS — K76.0 FATTY LIVER: ICD-10-CM

## 2022-11-16 DIAGNOSIS — E11.8 TYPE 2 DIABETES MELLITUS WITH COMPLICATION, WITHOUT LONG-TERM CURRENT USE OF INSULIN (HCC): ICD-10-CM

## 2022-11-16 PROCEDURE — 97802 MEDICAL NUTRITION INDIV IN: CPT | Performed by: DIETITIAN, REGISTERED

## 2022-11-22 ENCOUNTER — TELEPHONE (OUTPATIENT)
Dept: INTERNAL MEDICINE CLINIC | Facility: CLINIC | Age: 52
End: 2022-11-22

## 2022-11-22 NOTE — TELEPHONE ENCOUNTER
Incoming fax from [facility] for plan of care 11/21/22. Placed in [address] in basket for review and signature.

## 2022-12-07 ENCOUNTER — TELEPHONE (OUTPATIENT)
Dept: INTERNAL MEDICINE CLINIC | Facility: CLINIC | Age: 52
End: 2022-12-07

## 2022-12-07 NOTE — TELEPHONE ENCOUNTER
Pt called stating she tested positive for covid today. Pt has a scratchy throat, congestion, and SOB. Pt states last year LS stated she may have lukemia and referred her to a hematologist.     Pt would like to know if she can get some recommendations prior to her appt, she is unsure if it is ok for her to wait until then for treatment , she would like to know what LS recommends at this time.      Future Appointments   Date Time Provider Jennifer Serrato   12/9/2022  1:30 PM [facility]

## 2022-12-08 RX ORDER — NIRMATRELVIR AND RITONAVIR 300-100 MG
KIT ORAL
Qty: 30 TABLET | Refills: 0 | Status: SHIPPED | OUTPATIENT
Start: 2022-12-08 | End: 2022-12-13

## 2022-12-08 NOTE — TELEPHONE ENCOUNTER
Call patient - She was NOT diagnosed with leukemia. Her white blood cell count was elevated and the hematologist Dr. Lenin Ruelas listed a diagnosis of leukocytosis which is the medical word for elevated white blood cell count. No further testing or imaging needed for this, just routine monitoring of blood work. In regards to the COVID infection, if she is having severe symptoms, she could start Paxlovid antiviral medication. It can have side effects including nausea/vomiting and metallic taste. Paxlovid sent to pharmacy on file, [facility] Arevalo in [place]. Will see her for video visit as scheduled tomorrow.

## 2022-12-08 NOTE — TELEPHONE ENCOUNTER
Spoke to pt, provided all the information from Dr. Juju Gupta below. Pt stated she is picking up the Paxlovid - answered some questions for the pt. Pt will keep her VV with Dr. Juju Gupta so they can discuss how she is tolerating the medication and how her symptoms are doing.

## 2022-12-09 ENCOUNTER — TELEMEDICINE (OUTPATIENT)
Dept: INTERNAL MEDICINE CLINIC | Facility: CLINIC | Age: 52
End: 2022-12-09
Payer: COMMERCIAL

## 2022-12-09 DIAGNOSIS — U07.1 COVID-19 VIRUS INFECTION: Primary | ICD-10-CM

## 2022-12-09 PROCEDURE — 99213 OFFICE O/P EST LOW 20 MIN: CPT | Performed by: INTERNAL MEDICINE

## 2022-12-13 ENCOUNTER — TELEPHONE (OUTPATIENT)
Dept: INTERNAL MEDICINE CLINIC | Facility: CLINIC | Age: 52
End: 2022-12-13

## 2022-12-13 NOTE — TELEPHONE ENCOUNTER
Patient had an appt with Dr. Waldo Palacios, she didn't think she would need it but she needs the letter to get her COVID pay. Patient needs a letter stating she was off with COVID on 12/07/2022.

## 2022-12-14 ENCOUNTER — PATIENT MESSAGE (OUTPATIENT)
Dept: INTERNAL MEDICINE CLINIC | Facility: CLINIC | Age: 52
End: 2022-12-14

## 2022-12-23 ENCOUNTER — TELEPHONE (OUTPATIENT)
Dept: INTERNAL MEDICINE CLINIC | Facility: CLINIC | Age: 52
End: 2022-12-23

## 2022-12-23 RX ORDER — AZITHROMYCIN 250 MG/1
TABLET, FILM COATED ORAL
Qty: 6 TABLET | Refills: 0 | Status: SHIPPED | OUTPATIENT
Start: 2022-12-23 | End: 2022-12-28

## 2022-12-23 NOTE — TELEPHONE ENCOUNTER
She has been \"over\" covid infection for \"awhile\" but has lingering sinus pressure symptoms that have worsened over the past week. She has pressure at the eyebrow area. She started flonase this week and xyzal. She started sinus rinses. She took sudafed and advil today. Will start abx and prescribe Z pack since she has tolerated in the past.     Allergy to pcn and erythromycin.

## 2022-12-30 ENCOUNTER — TELEPHONE (OUTPATIENT)
Dept: INTERNAL MEDICINE CLINIC | Facility: CLINIC | Age: 52
End: 2022-12-30

## 2022-12-30 NOTE — TELEPHONE ENCOUNTER
[facility] PHYSICAL THERAPY DISCHARGE SUMMARY- 12/29/2022 placed in Dr. Lakia Wang in-box.

## 2023-02-14 NOTE — TELEPHONE ENCOUNTER
Now scheduled  Future Appointments   Date Time Provider Jennifer Serrato   2/21/2023  4:00 PM DENISE Hillman [facility]

## 2023-02-14 NOTE — TELEPHONE ENCOUNTER
Patient called to ask for refill - has one day left    Requesting Rybelsus  LOV: 10/28/22  RTC: not noted  Last Relevant Labs: 11/11/22  Filled: 11/14/22 #90 with 0 refills    No future appointments.

## 2023-02-15 RX ORDER — ORAL SEMAGLUTIDE 14 MG/1
14 TABLET ORAL DAILY
Qty: 30 TABLET | Refills: 0 | Status: SHIPPED | OUTPATIENT
Start: 2023-02-15

## 2023-02-20 NOTE — LETTER
05[birth year]        Ngozi Mathew  [address] [place] [address]      Dear Ngozi,    Our records indicate that you have outstanding lab work and or testing that was ordered for you and has not yet been completed:  Orders Placed This Encounter      Lipid Panel      CBC With Differential With Platelet      Comp Metabolic Panel (14)      Hemoglobin A1C      Microalb/Creat Ratio, Random Urine    To provide you with the best possible care, please complete these orders at your earliest convenience. If you have recently completed these orders please disregard this letter.     If you have any questions please call the office at Dept: [phone].     Thank you,       Ann Vidal MD         Spontaneous, unlabored and symmetrical

## 2023-03-07 ENCOUNTER — TELEPHONE (OUTPATIENT)
Dept: INTERNAL MEDICINE CLINIC | Facility: CLINIC | Age: 53
End: 2023-03-07

## 2023-03-07 DIAGNOSIS — Z23 NEED FOR SHINGLES VACCINE: ICD-10-CM

## 2023-03-07 DIAGNOSIS — R82.90 ABNORMAL URINALYSIS: Primary | ICD-10-CM

## 2023-03-07 DIAGNOSIS — E11.8 TYPE 2 DIABETES MELLITUS WITH COMPLICATION, WITHOUT LONG-TERM CURRENT USE OF INSULIN (HCC): Primary | ICD-10-CM

## 2023-04-06 ENCOUNTER — TELEPHONE (OUTPATIENT)
Dept: INTERNAL MEDICINE CLINIC | Facility: CLINIC | Age: 53
End: 2023-04-06

## 2023-04-06 DIAGNOSIS — E07.9 THYROID DYSFUNCTION: Primary | ICD-10-CM

## 2023-04-06 NOTE — TELEPHONE ENCOUNTER
Pt c/o feeling very fatigue and is wondering if she is due to have her thyroid checked, please advise.

## 2023-04-06 NOTE — TELEPHONE ENCOUNTER
JAMES - LOV w/you was 8/10/22, saw SV on 11/8/22 for back pain. Would you like to order TSH or see pt in office first? Please advise, ty!

## 2023-04-07 NOTE — TELEPHONE ENCOUNTER
[phone] left detailed vm that TSH lab ordered by LS, pt does not need to fast, can complete when convenient for her. If pt has any questions, please call office back Monday 4/10/23, left cb#.

## 2023-04-07 NOTE — TELEPHONE ENCOUNTER
Last check was about 5 and a half months ago - reasonable to check it at 6 month time point so I placed an order in the system; can get anytime

## 2023-04-19 ENCOUNTER — OFFICE VISIT (OUTPATIENT)
Dept: INTERNAL MEDICINE CLINIC | Facility: CLINIC | Age: 53
End: 2023-04-19
Payer: COMMERCIAL

## 2023-04-19 VITALS
OXYGEN SATURATION: 95 % | SYSTOLIC BLOOD PRESSURE: 114 MMHG | HEART RATE: 83 BPM | DIASTOLIC BLOOD PRESSURE: 70 MMHG | WEIGHT: 204 LBS | BODY MASS INDEX: 32.02 KG/M2 | RESPIRATION RATE: 16 BRPM | HEIGHT: 67 IN

## 2023-04-19 DIAGNOSIS — E78.00 PURE HYPERCHOLESTEROLEMIA: ICD-10-CM

## 2023-04-19 DIAGNOSIS — R73.03 PREDIABETES: ICD-10-CM

## 2023-04-19 DIAGNOSIS — M25.551 HIP PAIN, CHRONIC, RIGHT: ICD-10-CM

## 2023-04-19 DIAGNOSIS — E66.9 OBESITY (BMI 30-39.9): ICD-10-CM

## 2023-04-19 DIAGNOSIS — F41.9 ANXIETY: ICD-10-CM

## 2023-04-19 DIAGNOSIS — K76.0 FATTY LIVER: ICD-10-CM

## 2023-04-19 DIAGNOSIS — Z51.81 THERAPEUTIC DRUG MONITORING: Primary | ICD-10-CM

## 2023-04-19 DIAGNOSIS — F43.9 STRESS: ICD-10-CM

## 2023-04-19 DIAGNOSIS — G89.29 HIP PAIN, CHRONIC, RIGHT: ICD-10-CM

## 2023-04-19 PROCEDURE — 3008F BODY MASS INDEX DOCD: CPT | Performed by: NURSE PRACTITIONER

## 2023-04-19 PROCEDURE — 3078F DIAST BP <80 MM HG: CPT | Performed by: NURSE PRACTITIONER

## 2023-04-19 PROCEDURE — 99214 OFFICE O/P EST MOD 30 MIN: CPT | Performed by: NURSE PRACTITIONER

## 2023-04-19 PROCEDURE — 3074F SYST BP LT 130 MM HG: CPT | Performed by: NURSE PRACTITIONER

## 2023-04-19 RX ORDER — SEMAGLUTIDE 0.5 MG/.5ML
0.5 INJECTION, SOLUTION SUBCUTANEOUS WEEKLY
Qty: 2 ML | Refills: 0 | Status: SHIPPED | OUTPATIENT
Start: 2023-04-19 | End: 2023-05-11

## 2023-04-19 RX ORDER — ESTROGENS, CONJUGATED 0.45 MG/1
TABLET, FILM COATED ORAL
COMMUNITY
Start: 2023-03-12

## 2023-04-19 NOTE — PATIENT INSTRUCTIONS
Next steps:  1. Fill your prescribed medication and take as discussed and prescribed:   Stop the rybelsus   Trial medication: wegovy 0.5mg weekly x 4 weeks and the increase to 1mg weekly   2. Schedule a personal nutrition consultation with one of our registered dieticians     Please try to work on the following dietary changes:  Daily protein recommendation to start:  grams  Daily carbohydrate: <120g  Daily calories: 1,300-1,400  1. Drink water with meals and throughout the day, cut down on soda and/or juice if consumed. Consider flavored water options like Bubbly, Spindrift, Hint and Francisco J. 2.  Eat breakfast daily and focus on having protein with each meal, examples include: greek yogurt, cottage cheese, hard boiled egg, whole grain toast with peanut butter. 3.  Reduce refined carbohydrates and sugars which includes items such as sweets, as well as rice, pasta, and bread and make sure to choose whole grain options when having them with just 1 serving per meal about the size of your inner palm. 4.  Consume non starchy veggies daily working towards making them a good 50% of your daily food intake. Add them to lunch and dinner consistently. 5.  Start a daily probiotic: VSL#3 is recommended, (order on line at www.vsl3. com). Take 1 capsule daily with water for 30 days, then reduce to 1 every other day (this will reduce the cost). Capsules can be left out for 2 weeks, but then must be refrigerated. Please download rocky My Fitness Merrill Primus! Or Net Diary to monitor daily dietary intake and you will be able to see if you are eating the right amount of calories or too much or too little which would hinder weight loss. Additionally this will help to see your daily carbohydrate and protein intake. When you set the rocky up choose 1-2 lbs/week as a goal.  Keeping a paper food journal is an option as well to remain accountable for your choices- this is the start to mindful eating!  A low calorie diet has been consistently shown to support weight loss. Continue or start exercising to help establish a routine. If not already exercising begin with 1 day and progress as able with long-term goal of 30 minutes 5 days a week at a minimum. Meditation daily can help manage and control stress. Chronic stress can make weight loss difficult. Exercising is one way to help with stress, but meditation using the CALM Sara or another comparable alternative can be done in your home or place of work with little time commitment. This Sara can also help work on behavior change and improve sleep. Check out the segment under Calm Masterclass and listen to The 4 Pillars of Health. A great way to begin learning about the foundation of lifestyle with practical tips to use in your every day. Check out www.yourweightmatters. org blog for continued daily support and education along this weight loss journey! Patient Resources:     Personal Training/Fitness Classes/Health Coaching     Maxton JEREMIE AUGUST and Alex Scalesmariel @ [email or web address]/ Full fitness center with group fitness and personal training. Discount available as client of [facility]. Health Coaching and Personal Training with Brenda Sargent at our [employer]- individual weekly coaching with option to add personal training and small group fitness classes targeted at weight loss- [phone] and/or email @ Su Williamson. [email or web address]  360FIT [place] [email or web address] Group Fitness [phone] and/or email Arely Priest at PancraThe Beer CafÃ©@"nCrowd, Inc.". Freight Connection  [address] [place] [address] with multiple locations: Aetna (www.Parudi. Freight Connection), Eat The Frog Fitness (www.PixelFish. Freight Connection), Fit Body Bootcamp (www.Aires PharmaceuticalsbodybootDarwin Marketingp.Freight Connection), Manta Media (www.HireVue), The Exercise  (www.exercisecoach.Freight Connection)     Online Fitness  Fitness  on Whole Foods in 10 DVD series- www.hwvtq91QUE. 365net  Sit and Be Fit - Chair exercise series Www.sitandbefit. org  Hip Hop Fit with Eladio Price at www.hiphopfit. net     Apps for on the Kiwiple 7 Minute Workout (orange box with white 7) - free on the go HIIT training sara  Peloton Sara @ wwwSergeMD     Nutrition Trackers and Tools  LoseIT! And My Fitness Pal apps and on line for tracking nutrition  NOOM - virtual health coaching  FitFoundation (healthy meals on the go) in [facility] @ www. darlvvazjeybl3f. Pricila Anna MD @ www.Contextbrokerd.com and Alex Wells (keto and low carb plans recommended) @ www. MAJZVV81.[facility], Metabolic Meals @ www. RoomoramaabolicMeals. com - individual prepared meals to go  Sanrad, Woppa, [employer]ines, Every Plate, HomeAway- on line meal delivery programs for preparation at home  AK Lysanda in [place] for homemade meals to go @ www.mealGrokkerage. 365net  Diet Doctor @ www. dietdoctor. 365net - low carb swaps  YuLiveHealthier - meal prep and planning sara (www.yummly. com)     Stress Management/Behavior/Mindful Eating  CALM meditation sara (www.calmQuad/Graphics)  Headspace  Am I Hungry? Mindful eating virtual  sara  Www.yourweightmatters. org - Obesity Action Coalition sponsored Blog posts daily  Motivation sara (black box with white \")- daily supportive messages sent to your phone     Books/Video Education/Podcasts  Mindless Eating by Charlee Zambrano  Why We Get Sick by Karolina Posada (a book about insulin resistance)  Atomic Habits by Karen Rouse (a book about taking small steps to promote greater behavior change)   Can't Hurt Me by Miguel Ángel Larsen (a book exploring the power of discipline in achieving your goals)  The End of Dieting: How to Live for Life by Dr. Edgar Mclain M.D. or listen to The [address] Episode 61: Understanding \"Nutritarian\" Eating w/Dr. Edgar Mclain  Your Body in Balance:  The World [employer] of Food, Hormones, and Health by Dr. Kim Hem  The Menopause Diet Plan by Corinne Bernal and [facility]OSP AT [facility]  The Complete Guide to fasting by Dr. Onel Hoover, [address] [facility] by Nolberto Pickens, Ph.D, R.D. Weight Loss Surgery Will Not Treat Food Addiction by Latisha Borja Ph.D  The [address] [place] [address] on plant based nutrition  Fed Up - documentary about obesity (Free on [facility])  The Truth About Sugar - documentary on sugar (Free on Utube, https://youtu. be/0J5icllYG6f)  The Dr. Mag Swanson by Dr. Narinder Hannon MD  Fitlosophy Fitspiration - journal to better health (found at [employer] in fitness aisle)  What Happened to You?- a look at the impact trauma has on behavior written by Oralia Fry and Dr. Raelyn Kanner Again by Brittney Low - discovering your true self after trauma  Martinez Quispe talk on Fastback Networks, The Call to Courage  Podcasts: The Exam Room by the Physician's Committee, Nutrition Facts by Dr. Carlo Sampson    We are here to support you with weight loss, but please remember that you still need your primary care provider for your routine health maintenance.

## 2023-05-09 RX ORDER — SEMAGLUTIDE 0.5 MG/.5ML
0.5 INJECTION, SOLUTION SUBCUTANEOUS WEEKLY
Qty: 2 ML | Refills: 0 | Status: CANCELLED | OUTPATIENT
Start: 2023-05-09 | End: 2023-05-31

## 2023-05-11 ENCOUNTER — TELEPHONE (OUTPATIENT)
Dept: INTERNAL MEDICINE CLINIC | Facility: CLINIC | Age: 53
End: 2023-05-11

## 2023-05-11 DIAGNOSIS — E07.9 THYROID DYSFUNCTION: Primary | ICD-10-CM

## 2023-05-11 NOTE — TELEPHONE ENCOUNTER
Pt has a lab order for TSH T4, pt would like her blood test to check T3 as well.  Can we place a new order

## 2023-05-11 NOTE — TELEPHONE ENCOUNTER
Order signed. Let her know that medication dose changes are based on TSH and T4 values and I don't typically order.

## 2023-05-15 ENCOUNTER — HOSPITAL ENCOUNTER (OUTPATIENT)
Age: 53
Discharge: HOME | End: 2023-05-15
Payer: COMMERCIAL

## 2023-05-15 VITALS
HEIGHT: 67 IN | TEMPERATURE: 99 F | OXYGEN SATURATION: 97 % | BODY MASS INDEX: 30.61 KG/M2 | WEIGHT: 195 LBS | RESPIRATION RATE: 20 BRPM | HEART RATE: 102 BPM | SYSTOLIC BLOOD PRESSURE: 140 MMHG | DIASTOLIC BLOOD PRESSURE: 90 MMHG

## 2023-05-15 DIAGNOSIS — J06.9 VIRAL URI WITH COUGH: ICD-10-CM

## 2023-05-15 DIAGNOSIS — H65.92 LEFT NON-SUPPURATIVE OTITIS MEDIA: ICD-10-CM

## 2023-05-15 DIAGNOSIS — R05.1 ACUTE COUGH: Primary | ICD-10-CM

## 2023-05-15 LAB — SARS-COV-2 RNA RESP QL NAA+PROBE: NOT DETECTED

## 2023-05-15 PROCEDURE — 99213 OFFICE O/P EST LOW 20 MIN: CPT | Performed by: NURSE PRACTITIONER

## 2023-05-15 PROCEDURE — U0002 COVID-19 LAB TEST NON-CDC: HCPCS | Performed by: NURSE PRACTITIONER

## 2023-05-15 RX ORDER — CLINDAMYCIN HYDROCHLORIDE 300 MG/1
300 CAPSULE ORAL 3 TIMES DAILY
Qty: 21 CAPSULE | Refills: 0 | Status: SHIPPED | OUTPATIENT
Start: 2023-05-15 | End: 2023-05-22

## 2023-05-15 RX ORDER — BENZONATATE 100 MG/1
100 CAPSULE ORAL 3 TIMES DAILY PRN
Qty: 30 CAPSULE | Refills: 0 | Status: SHIPPED | OUTPATIENT
Start: 2023-05-15 | End: 2023-06-14

## 2023-05-15 NOTE — DISCHARGE INSTRUCTIONS
Take the medications as prescribed. Take an over-the-counter probiotic daily while on the oral antibiotics. Cool-mist humidifier in the same room. Hot steam showers, steam inhalations. Saline irrigation such as the Gianna pot. May continue with your azelastine nasal spray. Please read the attached discharge instructions. Go to your nearest ER for new or worsening symptoms.

## 2023-05-26 ENCOUNTER — LAB ENCOUNTER (OUTPATIENT)
Dept: LAB | Age: 53
End: 2023-05-26
Attending: INTERNAL MEDICINE
Payer: COMMERCIAL

## 2023-05-26 DIAGNOSIS — E11.8 TYPE 2 DIABETES MELLITUS WITH COMPLICATION, WITHOUT LONG-TERM CURRENT USE OF INSULIN (HCC): ICD-10-CM

## 2023-05-26 DIAGNOSIS — E07.9 THYROID DYSFUNCTION: ICD-10-CM

## 2023-05-26 LAB
EST. AVERAGE GLUCOSE BLD GHB EST-MCNC: 126 MG/DL (ref 68–126)
HBA1C MFR BLD: 6 % (ref ?–5.7)
T3FREE SERPL-MCNC: 2.34 PG/ML (ref 2.4–4.2)
TSI SER-ACNC: 1.37 MIU/ML (ref 0.36–3.74)

## 2023-05-26 PROCEDURE — 36415 COLL VENOUS BLD VENIPUNCTURE: CPT

## 2023-05-26 PROCEDURE — 84443 ASSAY THYROID STIM HORMONE: CPT

## 2023-05-26 PROCEDURE — 84481 FREE ASSAY (FT-3): CPT

## 2023-05-26 PROCEDURE — 83036 HEMOGLOBIN GLYCOSYLATED A1C: CPT

## 2023-05-26 PROCEDURE — 3044F HG A1C LEVEL LT 7.0%: CPT | Performed by: INTERNAL MEDICINE

## 2023-05-31 ENCOUNTER — OFFICE VISIT (OUTPATIENT)
Dept: INTERNAL MEDICINE CLINIC | Facility: CLINIC | Age: 53
End: 2023-05-31
Payer: COMMERCIAL

## 2023-05-31 DIAGNOSIS — K76.0 FATTY LIVER: ICD-10-CM

## 2023-05-31 DIAGNOSIS — E66.9 OBESITY (BMI 30-39.9): ICD-10-CM

## 2023-05-31 DIAGNOSIS — E11.8 TYPE 2 DIABETES MELLITUS WITH COMPLICATION, WITHOUT LONG-TERM CURRENT USE OF INSULIN (HCC): ICD-10-CM

## 2023-05-31 DIAGNOSIS — E78.00 PURE HYPERCHOLESTEROLEMIA: ICD-10-CM

## 2023-05-31 PROCEDURE — 97803 MED NUTRITION INDIV SUBSEQ: CPT

## 2023-05-31 NOTE — PATIENT INSTRUCTIONS
Goals:    Yoga videos at 2pm 3 days per week   Walking 3-4 days  Aim for 60-80 grams of protein per day   Aim for 64 fl oz of water per day

## 2023-06-09 ENCOUNTER — TELEMEDICINE (OUTPATIENT)
Dept: INTERNAL MEDICINE CLINIC | Facility: CLINIC | Age: 53
End: 2023-06-09
Payer: COMMERCIAL

## 2023-06-09 DIAGNOSIS — E78.00 PURE HYPERCHOLESTEROLEMIA: ICD-10-CM

## 2023-06-09 DIAGNOSIS — E11.8 TYPE 2 DIABETES MELLITUS WITH COMPLICATION, WITHOUT LONG-TERM CURRENT USE OF INSULIN (HCC): ICD-10-CM

## 2023-06-09 DIAGNOSIS — K76.0 FATTY LIVER: ICD-10-CM

## 2023-06-09 DIAGNOSIS — Z51.81 THERAPEUTIC DRUG MONITORING: Primary | ICD-10-CM

## 2023-06-09 DIAGNOSIS — E66.9 OBESITY (BMI 30-39.9): ICD-10-CM

## 2023-06-09 DIAGNOSIS — F41.9 ANXIETY: ICD-10-CM

## 2023-06-09 PROCEDURE — 99213 OFFICE O/P EST LOW 20 MIN: CPT | Performed by: NURSE PRACTITIONER

## 2023-06-09 RX ORDER — SEMAGLUTIDE 1.7 MG/.75ML
1.7 INJECTION, SOLUTION SUBCUTANEOUS WEEKLY
Qty: 3 ML | Refills: 0 | Status: SHIPPED | OUTPATIENT
Start: 2023-06-09 | End: 2023-07-01

## 2023-06-09 NOTE — PATIENT INSTRUCTIONS
Next steps:  1. Fill your prescribed medication and take as discussed and prescribed: wegovy 1.7mg weekly x 4 weeks and then increase (if tolerating)   2. Schedule a personal nutrition consultation with one of our registered dieticians       1. Drink water with meals and throughout the day, cut down on soda and/or juice if consumed. Consider flavored water options like Bubbly, Spindrift, Hint and Francisco J. 2.  Eat breakfast daily and focus on having protein with each meal, examples include: greek yogurt, cottage cheese, hard boiled egg, whole grain toast with peanut butter. 3.  Reduce refined carbohydrates and sugars which includes items such as sweets, as well as rice, pasta, and bread and make sure to choose whole grain options when having them with just 1 serving per meal about the size of your inner palm. 4.  Consume non starchy veggies daily working towards making them a good 50% of your daily food intake. Add them to lunch and dinner consistently. 5.  Start a daily probiotic: VSL#3 is recommended, (order on line at www.vsl3. com). Take 1 capsule daily with water for 30 days, then reduce to 1 every other day (this will reduce the cost). Capsules can be left out for 2 weeks, but then must be refrigerated. Please download rocky My Fitness Verónica Sven! Or Net Diary to monitor daily dietary intake and you will be able to see if you are eating the right amount of calories or too much or too little which would hinder weight loss. Additionally this will help to see your daily carbohydrate and protein intake. When you set the rocky up choose 1-2 lbs/week as a goal.  Keeping a paper food journal is an option as well to remain accountable for your choices- this is the start to mindful eating! A low calorie diet has been consistently shown to support weight loss. Continue or start exercising to help establish a routine.  If not already exercising begin with 1 day and progress as able with long-term goal of 30 minutes 5 days a week at a minimum. Meditation daily can help manage and control stress. Chronic stress can make weight loss difficult. Exercising is one way to help with stress, but meditation using the CALM Sara or another comparable alternative can be done in your home or place of work with little time commitment. This Sara can also help work on behavior change and improve sleep. Check out the segment under Calm Masterclass and listen to The 4 Pillars of Health. A great way to begin learning about the foundation of lifestyle with practical tips to use in your every day. Check out www.yourweightmatters. org blog for continued daily support and education along this weight loss journey! Patient Resources:     Personal Training/Fitness Classes/Health Coaching     Kristine Hopkins and Johnson Sophmiriam @ [email or web address]/ Full fitness center with group fitness and personal training. Discount available as client of [facility]. Health Coaching and Personal Training with Ze Sadler at our [facility]- individual weekly coaching with option to add personal training and small group fitness classes targeted at weight loss- [phone] and/or email @ Talha Abdi. [email or web address]  360FIT [place] [email or web address] Group Fitness [phone] and/or email Jamil Jaquez at Cele@B[employer]. com  [address] [facility] with multiple locations: Aetna (www.All-Scrap), Eat The ComplyMD (www.Queryly. MyStargo Enterprises), Fit Body Bootcamp (www.StitcherAdsbodybootcamp.MyStargo Enterprises), Tobira Therapeutics (www.Aiming), The Exercise  (www.exercisecoach.MyStargo Enterprises)     Online Fitness  Fitness  on [employer] in 10 DVD series- www. trupa09PQA. MyStargo Enterprises  Sit and Be Fit - Chair exercise series Www.sitandbefit. org  Hip Hop Fit with Eladio Price at www.hiphopfit. net     Apps for on the Tonara 7 Minute Workout (orange box with white 7) - free on the go HIIT training sara  Peloton Sara @ www. Ads-Fi     Nutrition Trackers and Tools  LoseIT! And My Fitness Pal apps and on line for tracking nutrition  NOOM - virtual health coaching  FitFoundation (healthy meals on the go) in [facility] @ www. cnsajtugtxzvm8b. Art Bubba CROSS @ www.ParascaledIngageapp and Joanne Small (keto and low carb plans recommended) @ www. MSAPCL70.LIS, Metabolic Meals @ www. MyMetabolicMeals. com - individual prepared meals to go  Pipeline Micro, Sulmaq, [employer], Every Plate, Pipeline Micro- on line meal delivery programs for preparation at home  [place] [employer] in [place] for homemade meals to go @ www.mealTapMetrics. Buzzient  Diet Doctor @ www. dietdoctor. Buzzient - low carb swaps  YummAdmazely - meal prep and planning sara (www.yummly. com)     Stress Management/Behavior/Mindful Eating  CALM meditation sara (www.Aros Pharma)  Headspace  Am I Hungry? Mindful eating virtual  sara  Www.yourweightmatters. org - Obesity Action Coalition sponsored Blog posts daily  Motivation sara (black box with white \")- daily supportive messages sent to your phone     Books/Video Education/Podcasts  Mindless Eating by Julissa Wilcox  Why We Get Sick by Charity Lee (a book about insulin resistance)  Atomic Habits by Dana Lazar (a book about taking small steps to promote greater behavior change)   Can't Hurt Me by Aloma Carrel (a book exploring the power of discipline in achieving your goals)  The End of Dieting: How to Live for Life by Dr. Steve Zavala M.D. or listen to The [facility] Episode 61: Understanding \"Nutritarian\" Eating w/Dr. Steve Zavala  Your Body in Balance: The [employer] of Food, Hormones, and Health by Dr. Milli Mcnally  The Menopause Diet Plan by Kimberly Shipman and [facility] AT [facility]  The Complete Guide to fasting by Dr. Matt Aldana, [address] [facility] by Prince Byrd, Ph.D, R.D.   Weight Loss Surgery Will Not Treat Food Addiction by Matthew Garcia Ph.D  The [address] [place] [address] on plant based nutrition  Fed Up - documentary about obesity (Free on Utube)  The Truth About Sugar - documentary on sugar (Free on Utube, https://youtu. be/8P8qmsjZG9s)  The Dr. Caryl Alvarado by Dr. Randy Carrlol MD  Fitlosophy Fitspiration - journal to better health (found at [employer] in fitness aisle)  What Happened to You?- a look at the impact trauma has on behavior written by Wilton Lacy and Dr. Dacia Dowd Again by Naya Kelly - discovering your true self after trauma  Susen Osgood talk on Capital Float, The Call to Courage  Podcasts: The Exam Room by the Physician's Committee, Nutrition Facts by Dr. Branden Del Rosario    We are here to support you with weight loss, but please remember that you still need your primary care provider for your routine health maintenance.

## 2023-07-07 ENCOUNTER — PATIENT MESSAGE (OUTPATIENT)
Dept: INTERNAL MEDICINE CLINIC | Facility: CLINIC | Age: 53
End: 2023-07-07

## 2023-07-08 NOTE — TELEPHONE ENCOUNTER
From: Mellisa Pathak  To: DENISE Portillo  Sent: 7/7/2023 10:10 AM CDT  Subject: Andrew     I will be out of medication Sunday, July 9th. I will need a refill for the following Sunday. I am unsure if we are staying at this dose or moving forward to the next dose. What would the benefit be to increasing the dose?

## 2023-07-10 ENCOUNTER — PATIENT MESSAGE (OUTPATIENT)
Dept: INTERNAL MEDICINE CLINIC | Facility: CLINIC | Age: 53
End: 2023-07-10

## 2023-07-10 ENCOUNTER — OFFICE VISIT (OUTPATIENT)
Dept: INTERNAL MEDICINE CLINIC | Facility: CLINIC | Age: 53
End: 2023-07-10
Payer: COMMERCIAL

## 2023-07-10 ENCOUNTER — LAB ENCOUNTER (OUTPATIENT)
Dept: LAB | Age: 53
End: 2023-07-10
Attending: INTERNAL MEDICINE
Payer: COMMERCIAL

## 2023-07-10 VITALS
DIASTOLIC BLOOD PRESSURE: 80 MMHG | RESPIRATION RATE: 16 BRPM | WEIGHT: 206.81 LBS | HEIGHT: 67 IN | SYSTOLIC BLOOD PRESSURE: 132 MMHG | HEART RATE: 82 BPM | BODY MASS INDEX: 32.46 KG/M2 | OXYGEN SATURATION: 100 % | TEMPERATURE: 98 F

## 2023-07-10 DIAGNOSIS — R82.90 ABNORMAL URINALYSIS: ICD-10-CM

## 2023-07-10 DIAGNOSIS — E53.8 VITAMIN B12 DEFICIENCY: Primary | ICD-10-CM

## 2023-07-10 DIAGNOSIS — Z51.81 ENCOUNTER FOR MEDICATION MONITORING: ICD-10-CM

## 2023-07-10 DIAGNOSIS — M54.10 BILATERAL RADIATING LEG PAIN: ICD-10-CM

## 2023-07-10 DIAGNOSIS — D72.829 LEUKOCYTOSIS, UNSPECIFIED TYPE: ICD-10-CM

## 2023-07-10 DIAGNOSIS — E11.8 TYPE 2 DIABETES MELLITUS WITH COMPLICATION, WITHOUT LONG-TERM CURRENT USE OF INSULIN (HCC): Primary | ICD-10-CM

## 2023-07-10 DIAGNOSIS — E66.9 OBESITY (BMI 30-39.9): ICD-10-CM

## 2023-07-10 LAB
BASOPHILS # BLD AUTO: 0.11 X10(3) UL (ref 0–0.2)
BASOPHILS NFR BLD AUTO: 0.9 %
BILIRUB UR QL STRIP.AUTO: NEGATIVE
CLARITY UR REFRACT.AUTO: CLEAR
COLOR UR AUTO: YELLOW
EOSINOPHIL # BLD AUTO: 0.19 X10(3) UL (ref 0–0.7)
EOSINOPHIL NFR BLD AUTO: 1.5 %
ERYTHROCYTE [DISTWIDTH] IN BLOOD BY AUTOMATED COUNT: 13.6 %
GLUCOSE UR STRIP.AUTO-MCNC: NEGATIVE MG/DL
HCT VFR BLD AUTO: 40.4 %
HGB BLD-MCNC: 13.2 G/DL
IMM GRANULOCYTES # BLD AUTO: 0.04 X10(3) UL (ref 0–1)
IMM GRANULOCYTES NFR BLD: 0.3 %
KETONES UR STRIP.AUTO-MCNC: NEGATIVE MG/DL
LEUKOCYTE ESTERASE UR QL STRIP.AUTO: NEGATIVE
LYMPHOCYTES # BLD AUTO: 3.86 X10(3) UL (ref 1–4)
LYMPHOCYTES NFR BLD AUTO: 31 %
MCH RBC QN AUTO: 29.5 PG (ref 26–34)
MCHC RBC AUTO-ENTMCNC: 32.7 G/DL (ref 31–37)
MCV RBC AUTO: 90.2 FL
MONOCYTES # BLD AUTO: 0.87 X10(3) UL (ref 0.1–1)
MONOCYTES NFR BLD AUTO: 7 %
NEUTROPHILS # BLD AUTO: 7.39 X10 (3) UL (ref 1.5–7.7)
NEUTROPHILS # BLD AUTO: 7.39 X10(3) UL (ref 1.5–7.7)
NEUTROPHILS NFR BLD AUTO: 59.3 %
NITRITE UR QL STRIP.AUTO: NEGATIVE
PH UR STRIP.AUTO: 5 [PH] (ref 5–8)
PLATELET # BLD AUTO: 355 10(3)UL (ref 150–450)
PROT UR STRIP.AUTO-MCNC: NEGATIVE MG/DL
RBC # BLD AUTO: 4.48 X10(6)UL
RBC UR QL AUTO: NEGATIVE
SP GR UR STRIP.AUTO: 1.01 (ref 1–1.03)
UROBILINOGEN UR STRIP.AUTO-MCNC: <2 MG/DL
VIT B12 SERPL-MCNC: 296 PG/ML (ref 193–986)
WBC # BLD AUTO: 12.5 X10(3) UL (ref 4–11)

## 2023-07-10 PROCEDURE — 3075F SYST BP GE 130 - 139MM HG: CPT | Performed by: INTERNAL MEDICINE

## 2023-07-10 PROCEDURE — 81003 URINALYSIS AUTO W/O SCOPE: CPT | Performed by: INTERNAL MEDICINE

## 2023-07-10 PROCEDURE — 3079F DIAST BP 80-89 MM HG: CPT | Performed by: INTERNAL MEDICINE

## 2023-07-10 PROCEDURE — 99214 OFFICE O/P EST MOD 30 MIN: CPT | Performed by: INTERNAL MEDICINE

## 2023-07-10 PROCEDURE — 82607 VITAMIN B-12: CPT | Performed by: INTERNAL MEDICINE

## 2023-07-10 PROCEDURE — 3008F BODY MASS INDEX DOCD: CPT | Performed by: INTERNAL MEDICINE

## 2023-07-10 PROCEDURE — 85025 COMPLETE CBC W/AUTO DIFF WBC: CPT | Performed by: INTERNAL MEDICINE

## 2023-07-10 RX ORDER — SEMAGLUTIDE 1.7 MG/.75ML
1.7 INJECTION, SOLUTION SUBCUTANEOUS WEEKLY
COMMUNITY
Start: 2023-06-09

## 2023-07-11 RX ORDER — CYANOCOBALAMIN 1000 UG/ML
1000 INJECTION, SOLUTION INTRAMUSCULAR; SUBCUTANEOUS ONCE
Status: COMPLETED | OUTPATIENT
Start: 2023-07-11 | End: 2023-07-14

## 2023-07-11 RX ORDER — SEMAGLUTIDE 1.7 MG/.75ML
1.7 INJECTION, SOLUTION SUBCUTANEOUS WEEKLY
Qty: 3 ML | Refills: 1 | Status: SHIPPED | OUTPATIENT
Start: 2023-07-11

## 2023-07-11 NOTE — TELEPHONE ENCOUNTER
Please advise on [facility] from patient. Last vitamin B12 07/10/2023 - patient was advised to begin over the counter B12 supplementation.

## 2023-07-11 NOTE — TELEPHONE ENCOUNTER
Requesting   Requested Prescriptions     Pending Prescriptions Disp Refills    semaglutide-weight management (WEGOVY) 1.7 MG/0.75ML Subcutaneous Solution Auto-injector 3 mL 0     Sig: Inject 0.75 mL (1.7 mg total) into the skin once a week for 4 doses. LOV: 6/9/23  RTC: 3 months  Filled: 6/9/23 #3 with 0 refills    No future appointments.   Pt wants to stay an this dose

## 2023-07-11 NOTE — TELEPHONE ENCOUNTER
From: Bud Majano  To: Jamia Mayer MD  Sent: 7/10/2023 4:21 PM CDT  Subject: Question regarding VITAMIN B12    Would it be possible to start with a B12 injection like we discussed? I would like to get levels up ASAP.

## 2023-07-13 NOTE — TELEPHONE ENCOUNTER
Future Appointments   Date Time Provider Jennifer Serrato   7/14/2023  1:30 PM [facility] 08 NURSE EMG 8 [facility] [place]

## 2023-07-14 ENCOUNTER — NURSE ONLY (OUTPATIENT)
Dept: INTERNAL MEDICINE CLINIC | Facility: CLINIC | Age: 53
End: 2023-07-14
Payer: COMMERCIAL

## 2023-07-14 PROCEDURE — 96372 THER/PROPH/DIAG INJ SC/IM: CPT | Performed by: INTERNAL MEDICINE

## 2023-07-14 RX ADMIN — CYANOCOBALAMIN 1000 MCG: 1000 INJECTION, SOLUTION INTRAMUSCULAR; SUBCUTANEOUS at 13:37:00

## 2023-08-07 DIAGNOSIS — E66.9 OBESITY (BMI 30-39.9): Primary | ICD-10-CM

## 2023-08-10 RX ORDER — SEMAGLUTIDE 1.7 MG/.75ML
1.7 INJECTION, SOLUTION SUBCUTANEOUS WEEKLY
Qty: 3 ML | Refills: 1 | Status: SHIPPED | OUTPATIENT
Start: 2023-08-10

## 2023-08-11 RX ORDER — SEMAGLUTIDE 1.7 MG/.75ML
1.7 INJECTION, SOLUTION SUBCUTANEOUS WEEKLY
Qty: 4 EACH | Refills: 0 | OUTPATIENT
Start: 2023-08-11

## 2023-09-05 DIAGNOSIS — E66.9 OBESITY (BMI 30-39.9): ICD-10-CM

## 2023-09-05 RX ORDER — SEMAGLUTIDE 1.7 MG/.75ML
1.7 INJECTION, SOLUTION SUBCUTANEOUS WEEKLY
Qty: 3 ML | Refills: 1 | OUTPATIENT
Start: 2023-09-05

## 2023-09-05 NOTE — TELEPHONE ENCOUNTER
Requesting   Requested Prescriptions     Pending Prescriptions Disp Refills    semaglutide-weight management (WEGOVY) 1.7 MG/0.75ML Subcutaneous Solution Auto-injector 3 mL 1     Sig: Inject 0.75 mL (1.7 mg total) into the skin once a week.      LOV: 6/9/23  RTC:  3 months  Filled: 8/10/23 #3 with 1 refills    Future Appointments   Date Time Provider Jennifer Serrato   10/30/2023 10:55 AM DO GUS Wiggins

## 2023-10-02 DIAGNOSIS — E66.9 OBESITY (BMI 30-39.9): ICD-10-CM

## 2023-10-04 RX ORDER — SEMAGLUTIDE 1.7 MG/.75ML
1.7 INJECTION, SOLUTION SUBCUTANEOUS WEEKLY
Qty: 3 ML | Refills: 1 | Status: SHIPPED | OUTPATIENT
Start: 2023-10-04

## 2023-10-30 ENCOUNTER — OFFICE VISIT (OUTPATIENT)
Dept: NEUROLOGY | Facility: CLINIC | Age: 53
End: 2023-10-30

## 2023-10-30 VITALS
WEIGHT: 200.38 LBS | DIASTOLIC BLOOD PRESSURE: 74 MMHG | OXYGEN SATURATION: 96 % | HEART RATE: 82 BPM | SYSTOLIC BLOOD PRESSURE: 128 MMHG | RESPIRATION RATE: 16 BRPM | BODY MASS INDEX: 31 KG/M2

## 2023-10-30 DIAGNOSIS — M79.601 PARESTHESIA AND PAIN OF BOTH UPPER EXTREMITIES: ICD-10-CM

## 2023-10-30 DIAGNOSIS — G62.9 SMALL FIBER NEUROPATHY: ICD-10-CM

## 2023-10-30 DIAGNOSIS — M79.602 PARESTHESIA AND PAIN OF BOTH UPPER EXTREMITIES: ICD-10-CM

## 2023-10-30 DIAGNOSIS — R20.2 PARESTHESIA OF BOTH FEET: Primary | ICD-10-CM

## 2023-10-30 DIAGNOSIS — R20.2 PARESTHESIA AND PAIN OF BOTH UPPER EXTREMITIES: ICD-10-CM

## 2023-10-30 DIAGNOSIS — E53.8 B12 DEFICIENCY: ICD-10-CM

## 2023-10-30 PROCEDURE — 3074F SYST BP LT 130 MM HG: CPT | Performed by: OTHER

## 2023-10-30 PROCEDURE — 3078F DIAST BP <80 MM HG: CPT | Performed by: OTHER

## 2023-10-30 PROCEDURE — 99244 OFF/OP CNSLTJ NEW/EST MOD 40: CPT | Performed by: OTHER

## 2023-10-30 NOTE — PROGRESS NOTES
Patient states she had COVID 08/2021 and again 12/2022. Patient states a cold sensation in the BUE and NLE. Patient states pain sensation in the back of her legs. Denies numbness or tingling in the BUE and BLE. Patient denies balance issues. Patient currently has a HA.

## 2023-10-30 NOTE — PROGRESS NOTES
[address]   Neurology    Charlie Boyle Patient Status:  No patient class for patient encounter    1970 MRN [id number]   Location [facility] PCP Lisa De La Rosa MD              HPI:   Charlie Boyle is a(n) 46year old female with history of prediabetes, HL. who presents at the request of Lisa De La Rosa MD for evaluation of bilateral radiating leg pain. Started over a year ago. Episodes last a few seconds, and is a an electric like pain that radiates from the ankle into the calves and into the posterior thigh. No associated back pain. Denies numbness. Had similar symptoms in the past when she saw her children get hurt. She would then feel a similar sensation, when feeling perceived pain. Now triggered by painful sensations, such as when she steps on something uncomfortable. When her arms are cold, they will ache. This started after she had a Covid illness . Still has fatigue since then. No allodynia, no numbness. B12 was found to be low, got one B12 injection and taking po B12, since 2023. Lost weight intentionally between  and .       Pertinent imaging and laboratory work-up:   Component      Latest Ref Rng 2023 7/10/2023   TSH      0.358 - 3.740 mIU/mL 1.370     T3 FREE      2.40 - 4.20 pg/mL 2.34 (L)     Vitamin B12      193 - 986 pg/mL  296      Component      Latest Ref Rng 2023   HEMOGLOBIN A1c      <5.7 % 6.0 (H)    ESTIMATED AVERAGE GLUCOSE      68 - 126 mg/dL 126       Component      Latest Ref Rng 8/10/2022   HEMOGLOBIN A1c      <5.7 % 6.3 (H)    ESTIMATED AVERAGE GLUCOSE      68 - 126 mg/dL 134 (H)           Past Medical History:  Past Medical History:   Diagnosis Date    Abdominal distention     Anemia     Bloating     Body piercing     Brachial neuritis or radiculitis NOS 11    Diabetes mellitus (HCC)     Disorder of thyroid     Dizziness and giddiness 11    Fatigue     Fibroid uterus 11/29/2018    Fibroids 10/10/2018    Fibromyalgia     Frequency of urination     Heartburn     Menorrhagia 10/10/2018    Multiple thyroid nodules     Other abnormal glucose 09/13/11    Other and unspecified hyperlipidemia 09/13/11    Other malaise and fatigue 08/26/11    Peripheral vertigo, unspecified 09/20/11    Prediabetes     Seasonal allergies     Vertigo     (on and off for 10 yrs)    Visual impairment     contact lenses    Wears glasses         Past Surgical History:  Past Surgical History:   Procedure Laterality Date    OTHER SURGICAL HISTORY  06/10/2021    removal of lipoma on back     OTHER SURGICAL HISTORY  08/25/2022    sinus & nasal surgery    VAGINAL HYSTERECTOMY N/A 12/07/2018    Procedure: VAGINAL HYSTERECTOMY;  Surgeon: Bren Correa MD;  Location: [address] [place] [address] MAIN OR       Family History:  family history includes Cancer in an other family member; Diabetes in her father and sister; Hypertension in her father; No Known Problems in her sister; Ovarian Cancer in her sister; Stroke in her paternal grandmother and another family member; Lucie Crate in her mother; ovarian cancer in her sister; thyroid disease in her mother. Social History:   reports that she quit smoking about 19 years ago. Her smoking use included cigarettes. She has a 3.75 pack-year smoking history. She has never used smokeless tobacco. She reports that she does not currently use alcohol. She reports that she does not use drugs.     Allergies:    Wasps                   SWELLING  Coffee Bean Extract*    OTHER (SEE COMMENTS)    Comment:Per allergy testing  Corn                    UNKNOWN    Comment:Swelling, inflammation, SOB  Erythromycin            OTHER (SEE COMMENTS)    Comment:Abdominal pain  Other                   OTHER (SEE COMMENTS)    Comment:Cayenne pepper- SOB  Pcn [Penicillins]       REACTIVE AIRWAY DISEASE  Pork Derived Produc*    OTHER (SEE COMMENTS)    Comment:GI upset    MEDICATIONS:    Current Outpatient Medications: semaglutide-weight management (WEGOVY) 1.7 MG/0.75ML Subcutaneous Solution Auto-injector, Inject 0.75 mL (1.7 mg total) into the skin once a week., Disp: 3 mL, Rfl: 1    semaglutide-weight management (WEGOVY) 1.7 MG/0.75ML Subcutaneous Solution Auto-injector, Inject 0.75 mL (1.7 mg total) into the skin once a week., Disp: 3 mL, Rfl: 1    semaglutide-weight management (WEGOVY) 1.7 MG/0.75ML Subcutaneous Solution Auto-injector, Inject 0.75 mL (1.7 mg total) into the skin once a week., Disp: , Rfl:     metFORMIN 500 MG Oral Tab, Take 1 tablet (500 mg total) by mouth 2 (two) times daily with meals. , Disp: 180 tablet, Rfl: 3    estradiol 0.5 MG Oral Tab, Take 1 tablet (0.5 mg total) by mouth daily. , Disp: 90 tablet, Rfl: 3    azelastine 0.1 % Nasal Solution, 2 sprays by Nasal route 2 (two) times daily as needed. , Disp: , Rfl:     metRONIDAZOLE 0.75 % External Cream, Apply 1 Application. topically daily. , Disp: , Rfl:     halobetasol 0.05 % External Cream, Apply to AA BID x 2 weeks, hold x 1 week, repeat if needed, Disp: 15 g, Rfl: 2    liothyronine 5 MCG Oral Tab, Take 1 tablet (5 mcg total) by mouth daily. , Disp: 90 tablet, Rfl: 3    AUVI-Q 0.3 MG/0.3ML Injection Solution Auto-injector, Inject 0.3 mL (1 each total) as directed as needed. , Disp: 1 each, Rfl: 3    Cholecalciferol (VITAMIN D) 1000 UNITS Oral Tab, Take 1,000 Units by mouth daily. , Disp: , Rfl:     Acidophilus/Pectin Oral Cap, Take 1 capsule by mouth daily. , Disp: , Rfl:       Review of Systems:   A comprehensive 10 point review of systems was completed. Pertinent positives and negatives noted in the HPI. PHYSICAL EXAM:   Neurologic Exam  Vitals   10/30/23  1102   BP: 128/74   Pulse: 82   Resp: 16     General Appearance: Patient is a 46year old female in no acute distress  Cardiac: Normal rate & regular rhythm  Skin: There are no rashes or other skin lesions.   Musculoskeletal: There is no scoliosis, or joint deformities  Neurologic examination:  Mental status: Patient is alert, attentive, and oriented x 3. Language is coherent and fluent without aphasia. Memory, comprehension and ability to follow commands were intact. Cranial nerves II-XII: Optic discs were sharp. Pupils were round and reacted to light. Extraocular movements were full. There was no face, jaw, palate or tongue weakness or atrophy. Facial sensation was normal. Hearing was grossly intact. Shoulder shrug was normal.   Motor exam revealed normal muscle bulk and tone. No atrophy or fasciculations. Manual muscle testing revealed MRC grade 5/5 strength throughout including proximal and distal muscles of the arms and legs. Deep tendon reflexes were 2 at the biceps, brachioradialis, triceps, knee jerk, and ankle jerk. Plantar responses were flexor bilaterally. Sensory exam revealed normal light touch perception. Vibratory perception was 18 sec and proprioception were intact at the toes. Pinprick and temperature were normal. Romberg sign was absent. Complex motor skills revealed normal coordination. Finger-nose-finger intact. Gait was narrow and stable, was able to walk on heels, toes and tandem without any difficulty.      ASSESSMENT/ACTIVE PROBLEM LIST:   Paresthesia of both feet  (primary encounter diagnosis)  Small fiber neuropathy  B12 deficiency  Paresthesia and pain of both upper extremities    Discussion/Plan:  Small fiber neuropathy related to B12 deficiency  Do not recommend EMG at this time, as there is no evidence of large fiber sensory loss  Has had improvement with initiation of B12 supplementation  Repeat B12 level  Discussed prediabetes and metabolic syndrome as a risk factor for polyneuropathy    Intolerance to cold/disturbance of skin sensation in arms- does not appear neuropathic, but will obtain an EMG to rule out atypical radiculopathy    Requested Prescriptions      No prescriptions requested or ordered in this encounter          We discussed in depth regarding the diagnosis, prognosis, treatment. The patient was given ample opportunity to ask questions. All questions and concerns were addressed.      Jennifer Whitten DO  Neuromuscular and General Neurology  [facility]

## 2023-11-06 DIAGNOSIS — E66.9 OBESITY (BMI 30-39.9): ICD-10-CM

## 2023-11-06 RX ORDER — SEMAGLUTIDE 1.7 MG/.75ML
1.7 INJECTION, SOLUTION SUBCUTANEOUS WEEKLY
Qty: 3 ML | Refills: 1 | Status: CANCELLED | OUTPATIENT
Start: 2023-11-06

## 2023-11-07 ENCOUNTER — HOSPITAL ENCOUNTER (OUTPATIENT)
Age: 53
Discharge: HOME | End: 2023-11-07
Payer: COMMERCIAL

## 2023-11-07 VITALS
DIASTOLIC BLOOD PRESSURE: 91 MMHG | WEIGHT: 190 LBS | HEIGHT: 67 IN | BODY MASS INDEX: 29.82 KG/M2 | SYSTOLIC BLOOD PRESSURE: 142 MMHG | TEMPERATURE: 98 F | HEART RATE: 95 BPM | RESPIRATION RATE: 18 BRPM | OXYGEN SATURATION: 96 %

## 2023-11-07 DIAGNOSIS — H69.93 EUSTACHIAN TUBE DYSFUNCTION, BILATERAL: Primary | ICD-10-CM

## 2023-11-07 PROCEDURE — 99213 OFFICE O/P EST LOW 20 MIN: CPT | Performed by: NURSE PRACTITIONER

## 2023-11-07 RX ORDER — METHYLPREDNISOLONE 4 MG/1
TABLET ORAL
Qty: 1 EACH | Refills: 0 | Status: SHIPPED | OUTPATIENT
Start: 2023-11-07

## 2023-11-08 NOTE — TELEPHONE ENCOUNTER
Requesting wegovy  LOV: 6/9/23  RTC: 3 months  Last Relevant Labs: na  Filled: 10/4/23 #3ml with 1 refills    No future appointments.

## 2023-11-10 ENCOUNTER — TELEPHONE (OUTPATIENT)
Dept: INTERNAL MEDICINE CLINIC | Facility: CLINIC | Age: 53
End: 2023-11-10

## 2023-11-12 ENCOUNTER — HOSPITAL ENCOUNTER (EMERGENCY)
Age: 53
Discharge: HOME | End: 2023-11-12
Attending: EMERGENCY MEDICINE
Payer: COMMERCIAL

## 2023-11-12 VITALS
WEIGHT: 201 LBS | DIASTOLIC BLOOD PRESSURE: 92 MMHG | RESPIRATION RATE: 20 BRPM | BODY MASS INDEX: 31.55 KG/M2 | TEMPERATURE: 98 F | SYSTOLIC BLOOD PRESSURE: 144 MMHG | HEIGHT: 67 IN | OXYGEN SATURATION: 95 % | HEART RATE: 110 BPM

## 2023-11-12 DIAGNOSIS — R19.7 DIARRHEA, UNSPECIFIED TYPE: ICD-10-CM

## 2023-11-12 DIAGNOSIS — R11.2 NAUSEA AND VOMITING, UNSPECIFIED VOMITING TYPE: Primary | ICD-10-CM

## 2023-11-12 LAB
ALBUMIN SERPL-MCNC: 3.8 G/DL (ref 3.4–5)
ALBUMIN/GLOB SERPL: 0.9 {RATIO} (ref 1–2)
ALP LIVER SERPL-CCNC: 97 U/L
ALT SERPL-CCNC: 24 U/L
ANION GAP SERPL CALC-SCNC: 5 MMOL/L (ref 0–18)
AST SERPL-CCNC: 9 U/L (ref 15–37)
BASOPHILS # BLD AUTO: 0.06 X10(3) UL (ref 0–0.2)
BASOPHILS NFR BLD AUTO: 0.3 %
BILIRUB SERPL-MCNC: 0.6 MG/DL (ref 0.1–2)
BILIRUB UR QL STRIP.AUTO: NEGATIVE
BUN BLD-MCNC: 17 MG/DL (ref 9–23)
CALCIUM BLD-MCNC: 9 MG/DL (ref 8.5–10.1)
CHLORIDE SERPL-SCNC: 106 MMOL/L (ref 98–112)
CLARITY UR REFRACT.AUTO: CLEAR
CO2 SERPL-SCNC: 26 MMOL/L (ref 21–32)
COLOR UR AUTO: YELLOW
CREAT BLD-MCNC: 1.06 MG/DL
EGFRCR SERPLBLD CKD-EPI 2021: 62 ML/MIN/1.73M2 (ref 60–?)
EOSINOPHIL # BLD AUTO: 0 X10(3) UL (ref 0–0.7)
EOSINOPHIL NFR BLD AUTO: 0 %
ERYTHROCYTE [DISTWIDTH] IN BLOOD BY AUTOMATED COUNT: 13.8 %
GLOBULIN PLAS-MCNC: 4.2 G/DL (ref 2.8–4.4)
GLUCOSE BLD-MCNC: 148 MG/DL (ref 70–99)
GLUCOSE UR STRIP.AUTO-MCNC: NEGATIVE MG/DL
HCT VFR BLD AUTO: 46.2 %
HGB BLD-MCNC: 15.1 G/DL
IMM GRANULOCYTES # BLD AUTO: 0.09 X10(3) UL (ref 0–1)
IMM GRANULOCYTES NFR BLD: 0.5 %
KETONES UR STRIP.AUTO-MCNC: 15 MG/DL
LEUKOCYTE ESTERASE UR QL STRIP.AUTO: NEGATIVE
LIPASE SERPL-CCNC: 35 U/L (ref 13–75)
LYMPHOCYTES # BLD AUTO: 0.62 X10(3) UL (ref 1–4)
LYMPHOCYTES NFR BLD AUTO: 3.1 %
MCH RBC QN AUTO: 29.8 PG (ref 26–34)
MCHC RBC AUTO-ENTMCNC: 32.7 G/DL (ref 31–37)
MCV RBC AUTO: 91.1 FL
MONOCYTES # BLD AUTO: 0.43 X10(3) UL (ref 0.1–1)
MONOCYTES NFR BLD AUTO: 2.2 %
NEUTROPHILS # BLD AUTO: 18.51 X10 (3) UL (ref 1.5–7.7)
NEUTROPHILS # BLD AUTO: 18.51 X10(3) UL (ref 1.5–7.7)
NEUTROPHILS NFR BLD AUTO: 93.9 %
NITRITE UR QL STRIP.AUTO: NEGATIVE
OSMOLALITY SERPL CALC.SUM OF ELEC: 288 MOSM/KG (ref 275–295)
PH UR STRIP.AUTO: 6 [PH] (ref 5–8)
PLATELET # BLD AUTO: 326 10(3)UL (ref 150–450)
POTASSIUM SERPL-SCNC: 4 MMOL/L (ref 3.5–5.1)
PROT SERPL-MCNC: 8 G/DL (ref 6.4–8.2)
RBC # BLD AUTO: 5.07 X10(6)UL
RBC UR QL AUTO: NEGATIVE
SODIUM SERPL-SCNC: 137 MMOL/L (ref 136–145)
SP GR UR STRIP.AUTO: 1.02 (ref 1–1.03)
UROBILINOGEN UR STRIP.AUTO-MCNC: 0.2 MG/DL
WBC # BLD AUTO: 19.7 X10(3) UL (ref 4–11)

## 2023-11-12 PROCEDURE — 83690 ASSAY OF LIPASE: CPT | Performed by: EMERGENCY MEDICINE

## 2023-11-12 PROCEDURE — 85025 COMPLETE CBC W/AUTO DIFF WBC: CPT | Performed by: EMERGENCY MEDICINE

## 2023-11-12 PROCEDURE — 96361 HYDRATE IV INFUSION ADD-ON: CPT

## 2023-11-12 PROCEDURE — 99284 EMERGENCY DEPT VISIT MOD MDM: CPT

## 2023-11-12 PROCEDURE — 80053 COMPREHEN METABOLIC PANEL: CPT | Performed by: EMERGENCY MEDICINE

## 2023-11-12 PROCEDURE — 81003 URINALYSIS AUTO W/O SCOPE: CPT | Performed by: EMERGENCY MEDICINE

## 2023-11-12 PROCEDURE — 96374 THER/PROPH/DIAG INJ IV PUSH: CPT

## 2023-11-12 RX ORDER — ONDANSETRON 2 MG/ML
4 INJECTION INTRAMUSCULAR; INTRAVENOUS ONCE
Status: COMPLETED | OUTPATIENT
Start: 2023-11-12 | End: 2023-11-12

## 2023-11-12 RX ORDER — ONDANSETRON 4 MG/1
4 TABLET, ORALLY DISINTEGRATING ORAL EVERY 4 HOURS PRN
Qty: 10 TABLET | Refills: 0 | Status: SHIPPED | OUTPATIENT
Start: 2023-11-12 | End: 2023-11-19

## 2023-11-13 NOTE — DISCHARGE INSTRUCTIONS
Follow-up with your primary care doctor within the next week for reassessment. Take the medication as prescribed. Return for intractable vomiting, abdominal pain, fevers or any other concerning symptoms.

## 2023-11-17 RX ORDER — AZELASTINE 1 MG/ML
2 SPRAY, METERED NASAL 2 TIMES DAILY PRN
Qty: 90 ML | Refills: 3 | Status: SHIPPED | OUTPATIENT
Start: 2023-11-17

## 2023-11-17 NOTE — TELEPHONE ENCOUNTER
azelastine 0.1 % Nasal Solution         Si sprays by Nasal route 2 (two) times daily as needed. Disp: Not specified    Refills: 0    Start: 2023    Class: Normal    Non-formulary    Last ordered: 1 year ago (2022) by Reported External/Patient    Patient comment: I am oit if this medication and in need if a refill. I do have an appointment set up-i took forsr available, but it is a month out. Can i get one refill to get me through?     Allergy Medication Protocol [id number] 03:23 PM    Appointment in the past 12 or next 3 months      LOV 7/10/23  RTC none  Filled 22   Future Appointments   Date Time Provider Jennifer Serrato   2023 12:40 PM DENISE Gonsalez [id number]

## 2023-11-20 NOTE — TELEPHONE ENCOUNTER
Form completed and faxed back today as urgent and attached last office visit notes  Faxed to [phone]

## 2023-11-27 NOTE — TELEPHONE ENCOUNTER
Patient left message on nurse line over the holiday she is having no luck finding wegovy 1 mg and is very frustrated. Spent 2 hours calling around. AMF sent her a my chart message 11/24/23 and patient read 11/25/23. I am not sure if she called before the message sent by Aditi Bansal. I reached out to her and left a message to let me know so we can try and help if still needed.

## 2023-12-07 ENCOUNTER — LAB ENCOUNTER (OUTPATIENT)
Dept: LAB | Age: 53
End: 2023-12-07
Attending: INTERNAL MEDICINE
Payer: COMMERCIAL

## 2023-12-07 ENCOUNTER — OFFICE VISIT (OUTPATIENT)
Dept: INTERNAL MEDICINE CLINIC | Facility: CLINIC | Age: 53
End: 2023-12-07
Payer: COMMERCIAL

## 2023-12-07 VITALS
HEIGHT: 67 IN | TEMPERATURE: 98 F | BODY MASS INDEX: 32.49 KG/M2 | RESPIRATION RATE: 16 BRPM | WEIGHT: 207 LBS | OXYGEN SATURATION: 99 % | HEART RATE: 88 BPM | DIASTOLIC BLOOD PRESSURE: 82 MMHG | SYSTOLIC BLOOD PRESSURE: 126 MMHG

## 2023-12-07 DIAGNOSIS — D72.829 LEUKOCYTOSIS, UNSPECIFIED TYPE: ICD-10-CM

## 2023-12-07 DIAGNOSIS — E11.8 TYPE 2 DIABETES MELLITUS WITH COMPLICATION, WITHOUT LONG-TERM CURRENT USE OF INSULIN (HCC): ICD-10-CM

## 2023-12-07 DIAGNOSIS — Z00.00 ROUTINE PHYSICAL EXAMINATION: Primary | ICD-10-CM

## 2023-12-07 DIAGNOSIS — E66.9 CLASS 1 OBESITY WITHOUT SERIOUS COMORBIDITY WITH BODY MASS INDEX (BMI) OF 32.0 TO 32.9 IN ADULT, UNSPECIFIED OBESITY TYPE: ICD-10-CM

## 2023-12-07 DIAGNOSIS — E78.2 MIXED HYPERLIPIDEMIA: ICD-10-CM

## 2023-12-07 DIAGNOSIS — G62.9 SMALL FIBER NEUROPATHY: ICD-10-CM

## 2023-12-07 PROBLEM — N30.90 CYSTITIS: Status: RESOLVED | Noted: 2021-04-09 | Resolved: 2023-12-07

## 2023-12-07 PROBLEM — Z51.81 THERAPEUTIC DRUG MONITORING: Status: RESOLVED | Noted: 2021-11-10 | Resolved: 2023-12-07

## 2023-12-07 PROBLEM — D17.1 LIPOMA OF TORSO: Status: RESOLVED | Noted: 2021-05-26 | Resolved: 2023-12-07

## 2023-12-07 PROBLEM — Z12.11 ENCOUNTER FOR SCREENING COLONOSCOPY: Status: RESOLVED | Noted: 2022-05-05 | Resolved: 2023-12-07

## 2023-12-07 LAB
BASOPHILS # BLD AUTO: 0.09 X10(3) UL (ref 0–0.2)
BASOPHILS NFR BLD AUTO: 0.7 %
CHOLEST SERPL-MCNC: 240 MG/DL (ref ?–200)
CREAT UR-SCNC: 131 MG/DL
EOSINOPHIL # BLD AUTO: 0.23 X10(3) UL (ref 0–0.7)
EOSINOPHIL NFR BLD AUTO: 1.8 %
ERYTHROCYTE [DISTWIDTH] IN BLOOD BY AUTOMATED COUNT: 13.5 %
EST. AVERAGE GLUCOSE BLD GHB EST-MCNC: 126 MG/DL (ref 68–126)
FASTING PATIENT LIPID ANSWER: NO
HBA1C MFR BLD: 6 % (ref ?–5.7)
HCT VFR BLD AUTO: 41.6 %
HDLC SERPL-MCNC: 50 MG/DL (ref 40–59)
HGB BLD-MCNC: 13.2 G/DL
IMM GRANULOCYTES # BLD AUTO: 0.04 X10(3) UL (ref 0–1)
IMM GRANULOCYTES NFR BLD: 0.3 %
LDLC SERPL CALC-MCNC: 167 MG/DL (ref ?–100)
LYMPHOCYTES # BLD AUTO: 4.27 X10(3) UL (ref 1–4)
LYMPHOCYTES NFR BLD AUTO: 33.8 %
MCH RBC QN AUTO: 29.7 PG (ref 26–34)
MCHC RBC AUTO-ENTMCNC: 31.7 G/DL (ref 31–37)
MCV RBC AUTO: 93.5 FL
MICROALBUMIN UR-MCNC: 0.74 MG/DL
MICROALBUMIN/CREAT 24H UR-RTO: 5.6 UG/MG (ref ?–30)
MONOCYTES # BLD AUTO: 0.8 X10(3) UL (ref 0.1–1)
MONOCYTES NFR BLD AUTO: 6.3 %
NEUTROPHILS # BLD AUTO: 7.21 X10 (3) UL (ref 1.5–7.7)
NEUTROPHILS # BLD AUTO: 7.21 X10(3) UL (ref 1.5–7.7)
NEUTROPHILS NFR BLD AUTO: 57.1 %
NONHDLC SERPL-MCNC: 190 MG/DL (ref ?–130)
PLATELET # BLD AUTO: 356 10(3)UL (ref 150–450)
RBC # BLD AUTO: 4.45 X10(6)UL
TRIGL SERPL-MCNC: 127 MG/DL (ref 30–149)
VIT B12 SERPL-MCNC: 1194 PG/ML (ref 193–986)
VLDLC SERPL CALC-MCNC: 25 MG/DL (ref 0–30)
WBC # BLD AUTO: 12.6 X10(3) UL (ref 4–11)

## 2023-12-07 PROCEDURE — 3074F SYST BP LT 130 MM HG: CPT | Performed by: INTERNAL MEDICINE

## 2023-12-07 PROCEDURE — 83036 HEMOGLOBIN GLYCOSYLATED A1C: CPT

## 2023-12-07 PROCEDURE — 3008F BODY MASS INDEX DOCD: CPT | Performed by: INTERNAL MEDICINE

## 2023-12-07 PROCEDURE — 82607 VITAMIN B-12: CPT

## 2023-12-07 PROCEDURE — 80061 LIPID PANEL: CPT

## 2023-12-07 PROCEDURE — 82043 UR ALBUMIN QUANTITATIVE: CPT

## 2023-12-07 PROCEDURE — 85025 COMPLETE CBC W/AUTO DIFF WBC: CPT

## 2023-12-07 PROCEDURE — 3079F DIAST BP 80-89 MM HG: CPT | Performed by: INTERNAL MEDICINE

## 2023-12-07 PROCEDURE — 82570 ASSAY OF URINE CREATININE: CPT

## 2023-12-07 PROCEDURE — 99396 PREV VISIT EST AGE 40-64: CPT | Performed by: INTERNAL MEDICINE

## 2023-12-07 NOTE — PATIENT INSTRUCTIONS
Continue to exercise at least 150 minutes a week and Eat a plant based diet     Please take 2000 IU of vitamin D daily    I have ordered blood work for you    Check to see if you can get mounjaro from the weight loss clinic    Please schedule your eye appointment  Please schedule a nurse visit for the vaccines

## 2023-12-14 ENCOUNTER — OFFICE VISIT (OUTPATIENT)
Dept: INTERNAL MEDICINE CLINIC | Facility: CLINIC | Age: 53
End: 2023-12-14
Payer: COMMERCIAL

## 2023-12-14 VITALS
SYSTOLIC BLOOD PRESSURE: 122 MMHG | HEIGHT: 67 IN | DIASTOLIC BLOOD PRESSURE: 82 MMHG | HEART RATE: 110 BPM | WEIGHT: 201 LBS | RESPIRATION RATE: 16 BRPM | BODY MASS INDEX: 31.55 KG/M2

## 2023-12-14 DIAGNOSIS — E66.9 OBESITY (BMI 30-39.9): ICD-10-CM

## 2023-12-14 DIAGNOSIS — K76.0 FATTY LIVER: ICD-10-CM

## 2023-12-14 DIAGNOSIS — F43.9 STRESS: ICD-10-CM

## 2023-12-14 DIAGNOSIS — F41.9 ANXIETY: ICD-10-CM

## 2023-12-14 DIAGNOSIS — E78.00 PURE HYPERCHOLESTEROLEMIA: ICD-10-CM

## 2023-12-14 DIAGNOSIS — E11.8 TYPE 2 DIABETES MELLITUS WITH COMPLICATION, WITHOUT LONG-TERM CURRENT USE OF INSULIN (HCC): ICD-10-CM

## 2023-12-14 DIAGNOSIS — Z51.81 THERAPEUTIC DRUG MONITORING: Primary | ICD-10-CM

## 2023-12-14 PROCEDURE — 3079F DIAST BP 80-89 MM HG: CPT | Performed by: NURSE PRACTITIONER

## 2023-12-14 PROCEDURE — 99214 OFFICE O/P EST MOD 30 MIN: CPT | Performed by: NURSE PRACTITIONER

## 2023-12-14 PROCEDURE — 3074F SYST BP LT 130 MM HG: CPT | Performed by: NURSE PRACTITIONER

## 2023-12-14 PROCEDURE — 3008F BODY MASS INDEX DOCD: CPT | Performed by: NURSE PRACTITIONER

## 2023-12-14 RX ORDER — SEMAGLUTIDE 1.7 MG/.75ML
1.7 INJECTION, SOLUTION SUBCUTANEOUS WEEKLY
Qty: 9 ML | Refills: 0 | Status: SHIPPED | OUTPATIENT
Start: 2023-12-14

## 2023-12-14 NOTE — PATIENT INSTRUCTIONS
Next steps:  1. Fill your prescribed medication and take as discussed and prescribed: wegovy 1.7mg weekly   2. Schedule a personal nutrition consultation with one of our registered dieticians     Please try to work on the following dietary changes:  Daily protein recommendation to start:  grams  Daily carbohydrate: <115g  Daily calories: 1,400  1. Drink water with meals and throughout the day, cut down on soda and/or juice if consumed. Consider flavored water options like Bubbly, Spindrift, Hint and Francisco J. 2.  Eat breakfast daily and focus on having protein with each meal, examples include: greek yogurt, cottage cheese, hard boiled egg, whole grain toast with peanut butter. 3.  Reduce refined carbohydrates and sugars which includes items such as sweets, as well as rice, pasta, and bread and make sure to choose whole grain options when having them with just 1 serving per meal about the size of your inner palm. 4.  Consume non starchy veggies daily working towards making them a good 50% of your daily food intake. Add them to lunch and dinner consistently. 5.  Start a daily probiotic: VSL#3 is recommended, (order on line at www.vsl3. com). Take 1 capsule daily with water for 30 days, then reduce to 1 every other day (this will reduce the cost). Capsules can be left out for 2 weeks, but then must be refrigerated. Please download rocky My Fitness  Nip! Or Net Diary to monitor daily dietary intake and you will be able to see if you are eating the right amount of calories or too much or too little which would hinder weight loss. Additionally this will help to see your daily carbohydrate and protein intake. When you set the rocky up choose 1-2 lbs/week as a goal.  Keeping a paper food journal is an option as well to remain accountable for your choices- this is the start to mindful eating! A low calorie diet has been consistently shown to support weight loss.      Continue or start exercising to help establish a routine. If not already exercising begin with 1 day and progress as able with long-term goal of 30 minutes 5 days a week at a minimum. Meditation daily can help manage and control stress. Chronic stress can make weight loss difficult. Exercising is one way to help with stress, but meditation using the CALM Sara or another comparable alternative can be done in your home or place of work with little time commitment. This Sara can also help work on behavior change and improve sleep. Check out the segment under Calm Masterclass and listen to The 4 Pillars of Health. A great way to begin learning about the foundation of lifestyle with practical tips to use in your every day. Check out www.yourweightmatters. org blog for continued daily support and education along this weight loss journey! Patient Resources:     Personal Training/Fitness Classes/Health Coaching     [place] JEREMIE AUGUST and Alex Fortune @ [email or web address]/ Full fitness center with group fitness and personal training. Discount available as client of [address] [place] [address] Weight Management. Health Coaching and Personal Training with Gerda Tuttle at our [facility]- individual weekly coaching with option to add personal training and small group fitness classes targeted at weight loss- [phone] and/or email @ Jessica Eason. [email or web address]  360FIT [place] [email or web address] Group Fitness [phone] and/or email Eusebio Coffey at [email or web address]. [email or web address]  [address] [facility] with multiple locations: Aetna (www.La GuÃ­a del DÃ­a), Eat The Frog Fitness (www.MeetingSprout. TwoF), Fit Body Bootcamp (www.AdsItbodybootcamp.TwoF), Druva Fitness (www.Drink Up [place]. TwoF), The Exercise  (www.exercisecoach.TwoF)     Online Fitness  Fitness  on Whole Foods in 10 DVD series- www. jxtnr28YLL. TwoF  Sit and Be Fit - Chair exercise series Www.sitandbefit. org  Hip Hop Fit with Eladio Price at retickr     Apps for on the [employer] 7 Minute Workout (orange box with white 7) - free on the go HIIT training sara  Peloton Sara @ www. Santh CleanEnergy Microgrid     Nutrition Trackers and Tools  LoseIT! And My Fitness Pal apps and on line for tracking nutrition  NOOM - virtual health coaching  FitFoundation (healthy meals on the go) in [facility] @ www. ksvklwqipymoo0k. Phil Reese MD @ www.pMediaNetwork and Brittany Steward (keto and low carb plans recommended) @ www. LGUFAX48.WST, Metabolic Meals @ www. Magnolia BroadbandMetabolicMeals. com - individual prepared meals to go  Invisible Connect, NeoGuide Systems, International Business Machines, Every Plate, Cytocentrics- on line meal delivery programs for preparation at home  [employer] in [place] for homemade meals to go @ www.Busca Corp. ASSURED INFORMATION SECURITY  Diet Doctor @ www. dietdoctor. ASSURED INFORMATION SECURITY - low carb swaps  Yummly - meal prep and planning sara (www.yummly. com)     Stress Management/Behavior/Mindful Eating  CALM meditation sara (www.ActBlue)  Headspace  Am I Hungry? Mindful eating virtual  sara  Www.yourweightmatters. org - Obesity Action Coalition sponsored Blog posts daily  Motivation sara (black box with white \")- daily supportive messages sent to your phone     Books/Video Education/Podcasts  Mindless Eating by Kinga Youngblood  Why We Get Sick by Alla Edgar (a book about insulin resistance)  Atomic Habits by Shiraz Bundy (a book about taking small steps to promote greater behavior change)   Can't Hurt Me by Nikki Box (a book exploring the power of discipline in achieving your goals)  The End of Dieting: How to Live for Life by Dr. Rich Bansal M.D. or listen to The [address] Episode 61: Understanding \"Nutritarian\" Eating w/Dr. Rich Bansal  Your Body in Balance: The World [employer] Corporation of Food, Hormones, and Health by Dr. Howie Campbell  The Menopause Diet Plan by Irene Parks and [facility] AT [facility]  The Complete Guide to fasting by Dr. Harshal Mejias, [address] [facility] by Fabiano Streeter, Ph.D, R.D.   Weight Loss Surgery Will Not Treat Food Addiction by Maribel Theodore Ph.D  The [address] [place] [address] on plant based nutrition  Fed Up - documentary about obesity (Free on New BioCuritytown)  The Truth About Sugar - documentary on sugar (Free on Utube, https://youRumbleTalku. be/4E8syrgGB1m)  The Dr. Chelsy Urbina by Dr. Farhan Concepcion MD  Fitlosophy Fitspiration - journal to better health (found at [employer] in fitness aisle)  What Happened to You?- a look at the impact trauma has on behavior written by Andrea Lackey and Dr. Brian Mejía Again by [employer]. - discovering your true self after trauma  Georges Kathleen talk on Spruik, The Call to Courage  Podcasts: The Exam Room by the Physician's Committee, Nutrition Facts by Dr. Yudelka Camara    We are here to support you with weight loss, but please remember that you still need your primary care provider for your routine health maintenance.

## 2024-03-11 ENCOUNTER — PATIENT MESSAGE (OUTPATIENT)
Dept: INTERNAL MEDICINE CLINIC | Facility: CLINIC | Age: 54
End: 2024-03-11

## 2024-03-15 ENCOUNTER — TELEMEDICINE (OUTPATIENT)
Dept: INTERNAL MEDICINE CLINIC | Facility: CLINIC | Age: 54
End: 2024-03-15
Payer: COMMERCIAL

## 2024-03-15 DIAGNOSIS — R73.03 PREDIABETES: ICD-10-CM

## 2024-03-15 DIAGNOSIS — M25.551 HIP PAIN, CHRONIC, RIGHT: ICD-10-CM

## 2024-03-15 DIAGNOSIS — K76.0 FATTY LIVER: ICD-10-CM

## 2024-03-15 DIAGNOSIS — F41.9 ANXIETY: ICD-10-CM

## 2024-03-15 DIAGNOSIS — Z51.81 THERAPEUTIC DRUG MONITORING: Primary | ICD-10-CM

## 2024-03-15 DIAGNOSIS — E78.00 PURE HYPERCHOLESTEROLEMIA: ICD-10-CM

## 2024-03-15 DIAGNOSIS — F43.9 STRESS: ICD-10-CM

## 2024-03-15 DIAGNOSIS — G89.29 HIP PAIN, CHRONIC, RIGHT: ICD-10-CM

## 2024-03-15 DIAGNOSIS — E66.9 OBESITY (BMI 30-39.9): ICD-10-CM

## 2024-03-15 PROCEDURE — 99213 OFFICE O/P EST LOW 20 MIN: CPT | Performed by: NURSE PRACTITIONER

## 2024-03-15 RX ORDER — TIRZEPATIDE 5 MG/.5ML
5 INJECTION, SOLUTION SUBCUTANEOUS WEEKLY
Qty: 2 ML | Refills: 1 | Status: SHIPPED | OUTPATIENT
Start: 2024-03-15

## 2024-03-15 NOTE — PROGRESS NOTES
[facility] WEIGHT MANAGEMENT VIRTUAL ENCOUNTER     Ngozi Mathew verbally consents to a Virtual/Telephone Check-In service on 03/15/24   Patient understands and accepts financial responsibility for any deductible, co-insurance and/or co-pays associated with this service.    HISTORY OF PRESENT ILLNESS  Chief Complaint   Patient presents with    Other     F/u on weight management      Ngozi Mathew is a 53 year old female is being evaluated as a video visit using Telemedicine with live, interactive video and audio    Weight gain/loss since LOV based on home monitoring:   Home scale:  201  Has lost  #0 lbs since LOV 3 months ago     Compliance with medication: wegovy 1.7mg weekly   Tolerating well, doesn't feel like it's helping with decreasing appetite and no side effects     Ideally would like to switch over to zepbound   Success: no gain over holidays   Challenging: not really losing much   Exercise/Activity: 4-5x/ week, via walking, has been trying to do more strength training exercises (leg, core and arms)   Nutrition: eating regular meals, +protein, minimal veggies. +interm tracking reports  Stress is manageable   Sleep: 8 hours/night, waking up feeling rested    Denies chest pain, shortness of breath, dizziness, blurred vision, headache, paresthesia, nausea/vomiting.     Wt Readings from Last 6 Encounters:   12/14/23 201 lb (91.2 kg)   12/07/23 207 lb (93.9 kg)   11/12/23 201 lb (91.2 kg)   11/07/23 190 lb (86.2 kg)   10/30/23 200 lb 6.4 oz (90.9 kg)   07/10/23 206 lb 12.8 oz (93.8 kg)          Subjective  REVIEW OF SYSTEMS  GENERAL HEALTH: feels well otherwise, denied any fevers chills or night sweats   RESPIRATORY: denies shortness of breath   CARDIOVASCULAR: denies chest pain  GI: denies abdominal pain  PSYCH: denies any mood changes    Objective  EXAM  Reviewed most recent set of vitals   Physical Exam:  GENERAL: well developed, well nourished, in no apparent distress, speaking in full sentences  comfortably   SKIN: warm, pink, dry without rashes to exposed area   EYES: conjunctiva pink  HEENT: atraumatic, normocephalic  LUNGS: normal work of breathing, non labored  CARDIO: normal work, no exertion  EXTREMITIES: no cyanosis, no clubbing, no edema  NEURO: Oriented times three  PSYCH: pleasant, cooperative, normal mood and affect    Lab Results   Component Value Date    WBC 12.6 (H) 12/07/2023    RBC 4.45 12/07/2023    HGB 13.2 12/07/2023    HCT 41.6 12/07/2023    MCV 93.5 12/07/2023    MCH 29.7 12/07/2023    MCHC 31.7 12/07/2023    RDW 13.5 12/07/2023    .0 12/07/2023     Lab Results   Component Value Date     (H) 11/12/2023    BUN 17 11/12/2023    BUNCREA 16.0 06/01/2021    CREATSERUM 1.06 (H) 11/12/2023    ANIONGAP 5 11/12/2023    GFRNAA 74 04/16/2022    GFRAA 85 04/16/2022    CA 9.0 11/12/2023    OSMOCALC 288 11/12/2023    ALKPHO 97 11/12/2023    AST 9 (L) 11/12/2023    ALT 24 11/12/2023    BILT 0.6 11/12/2023    TP 8.0 11/12/2023    ALB 3.8 11/12/2023    GLOBULIN 4.2 11/12/2023     11/12/2023    K 4.0 11/12/2023     11/12/2023    CO2 26.0 11/12/2023     Lab Results   Component Value Date     12/07/2023    A1C 6.0 (H) 12/07/2023     Lab Results   Component Value Date    CHOLEST 240 (H) 12/07/2023    TRIG 127 12/07/2023    HDL 50 12/07/2023     (H) 12/07/2023    VLDL 25 12/07/2023    TCHDLRATIO 4.9 (H) 06/21/2013    NONHDLC 190 (H) 12/07/2023     Lab Results   Component Value Date    T4F 0.9 10/06/2021    TSH 1.370 05/26/2023     Lab Results   Component Value Date    B12 1,194 (H) 12/07/2023     Lab Results   Component Value Date    VITD 51.5 08/31/2020       Current Outpatient Medications on File Prior to Visit   Medication Sig Dispense Refill    semaglutide-weight management (WEGOVY) 1.7 MG/0.75ML Subcutaneous Solution Auto-injector Inject 0.75 mL (1.7 mg total) into the skin once a week. 9 mL 0    azelastine 0.1 % Nasal Solution 2 sprays by Nasal route 2 (two)  times daily as needed. 90 mL 3    semaglutide-weight management (WEGOVY) 1.7 MG/0.75ML Subcutaneous Solution Auto-injector Inject 0.75 mL (1.7 mg total) into the skin once a week. (Patient not taking: Reported on 12/14/2023) 3 mL 1    metFORMIN 500 MG Oral Tab Take 1 tablet (500 mg total) by mouth 2 (two) times daily with meals. 180 tablet 3    estradiol 0.5 MG Oral Tab Take 1 tablet (0.5 mg total) by mouth daily. 90 tablet 3    liothyronine 5 MCG Oral Tab Take 1 tablet (5 mcg total) by mouth daily. 90 tablet 3    AUVI-Q 0.3 MG/0.3ML Injection Solution Auto-injector Inject 0.3 mL (1 each total) as directed as needed. 1 each 3    Cholecalciferol (VITAMIN D) 1000 UNITS Oral Tab Take 1,000 Units by mouth daily.        Acidophilus/Pectin Oral Cap Take 1 capsule by mouth daily.       No current facility-administered medications on file prior to visit.       ASSESSMENT  Analyzed weight data:       Diagnoses and all orders for this visit:    Therapeutic drug monitoring  -     Tirzepatide-Weight Management (ZEPBOUND) 5 MG/0.5ML Subcutaneous Solution Auto-injector; Inject 5 mg into the skin once a week.    Obesity (BMI 30-39.9)  -     Tirzepatide-Weight Management (ZEPBOUND) 5 MG/0.5ML Subcutaneous Solution Auto-injector; Inject 5 mg into the skin once a week.    Anxiety    Stress    Pure hypercholesterolemia    Prediabetes    Fatty liver    Hip pain, chronic, right      PLAN  Will stop medications: wegovy 1.7mg weekly  Will trial medications: Zepbound 5mg weekly (is paying out of pocket)   Will continue with medications: metformin 500mg bid from PCP   --advised of side effects and adverse effects of this medication  Contradictions: has done ozempic in the past without good results, phentermine    Reviewed labs  HLD stable on current medication regimen, continue to follow-up with PCP  Fatty liver, lifestyle education given  DM type 2, reviewed last a1c 6.0% on 12/2023-  with continue with metformin  Continue with physical  activity and exercise   Wrote out macros and encouraged tracking food   Advised to monitor blood pressure and pulse at home/ given parameters to review and contact provider.  Nutrition: low carb diet/ recommended to eat breakfast daily/ regular protein intake  Follow up with dietitian and psychologist as recommended.  Discussed the role of sleep and stress in weight management.  Counseled on comprehensive weight loss plan including attention to nutrition, exercise and behavior/stress management for success. See patient instruction below for more details.  Discussed strategies to overcome barriers to successful weight loss and weight maintenance  FITTE: ACSM recommendations (150-300 minutes/ week in active weight loss)       There are no Patient Instructions on file for this visit.    No follow-ups on file.    Patient verbalizes understanding.    Total time spent on chart review, pre-charting, obtaining history, counseling, and educating, reviewing labs was 23 minutes.       Pt understands phone/video evaluation is not a substitute for face to face examination or emergency care. Pt advised to go to the ER or call 911 for worsening symptoms or acute distress.       Please note that the following visit was completed using two-way, real-time interactive audio and/or video communication.  This has been done in good terra to provide continuity of care in the best interest of the provider-patient relationship, due to the ongoing public health crisis/national emergency and because of restrictions of visitation.  There are limitations of this visit as no physical exam could be performed.  Every conscious effort was taken to allow for sufficient and adequate time.  This billing was spent on reviewing labs, medications, radiology tests and decision making.  Appropriate medical decision-making and tests are ordered as detailed in the plan of care above.     NOTE TO PATIENT: The 21st Century Cures Act makes clinical notes like  these available to patients in the interest of transparency. Clinical notes are medical documents used by physicians and care providers to communicate with each other. These documents include medical language and terminology, abbreviations, and treatment information that may sound technical and at times possibly unfamiliar. In addition, at times, the verbiage may appear blunt or direct. These documents are one tool providers use to communicate relevant information and clinical opinions of the care providers in a way that allows common understanding of the clinical context.     Aby Palacios, APRN  3/15/2024

## 2024-03-15 NOTE — PATIENT INSTRUCTIONS
Next steps:  1.  Fill your prescribed medication and take as discussed and prescribed: stop wegovy  Will trial zepbound 5mg weekly x 4 weeks and then increase if tolerated  2.  Schedule a personal nutrition consultation with one of our registered dieticians     Please try to work on the following dietary changes:  Daily protein recommendation to start:  grams  Daily carbohydrate: <115g  Daily calories: 1,400  1.  Drink water with meals and throughout the day, cut down on soda and/or juice if consumed. Consider flavored water options like Bubbly, Spindrift, Hint and Francisco J.  2.  Eat breakfast daily and focus on having protein with each meal, examples include: greek yogurt, cottage cheese, hard boiled egg, whole grain toast with peanut butter.   3.  Reduce refined carbohydrates and sugars which includes items such as sweets, as well as rice, pasta, and bread and make sure to choose whole grain options when having them with just 1 serving per meal about the size of your inner palm.  4.  Consume non starchy veggies daily working towards making them a good 50% of your daily food intake. Add them to lunch and dinner consistently.  5.  Start a daily probiotic: VSL#3 is recommended, (order on line at www.vsl3.com). Take 1 capsule daily with water for 30 days, then reduce to 1 every other day (this will reduce the cost). Capsules can be left out for 2 weeks, but then must be refrigerated.      Please download rocky My Fitness Pal, LoseIt! Or Net Diary to monitor daily dietary intake and you will be able to see if you are eating the right amount of calories or too much or too little which would hinder weight loss. Additionally this will help to see your daily carbohydrate and protein intake. When you set the rocky up choose 1-2 lbs/week as a goal.  Keeping a paper food journal is an option as well to remain accountable for your choices- this is the start to mindful eating! A low calorie diet has been consistently shown to  support weight loss.     Continue or start exercising to help establish a routine. If not already exercising begin with 1 day and progress as able with long-term goal of 30 minutes 5 days a week at a minimum.     Meditation daily can help manage and control stress. Chronic stress can make weight loss difficult.  Exercising is one way to help with stress, but meditation using the CALM Sara or another comparable alternative can be done in your home or place of work with little time commitment. This Sara can also help work on behavior change and improve sleep. Check out the segment under Calm Masterclass and listen to The 4 Pillars of Health. A great way to begin learning about the foundation of lifestyle with practical tips to use in your every day.     Check out www.yourweightmatters.org blog for continued daily support and education along this weight loss journey!    Patient Resources:     Personal Training/Fitness Classes/Health Coaching     [facility] @ [email or web address] Full fitness center with group fitness and personal training. Discount available as client of [facility].  Health Coaching and Personal Training with Yeimi Pablo at our [facility]- individual weekly coaching with option to add personal training and small group fitness classes targeted at weight loss- [phone] and/or email @ [email or web address]  360FIT [place] http://www.[email or web address] Express. Group Fitness [phone] and/or email Kaylah at [email or web address]  [facility] Hospit[facility] Fitness Centers with multiple locations: [employer] (www.Voucherlink), Eat The Frog Fitness (www.Visualant.Carbonite), Fit Body Bootcamp (www.Banjobodybootcamp.com), The [place] Fitness (www.Wedding Party.Carbonite), The Exercise  (www.exercisecoach.Carbonite)     Online Fitness  Fitness  on Nutrinia  Fit in 10 DVD series- www.ythap05AMT.Carbonite  Sit  and Be Fit - Chair exercise series Www.sitandbefit.org  Hip Hop Fit with Eladio Price at www.hiphopfit.net     Apps for on the Go Fitness  [place] 7 Minute Workout (orange box with white 7) - free on the go HIIT training sara  Peloton Sara @ www.onepeloton.com     Nutrition Trackers and Tools  LoseIT! And My Fitness Pal apps and on line for tracking nutrition  NOOM - virtual health coaching  FitFoundation (healthy meals on the go) in [place] @ www.wgjjdbygnnoug7qSpiral Gateway  Christopher CROSS @ wwwInstapagarbistromd.com and Wqiqbu99 (keto and low carb plans recommended) @ www.ichglz57.com, Metabolic Meals @ www.Applied Immune TechnologiesMetabolicMeals.Shutter Guardian - individual prepared meals to go  Gobble, Blue Apron, Home , Every Plate, Sunbasket- on line meal delivery programs for preparation at home  [place] in [place] for homemade meals to go @ www.mealMoosejaw Mountaineering and Backcountry Travelage.Shutter Guardian  Diet Doctor @ www.dietdoctor.Shutter Guardian - low carb swaps  Yummly - meal prep and planning sara (www.yummly.com)     Stress Management/Behavior/Mindful Eating  CALM meditation sara (www.calm.com)  Headspace  Am I Hungry? Mindful eating virtual  sara  Www.yourweightmatters.org - Obesity Action Coalition sponsored Blog posts daily  Motivation sara (black box with white \")- daily supportive messages sent to your phone     Books/Video Education/Podcasts  Mindless Eating by Mayank Bruno  Why We Get Sick by JuanM Esquivel (a book about insulin resistance)  Atomic Habits by David Lawson (a book about taking small steps to promote greater behavior change)   Can't Hurt Me by Waldo Burgos (a book exploring the power of discipline in achieving your goals)  The End of Dieting: How to Live for Life by Dr. Darwin Gonzalez M.D. or listen to The iexerci.se Podcast Episode 63: Understanding \"Nutritarian\" Eating w/Dr. Darwin Gonzalez  Your Body in Balance: The New Science of Food, Hormones, and Health by Dr. Sanchez Elena  The Menopause Diet Plan by Rebecca Ahmadi and Lena Perez  The Complete Guide to fasting by   Dimple  Sugar, Salt & Fat by Kennedi Nielsen, Ph.D, R.D.  Weight Loss Surgery Will Not Treat Food Addiction by Yessi Canada Ph.D  The Game Changers- Molecular Detectionix Documentary on plant based nutrition  Fed Up - documentary about obesity (Free on Utube)  The Truth About Sugar - documentary on sugar (Free on Utube, https://youtu.be/6D9ljceSP0j)  The Dr. Funes T5 Wellness Plan by Dr. Paulo Funes MD  Fitlosophy Fitspiration - journal to better health (found at [employer] in fitness aisle)  What Happened to You?- a look at the impact trauma has on behavior written by Lisa Auguste and Dr. Juvenal Paz  Whole Again by Forrest Nguyễn - discovering your true self after trauma  Dorene Robbins talk on BandPage, The Call to Courage  Podcasts: The Exam Room by the Physician's Committee, Nutrition Facts by Dr. Ram    We are here to support you with weight loss, but please remember that you still need your primary care provider for your routine health maintenance.

## 2024-04-03 ENCOUNTER — TELEPHONE (OUTPATIENT)
Dept: INTERNAL MEDICINE CLINIC | Facility: CLINIC | Age: 54
End: 2024-04-03

## 2024-04-03 NOTE — TELEPHONE ENCOUNTER
Pt left voicemail concern with b/o of Zepbound  She was due for a dose of any GLP-1 on Monday and was told it may be several weeks to receive shipment at her pharmacy.  This is regarding zepbound  Patient has been on wegovy 1.7 mg weekly and trying to switch to zepbound 5 mg - but not in stock.  She is going to check other pharmacies.  In the meantime, do you have any other recommendations for her?    I asked if wegovy was controlling her appetite and she said she does not overeat and does not really need that control. (I was wondering about continuing wegovy but going to 2.4 mg).

## 2024-04-05 NOTE — TELEPHONE ENCOUNTER
She can go increase wegovy 2.4mg (if she would like). Regarding zepbound, not sure when the shortage it is going to improve (if she wants to do a lower dose) and the pharmacy has it- we can send that in.

## 2024-04-11 ENCOUNTER — LAB ENCOUNTER (OUTPATIENT)
Dept: LAB | Age: 54
End: 2024-04-11
Attending: INTERNAL MEDICINE
Payer: COMMERCIAL

## 2024-04-11 ENCOUNTER — OFFICE VISIT (OUTPATIENT)
Dept: INTERNAL MEDICINE CLINIC | Facility: CLINIC | Age: 54
End: 2024-04-11
Payer: COMMERCIAL

## 2024-04-11 VITALS
SYSTOLIC BLOOD PRESSURE: 128 MMHG | HEIGHT: 67 IN | HEART RATE: 86 BPM | TEMPERATURE: 98 F | BODY MASS INDEX: 32.24 KG/M2 | OXYGEN SATURATION: 97 % | WEIGHT: 205.38 LBS | RESPIRATION RATE: 14 BRPM | DIASTOLIC BLOOD PRESSURE: 82 MMHG

## 2024-04-11 DIAGNOSIS — R53.83 OTHER FATIGUE: ICD-10-CM

## 2024-04-11 DIAGNOSIS — E66.9 CLASS 1 OBESITY WITHOUT SERIOUS COMORBIDITY WITH BODY MASS INDEX (BMI) OF 32.0 TO 32.9 IN ADULT, UNSPECIFIED OBESITY TYPE: ICD-10-CM

## 2024-04-11 DIAGNOSIS — E55.9 VITAMIN D DEFICIENCY: ICD-10-CM

## 2024-04-11 DIAGNOSIS — R53.83 OTHER FATIGUE: Primary | ICD-10-CM

## 2024-04-11 DIAGNOSIS — F41.9 ANXIETY: ICD-10-CM

## 2024-04-11 DIAGNOSIS — E53.8 VITAMIN B12 DEFICIENCY: ICD-10-CM

## 2024-04-11 DIAGNOSIS — E11.8 TYPE 2 DIABETES MELLITUS WITH COMPLICATION, WITHOUT LONG-TERM CURRENT USE OF INSULIN (HCC): ICD-10-CM

## 2024-04-11 DIAGNOSIS — H69.91 ACUTE DYSFUNCTION OF RIGHT EUSTACHIAN TUBE: ICD-10-CM

## 2024-04-11 LAB
BASOPHILS # BLD AUTO: 0.12 X10(3) UL (ref 0–0.2)
BASOPHILS NFR BLD AUTO: 1 %
DEPRECATED HBV CORE AB SER IA-ACNC: 77.1 NG/ML
DEPRECATED RDW RBC AUTO: 46.5 FL (ref 35.1–46.3)
EOSINOPHIL # BLD AUTO: 0.18 X10(3) UL (ref 0–0.7)
EOSINOPHIL NFR BLD AUTO: 1.5 %
ERYTHROCYTE [DISTWIDTH] IN BLOOD BY AUTOMATED COUNT: 13.9 % (ref 11–15)
HCT VFR BLD AUTO: 40.4 %
HGB BLD-MCNC: 13.7 G/DL
IMM GRANULOCYTES # BLD AUTO: 0.04 X10(3) UL (ref 0–1)
IMM GRANULOCYTES NFR BLD: 0.3 %
LYMPHOCYTES # BLD AUTO: 3.95 X10(3) UL (ref 1–4)
LYMPHOCYTES NFR BLD AUTO: 32.4 %
MCH RBC QN AUTO: 30.9 PG (ref 26–34)
MCHC RBC AUTO-ENTMCNC: 33.9 G/DL (ref 31–37)
MCV RBC AUTO: 91 FL
MONOCYTES # BLD AUTO: 0.69 X10(3) UL (ref 0.1–1)
MONOCYTES NFR BLD AUTO: 5.7 %
NEUTROPHILS # BLD AUTO: 7.21 X10 (3) UL (ref 1.5–7.7)
NEUTROPHILS # BLD AUTO: 7.21 X10(3) UL (ref 1.5–7.7)
NEUTROPHILS NFR BLD AUTO: 59.1 %
PLATELET # BLD AUTO: 348 10(3)UL (ref 150–450)
RBC # BLD AUTO: 4.44 X10(6)UL
VIT B12 SERPL-MCNC: 1403 PG/ML (ref 211–911)
VIT D+METAB SERPL-MCNC: 38.7 NG/ML (ref 30–100)
WBC # BLD AUTO: 12.2 X10(3) UL (ref 4–11)

## 2024-04-11 PROCEDURE — 3074F SYST BP LT 130 MM HG: CPT | Performed by: INTERNAL MEDICINE

## 2024-04-11 PROCEDURE — 3008F BODY MASS INDEX DOCD: CPT | Performed by: INTERNAL MEDICINE

## 2024-04-11 PROCEDURE — 82728 ASSAY OF FERRITIN: CPT | Performed by: INTERNAL MEDICINE

## 2024-04-11 PROCEDURE — 3072F LOW RISK FOR RETINOPATHY: CPT | Performed by: INTERNAL MEDICINE

## 2024-04-11 PROCEDURE — 82607 VITAMIN B-12: CPT | Performed by: INTERNAL MEDICINE

## 2024-04-11 PROCEDURE — 82306 VITAMIN D 25 HYDROXY: CPT | Performed by: INTERNAL MEDICINE

## 2024-04-11 PROCEDURE — 96127 BRIEF EMOTIONAL/BEHAV ASSMT: CPT | Performed by: INTERNAL MEDICINE

## 2024-04-11 PROCEDURE — 85025 COMPLETE CBC W/AUTO DIFF WBC: CPT | Performed by: INTERNAL MEDICINE

## 2024-04-11 PROCEDURE — 3079F DIAST BP 80-89 MM HG: CPT | Performed by: INTERNAL MEDICINE

## 2024-04-11 PROCEDURE — 99214 OFFICE O/P EST MOD 30 MIN: CPT | Performed by: INTERNAL MEDICINE

## 2024-04-11 RX ORDER — ALPRAZOLAM 0.25 MG/1
0.25 TABLET ORAL NIGHTLY PRN
Qty: 20 TABLET | Refills: 0 | Status: SHIPPED | OUTPATIENT
Start: 2024-04-11

## 2024-04-11 NOTE — PATIENT INSTRUCTIONS
Please have blood work done    Try over-the-counter Flonase every day for 1 to 2 weeks to see if that helps with your vertigo.  Continue the allergy medicine    I have refilled Xanax for you    You are a diabetic however you are well-controlled.  You could consider Mounjaro in the future.

## 2024-04-11 NOTE — PROGRESS NOTES
Patient Office Visit    ASSESSMENT AND PLAN:   1. Other fatigue  Note: Could be caregiver fatigue.  Will obtain testing as below  - CBC W Differential W Platelet [E]; Future  - Ferritin [E]; Future    2. Vitamin B12 deficiency  Note: Continue vitamin B12 supplement  - Vitamin B12; Future    3. Vitamin D deficiency  Continue vitamin D supplement  - Vitamin D; Future    4. Anxiety  Small amount of refill provided for flights  - ALPRAZolam (XANAX) 0.25 MG Oral Tab; Take 1 tablet (0.25 mg total) by mouth nightly as needed for Anxiety.  Dispense: 20 tablet; Refill: 0    5. Type 2 diabetes mellitus with complication, without long-term current use of insulin (HCC)  Note: Discussed with patient that she is a diabetic however it is well controlled.  She can consider Mounjaro in the future as it is covered for diabetics.    6. Acute dysfunction of right eustachian tube  Note: Recommended to try Flonase over-the-counter for 1 to 2 weeks to see if that helps.  Continue with antihistamine    7. Class 1 obesity without serious comorbidity with body mass index (BMI) of 32.0 to 32.9 in adult, unspecified obesity type  Note: Continue zepbound and follows with the weight loss clinic    Return to clinic in 3 months for routine follow-up    Patient/Caregiver Education: Patient/Caregiver Education: There are no barriers to learning. Medical education done. Outcome: Patient verbalizes understanding. Patient is notified to call with any questions, complications, allergies, or worsening or changing symptoms.  Patient is to call with any side effects or complications from the treatments as a result of today.      Reviewed Past Medical History and   Patient Active Problem List   Diagnosis    Type 2 diabetes mellitus with complication, without long-term current use of insulin (HCC)    S/P laparoscopic hysterectomy- fibroids 11/2018    Class 1 obesity without serious comorbidity in adult    Fatty liver    Hip pain, chronic, right    Estrogen  replacement therapy (ERT) - started 2020 age 49    Mixed hyperlipidemia    Family history of ovarian cancer - sister not familial     Internal hemorrhoids    External hemorrhoids without complication       Orders Placed This Encounter   Procedures    Vitamin B12     Standing Status:   Future     Standing Expiration Date:   4/11/2025    Vitamin D     Standing Status:   Future     Standing Expiration Date:   4/11/2025     Order Specific Question:   Please pick the scenario that best fits the purpose for ordering this test     Answer:   General Screening/Vit D deficiency (25-Hydroxy)     Order Specific Question:   Release to patient     Answer:   Immediate    CBC W Differential W Platelet [E]     Standing Status:   Future     Standing Expiration Date:   4/11/2025     Order Specific Question:   Release to patient     Answer:   Immediate    Ferritin [E]     Standing Status:   Future     Standing Expiration Date:   4/11/2025     Order Specific Question:   Release to patient     Answer:   Immediate     Requested Prescriptions     Signed Prescriptions Disp Refills    ALPRAZolam (XANAX) 0.25 MG Oral Tab 20 tablet 0     Sig: Take 1 tablet (0.25 mg total) by mouth nightly as needed for Anxiety.         Bisi Pang DO  CC:  Chief Complaint   Patient presents with    Follow - Up         HPI:   Ngozi Mathew is a 53 year old female who presents for the following concerns    Fatigue: has been going on for the past few months. She is not sure if her iron is low. She would like to get tested.  She has been taking care of her parents and her mom has severe Alzheimer's dementia.  Anxiety: She will be flying to [place] and gets very anxious during flights.  She would like a small amount of Xanax  Obesity/diabetes: She has been taking zepbound out-of-pocket as it has not been approved through her insurance.  Her endocrinologist was not sure if she is diabetic even though she has had A1c above 6.5 several times in 2016 and  2017  Vertigo: She had some slight dizziness a few weeks ago.  She started taking over-the-counter Xyzal and nasal spray which helped.  She feels that she has some congestion in the right side    Past Medical History:    Abdominal distention    Allergic rhinitis    Anemia    Bloating    Body piercing    Brachial neuritis or radiculitis NOS    Diabetes mellitus (HCC)    Disorder of thyroid    Dizziness and giddiness    Fatigue    Fibroid uterus    Fibroids    Fibromyalgia    Frequency of urination    Heartburn    Hypothyroidism    Menorrhagia    Multiple thyroid nodules    Other abnormal glucose    Other and unspecified hyperlipidemia    Other malaise and fatigue    Peripheral vertigo, unspecified    Prediabetes    Seasonal allergies    Vertigo    (on and off for 10 yrs)    Visual impairment    contact lenses    Wears glasses       Past Surgical History:   Procedure Laterality Date    Colonoscopy      Hysterectomy  2018          Other surgical history  06/10/2021    removal of lipoma on back     Other surgical history  2022    sinus & nasal surgery    Skin surgery      Vaginal hysterectomy N/A 2018    Procedure: VAGINAL HYSTERECTOMY;  Surgeon: Zeyad Kiran MD;  Location:  [facility] OR       Social History:  Social History     Socioeconomic History    Marital status:    Tobacco Use    Smoking status: Former     Current packs/day: 0.00     Average packs/day: 0.3 packs/day for 15.0 years (3.8 ttl pk-yrs)     Types: Cigarettes     Start date: 1989     Quit date: 2004     Years since quittin.2    Smokeless tobacco: Never   Vaping Use    Vaping status: Never Used   Substance and Sexual Activity    Alcohol use: Yes     Comment: Less than one per week    Drug use: No    Sexual activity: Yes     Partners: Male     Birth control/protection: Hysterectomy   Other Topics Concern    Caffeine Concern No    Stress Concern No    Weight Concern Yes    Special Diet No    Exercise Yes     Seat Belt Yes     Social Determinants of Health      Received [facility], [facility]    Social Connections    Received from [facility], [facility]    Housing Stability     Family History:  Family History   Problem Relation Age of Onset    Diabetes Sister     Ovarian Cancer Sister     Other (ovarian cancer) Sister     Asthma Sister     Stroke Other     Cancer Other     Diabetes Father     Hypertension Father     Other (thyroid disease) Mother     Other (lymphoma) Mother     Dementia Mother     No Known Problems Sister     Stroke Paternal Grandmother     Asthma Son      Allergies:  Allergies   Allergen Reactions    Wasps SWELLING    Coffee Bean Extract [Coffea Arabica] OTHER (SEE COMMENTS)     Per allergy testing    [place] UNKNOWN     Swelling, inflammation, SOB    Erythromycin OTHER (SEE COMMENTS)     Abdominal pain    Other OTHER (SEE COMMENTS)     Cayenne pepper- SOB    Pcn [Penicillins] REACTIVE AIRWAY DISEASE    Pork Derived Products OTHER (SEE COMMENTS)     GI upset     Current Meds:  Current Outpatient Medications on File Prior to Visit   Medication Sig Dispense Refill    ESTRADIOL 0.5 MG Oral Tab TAKE 1 TABLET BY MOUTH ONCE DAILY 90 tablet 0    Tirzepatide-Weight Management (ZEPBOUND) 5 MG/0.5ML Subcutaneous Solution Auto-injector Inject 5 mg into the skin once a week. 2 mL 1    azelastine 0.1 % Nasal Solution 2 sprays by Nasal route 2 (two) times daily as needed. 90 mL 3    metFORMIN 500 MG Oral Tab Take 1 tablet (500 mg total) by mouth 2 (two) times daily with meals. 180 tablet 3    liothyronine 5 MCG Oral Tab Take 1 tablet (5 mcg total) by mouth daily. 90 tablet 3    AUVI-Q 0.3 MG/0.3ML Injection Solution Auto-injector Inject 0.3 mL (1 each total) as directed as needed. 1 each 3    Cholecalciferol (VITAMIN D) 1000 UNITS Oral Tab Take 1,000 Units by mouth daily.        Acidophilus/Pectin Oral Cap Take 1 capsule by mouth  daily.      semaglutide-weight management (WEGOVY) 1.7 MG/0.75ML Subcutaneous Solution Auto-injector Inject 0.75 mL (1.7 mg total) into the skin once a week. 9 mL 0    semaglutide-weight management (WEGOVY) 1.7 MG/0.75ML Subcutaneous Solution Auto-injector Inject 0.75 mL (1.7 mg total) into the skin once a week. (Patient not taking: Reported on 12/14/2023) 3 mL 1     No current facility-administered medications on file prior to visit.         REVIEW OF SYSTEMS   Constitutional: no fatigue normal energy no weight changes   HENT: normal sinuses and no mouth issues   Eyes: . normal vision no eye pain   Respiratory: normal respirations no cough   Cardiovascular: no CP, or palpitations   Gastrointestinal: normal bowels and no abd pains   Genitourinary:  normal urination no hematuria, no frequency   Musculoskeletal: no pains in arms/legs, normal range of motion   Skin: no rashes or skin lesions that are new   Neurological:  no weakness, no numbness, normal gait   Hematological:  no bruises or bleeding   Psychiatric/Behavioral: normal mood no anxiety normal behavior     /82 (BP Location: Right arm, Patient Position: Sitting, Cuff Size: adult)   Pulse 86   Temp 97.5 °F (36.4 °C) (Temporal)   Resp 14   Ht 5' 7\" (1.702 m)   Wt 205 lb 6.4 oz (93.2 kg)   LMP 10/02/2018 (Approximate)   SpO2 97%   BMI 32.17 kg/m²     PHYSICAL EXAM:   Constitutional: Vital signs reviewed as noted, well developed, in no acute distress.   HENT: NCAT, slight fluid noted behind the right tympanic membrane  Eyes: pupils reactive bilaterally  Neck: No thyroidmegaly  Cardiovascular: nl s1 s2 no m/r/g  Pulmonary/Chest: CTA bilaterally with no wheezes  Extremities: no pedal edema   Neurological:  no weakness in UE and LE, reflexes are normal  Skin: Slight bruising noted in the right lower leg  Psychiatric:normal mood

## 2024-04-15 ENCOUNTER — PATIENT MESSAGE (OUTPATIENT)
Dept: INTERNAL MEDICINE CLINIC | Facility: CLINIC | Age: 54
End: 2024-04-15

## 2024-04-16 NOTE — TELEPHONE ENCOUNTER
From: Ngozi Mathew  To: Bisi Pang  Sent: 4/15/2024 3:04 PM CDT  Subject: Medication question    I am interested in a supplement, but want to make sure none d the ingredients will interfere with my medications, particularly estradiol and liothyronine. I have included a photo of the ingredient list.

## 2024-04-17 ENCOUNTER — OFFICE VISIT (OUTPATIENT)
Facility: CLINIC | Age: 54
End: 2024-04-17
Payer: COMMERCIAL

## 2024-04-17 VITALS
BODY MASS INDEX: 32.33 KG/M2 | SYSTOLIC BLOOD PRESSURE: 130 MMHG | DIASTOLIC BLOOD PRESSURE: 88 MMHG | WEIGHT: 206 LBS | HEIGHT: 67 IN

## 2024-04-17 DIAGNOSIS — Z79.890 HORMONE REPLACEMENT THERAPY (HRT): ICD-10-CM

## 2024-04-17 DIAGNOSIS — Z00.00 ANNUAL PHYSICAL EXAM: Primary | ICD-10-CM

## 2024-04-17 DIAGNOSIS — Z12.6 SCREENING FOR BLADDER CANCER: ICD-10-CM

## 2024-04-17 DIAGNOSIS — Z90.710 HISTORY OF ROBOT-ASSISTED LAPAROSCOPIC HYSTERECTOMY: ICD-10-CM

## 2024-04-17 LAB
APPEARANCE: CLEAR
BILIRUBIN: NEGATIVE
GLUCOSE (URINE DIPSTICK): NEGATIVE MG/DL
KETONES (URINE DIPSTICK): NEGATIVE MG/DL
LEUKOCYTES: NEGATIVE
NITRITE, URINE: NEGATIVE
OCCULT BLOOD: NEGATIVE
PH, URINE: 5.5 (ref 4.5–8)
PROTEIN (URINE DIPSTICK): NEGATIVE MG/DL
SPECIFIC GRAVITY: 1 (ref 1–1.03)
URINE-COLOR: YELLOW
UROBILINOGEN,SEMI-QN: 0.2 MG/DL (ref 0–1.9)

## 2024-04-17 PROCEDURE — 3079F DIAST BP 80-89 MM HG: CPT | Performed by: OBSTETRICS & GYNECOLOGY

## 2024-04-17 PROCEDURE — 99386 PREV VISIT NEW AGE 40-64: CPT | Performed by: OBSTETRICS & GYNECOLOGY

## 2024-04-17 PROCEDURE — 3008F BODY MASS INDEX DOCD: CPT | Performed by: OBSTETRICS & GYNECOLOGY

## 2024-04-17 PROCEDURE — 3075F SYST BP GE 130 - 139MM HG: CPT | Performed by: OBSTETRICS & GYNECOLOGY

## 2024-04-17 PROCEDURE — 81003 URINALYSIS AUTO W/O SCOPE: CPT | Performed by: OBSTETRICS & GYNECOLOGY

## 2024-04-17 NOTE — PROGRESS NOTES
GYN H&P     Genetic questionnaire reviewed with the patient and she will be referred for genetic counseling if the questionnaire had any positive results.    The [facility] intake form was also reviewed regarding contraception, menstrual periods, urinary health, and vaginal / sexual health    2024  2:43 PM    Chief Complaint   Patient presents with    Physical     Needs refill for estradiol. Reports previous US showed cyst on R breast.       HPI: Ngozi is a 53 year old  Patient's last menstrual period was 10/02/2018 (approximate).  (contraception: lap hyst for fibroids) here for her annual gyn exam.     She has no complaints.  On ERT since  - doing well    Previous encounters and chart reviewed.   3/6/2023  2:01 PM     Patient presents with:  Physical: C/O increased fatigue, has mammogram scheduled @ [facility] on 2023.         HPI: Ngozi is a 52 year old  Patient's last menstrual period was 10/02/2018 (approximate).  (contraception: na) here for her annual gyn exam.     S/p robotic hyst complicated by cuff abscess 2018 - fibroids     ERT since  - doing well     Still intermittent itching of perineum - no lesions or discharge     Previous encounters and chart reviewed.      2022  1:16 PM     Patient presents with:  Annual: Some itching/discomfort        HPI: Ngozi is a 51 year old  Patient's last menstrual period was 10/02/2018 (approximate).  (contraception: na on ERT) here for her annual gyn exam.      S/p Robotic TLHBS for fibroids 2018.   ERT since  age 49 - doing well - n symptoms on treatment - offered to try and cut in half.      Notices vulvar dryness after being in hot tubs for a long duration. No discharge.  No dyspareunia      Previous encounters and chart reviewed.   WWE 2020 -    HPI: Ngozi is a 50 year old  Patient's last menstrual period was 10/02/2018 (approximate).  (contraception: robotic hyst - menopause on  Estradiol) here for her annual gyn exam.      She has no complaints. Doing well on ERT without symptoms  OB History    Para Term  AB Living   2 2 2     2   SAB IAB Ectopic Multiple Live Births           2      # Outcome Date GA Lbr Armaan/2nd Weight Sex Type Anes PTL Lv   2 Term  40w0d    NORMAL SPONT   DAVID   1 Term  40w0d    NORMAL SPONT   DAVID       GYN hx:   Menarche: 9  Period Cycle (Days): irreg  Period Duration (Days): 5  Use of Birth Control (if yes, specify type): Hysterectomy  Date When Birth Control Last Used: Hyst   Hx Prior Abnormal Pap: No  Pap Date: 10/10/18  Pap Result Notes: NEG/NEG  Follow Up Recommendation: na - hyst for fibroids      Past Medical History:    Abdominal distention    Allergic rhinitis    Anemia    Bloating    Body piercing    Brachial neuritis or radiculitis NOS    Diabetes mellitus (HCC)    Disorder of thyroid    Dizziness and giddiness    Fatigue    Fibroid uterus    Fibroids    Fibromyalgia    Frequency of urination    Heartburn    Hypothyroidism    Menorrhagia    Multiple thyroid nodules    Other abnormal glucose    Other and unspecified hyperlipidemia    Other malaise and fatigue    Peripheral vertigo, unspecified    Prediabetes    Seasonal allergies    Vertigo    (on and off for 10 yrs)    Visual impairment    contact lenses    Wears glasses     Past Surgical History:   Procedure Laterality Date    Colonoscopy      Hysterectomy  2018          Other surgical history  06/10/2021    removal of lipoma on back     Other surgical history  2022    sinus & nasal surgery    Skin surgery      Vaginal hysterectomy N/A 2018    Procedure: VAGINAL HYSTERECTOMY;  Surgeon: Zeyad Kiran MD;  Location:  MAIN OR     Allergies   Allergen Reactions    Wasps SWELLING    Coffee Bean Extract [Coffea Arabica] OTHER (SEE COMMENTS)     Per allergy testing    [place] UNKNOWN     Swelling, inflammation, SOB    Erythromycin OTHER (SEE COMMENTS)      Abdominal pain    Other OTHER (SEE COMMENTS)     Cayenne pepper- SOB    Pcn [Penicillins] REACTIVE AIRWAY DISEASE    Pork Derived Products OTHER (SEE COMMENTS)     GI upset     Current Outpatient Medications on File Prior to Visit   Medication Sig Dispense Refill    Tirzepatide-Weight Management (ZEPBOUND) 7.5 MG/0.5ML Subcutaneous Solution Auto-injector Inject 7.5 mg into the skin once a week for 4 doses. 2 mL 1    Tirzepatide-Weight Management (ZEPBOUND) 5 MG/0.5ML Subcutaneous Solution Auto-injector Inject 5 mg into the skin once a week. 2 mL 1    azelastine 0.1 % Nasal Solution 2 sprays by Nasal route 2 (two) times daily as needed. 90 mL 3    metFORMIN 500 MG Oral Tab Take 1 tablet (500 mg total) by mouth 2 (two) times daily with meals. 180 tablet 3    liothyronine 5 MCG Oral Tab Take 1 tablet (5 mcg total) by mouth daily. 90 tablet 3    AUVI-Q 0.3 MG/0.3ML Injection Solution Auto-injector Inject 0.3 mL (1 each total) as directed as needed. 1 each 3    Cholecalciferol (VITAMIN D) 1000 UNITS Oral Tab Take 1,000 Units by mouth daily.        Acidophilus/Pectin Oral Cap Take 1 capsule by mouth daily.      ALPRAZolam (XANAX) 0.25 MG Oral Tab Take 1 tablet (0.25 mg total) by mouth nightly as needed for Anxiety. (Patient not taking: Reported on 4/17/2024) 20 tablet 0    semaglutide-weight management (WEGOVY) 1.7 MG/0.75ML Subcutaneous Solution Auto-injector Inject 0.75 mL (1.7 mg total) into the skin once a week. 9 mL 0    semaglutide-weight management (WEGOVY) 1.7 MG/0.75ML Subcutaneous Solution Auto-injector Inject 0.75 mL (1.7 mg total) into the skin once a week. (Patient not taking: Reported on 12/14/2023) 3 mL 1     No current facility-administered medications on file prior to visit.     Family History   Problem Relation Age of Onset    Diabetes Sister     Ovarian Cancer Sister     Other (ovarian cancer) Sister     Asthma Sister     Stroke Other     Cancer Other     Diabetes Father     Hypertension Father      Other (thyroid disease) Mother     Other (lymphoma) Mother     Dementia Mother     No Known Problems Sister     Stroke Paternal Grandmother     Asthma Son      Social History     Socioeconomic History    Marital status:      Spouse name: Not on file    Number of children: Not on file    Years of education: Not on file    Highest education level: Not on file   Occupational History    Not on file   Tobacco Use    Smoking status: Former     Current packs/day: 0.00     Average packs/day: 0.3 packs/day for 15.0 years (3.8 ttl pk-yrs)     Types: Cigarettes     Start date: 1989     Quit date: 2004     Years since quittin.3    Smokeless tobacco: Never   Vaping Use    Vaping status: Never Used   Substance and Sexual Activity    Alcohol use: Yes     Comment: Less than one per week    Drug use: No    Sexual activity: Yes     Partners: Male     Birth control/protection: Hysterectomy   Other Topics Concern     Service Not Asked    Blood Transfusions Not Asked    Caffeine Concern No    Occupational Exposure Not Asked    Hobby Hazards Not Asked    Sleep Concern Not Asked    Stress Concern No    Weight Concern Yes    Special Diet No    Back Care Not Asked    Exercise Yes    Bike Helmet Not Asked    Seat Belt Yes    Self-Exams Not Asked   Social History Narrative    Not on file     Social Determinants of Health     Financial Resource Strain: Not on file   Food Insecurity: Not on file   Transportation Needs: Not on file   Physical Activity: Not on file   Stress: Not on file   Social Connections: Unknown (3/13/2021)    Received from [facility], [facility]    Social Connections     Conversations with friends/family/neighbors per week: Not on file   Housing Stability: Low Risk  (2021)    Received from [facility], [facility]    Housing Stability     Mortgage Payment Concerns?: Not on file     Number of Places Lived in the Last  Year: Not on file     Unstable Housing?: Not on file       ROS:     Review of Systems:  General: denies fevers, chills, fatigue and malaise.   Eyes: no visual changes, denies headaches  ENT: no complaints, denies earaches, runny nose, epistaxis, throat pain or sore throat  Respiratory: denies SOB, dyspnea, cough or wheezing  Cardiovascular: denies chest pain, palpitations, exercise intolerance   GI: denies abdominal pain, diarrhea, constipation  : no complaints, denies dysuria, increased urinary frequency. , no dyspareunia   Hematological/lymphatic: denies history of excessive bleeding or bruising, denies dizziness, lightheadedness.   Breast: denies rashes, skin changes, pain, lumps or discharge   Psychiatric: denies depression, changes in sleep patterns, anxiety  Endocrine: denies hot or cold intolerance, mood changes   Neurological: denies changes in sight, smell, hearing or taste. Denies seizures or tremors  Immunological: denies allergies, denies anaphylaxis, or swollen lymph nodes  Musculoskeletal: denies joint pain, morning stiffness, decreased range of motion         O /88   Ht 67\"   Wt 206 lb (93.4 kg)   LMP 10/02/2018 (Approximate)   BMI 32.26 kg/m²         Wt Readings from Last 6 Encounters:   04/17/24 206 lb (93.4 kg)   04/11/24 205 lb 6.4 oz (93.2 kg)   12/14/23 201 lb (91.2 kg)   12/07/23 207 lb (93.9 kg)   11/12/23 201 lb (91.2 kg)   11/07/23 190 lb (86.2 kg)     Exam:   GENERAL: well developed, well nourished, in no apparent distress, oriented.  SKIN: no rashes, no suspicious lesions  HEENT: normal  NECK: supple; no thyromegaly, no adenopathy  LUNGS: clear to auscultation  CARDIOVASCULAR: normal S1, S2, RRR  BREASTS: soft, nontender, no palpable masses or nodes, no nipple discharge, no skin changes, no axillary adenopathy  ABDOMEN: scope scars,  soft, non distended; non tender, no masses  PELVIC: External Genitalia: Normal appearing, no lesions.    Vagina: normal pink mucosa, no lesions,  normal clear discharge.    Bladder well supported.  No  anterior or posterior hernias    Cuff well healed and supported    Adnexa: non tender, no masses, normal size    Rectal: deferred  EXTREMITIES:  non tender without edema        A/P: Patient is 53 year old female with no complaints. Here for well woman exam.            Patient counseled on:    Diet/exercise.      Self Breast Exams     Safe sex practices / and living environment     Vaccines:  Annual Flu, Tdap +/- Gardasil not recommended (up to 45 yrs).      Pneumococcal at 65 yrs old, Shingles at 60 yrs old          Pap: neg/neg - Year:  na  GC/Chlamydia:  na  Mammogram:  negative 2023 , reordered via [facility]   Dexa:  na  Colonoscopy:  normal at 36 yo and again in 2022 - repeat 2032  Lipid / Cholesterol:    Lipid Panel [[id number]] (Abnormal)  Resulted: 12/07/23 1852, Result status: Final result  Resulting lab: [facility] ([facility])     Specimen Information    ID Type Source Collected On   [id number] Blood -- 12/07/23 1338     Components    Component Value Reference Range Flag   Cholesterol, Total 240 <200 mg/dL H High    Comment: Desirable  <200 mg/dL  Borderline  200-239 mg/dL  High      >=240 mg/dL     HDL Cholesterol 50 40 - 59 mg/dL --   Comment:      Interpretive Information:  An HDL cholesterol <40 mg/dL is low and constitutes a coronary heart disease risk factor. An HDL cholesterol >60 mg/dL is a negative risk factor for coronary heart disease.     Triglycerides 127 30 - 149 mg/dL --   Comment:      Reference interval for fasting triglycerides  Desirable: <150 mg/dL  Borderline: 150-199 mg/dL  High: 200-499 mg/dL  Very High: >=500 mg/dL       LDL Cholesterol 167 <100 mg/dL H High    Comment: Desirable <100 mg/dL  Borderline 100-129 mg/dL  High     >=130mg/dL     VLDL 25 0 - 30 mg/dL --   Non HDL Chol 190 <130 mg/dL H High    Comment:      Desirable  <130 mg/dL  Borderline  130-159 mg/dL  High        160-189 mg/dL      Very high  >=190 mg/dL            Meds This Visit:    Requested Prescriptions     Signed Prescriptions Disp Refills    estrogens conjugated (PREMARIN) 0.3 MG Oral Tab 90 tablet 3     Sig: Take 1 tablet (0.3 mg total) by mouth daily.       1. Annual physical exam    2. Hormone replacement therapy (HRT)  - estrogens conjugated (PREMARIN) 0.3 MG Oral Tab; Take 1 tablet (0.3 mg total) by mouth daily.  Dispense: 90 tablet; Refill: 3    3. History of robot-assisted laparoscopic hysterectomy    4. Screening for bladder cancer  - Urinalysis [29417]    [facility] 2022 Key Points - updated [facility]I 20 year follow up.    Lowest possible dose, shortest duration, only symptomatic women constantly weighing the risks vs benefits  Women 60 or younger or 10 years from menopause is key demographic - can continue if symptomatic  CAREFUL STARTING MORE THAN 10 YEARS AFTER MENOPAUSE - benefits less favorable  Absolute risks of HRT and ERT are very rare.  Having a glass of alcohol a day has same risk of breast cancer as HRT  Absolute risk of all forms of mortality, fracture, breast cancer, and diabetes all  less for women on HRT/ERT for women younger than 60  Increased risk of VTE, gal bladder, - CV and stroke in older patients only, prevention in younger patients    Four indications: VMS, prevention of osteoporosis, treatment of premature menopause, VV symptoms.     Nightly Micronized progesterone helps with hot flashes if E contraindicated.   Compounded bioidenticals safety concerns- only if allergic to FDA approved.     Micronized 18B estradiol is bioidentical as is micronized progesterone    Treat women in premature menopause (Primary ovarian insufficiency) until 52 - longer if symptomatic  WHI does not apply to these women    Benefit to hair, skin, nails, collagen, can help with open-angle glaucoma    Helps prevent muscle waisting    Prevents dementia in women younger than 65, may increase risk in older women    Prevents CHD in younger women - reducing  the risk of blood clot, heart attack and stroke - worsens in older women.     Less than one additional women with breast cancer  per 1,000 users annually = one glass of alcohol/day and less than two glasses of alcohol / day -ERT ONLY,   ERT - NO INCREASE IN BREAST CANCER RISK AND MAY REDUCE    DuaVee - no increased risk - probably reduces risk of breast cancer.     Ok in BRCA,  with family hx of breast cancer  and hx of most genital cancers except hormone receptor breast cancer for systemic hormones, vaginal Ok     Does not increase endometrial CA recurrence    OCP's significant reduction in ovarian cancer - persists for up to 30 years    HRT/ERT reduces risk of colon cancer and morality       Return in about 1 year (around 4/17/2025) for Well Woman Exam.    Zeyad Kiran MD   4/17/2024  2:43 PM

## 2024-04-23 NOTE — TELEPHONE ENCOUNTER
----- Message from Gisele Bryant sent at 4/22/2024  4:16 PM CDT -----  Regarding: generic brand  Pt states that she needs generic brand for insurance purposes but pharmacy gave her name brand.

## 2024-04-25 ENCOUNTER — MED REC SCAN ONLY (OUTPATIENT)
Facility: CLINIC | Age: 54
End: 2024-04-25

## 2024-04-25 ENCOUNTER — HOSPITAL ENCOUNTER (OUTPATIENT)
Age: 54
Discharge: HOME | End: 2024-04-25
Payer: COMMERCIAL

## 2024-04-25 VITALS
WEIGHT: 198 LBS | SYSTOLIC BLOOD PRESSURE: 136 MMHG | RESPIRATION RATE: 16 BRPM | TEMPERATURE: 99 F | HEART RATE: 112 BPM | DIASTOLIC BLOOD PRESSURE: 86 MMHG | OXYGEN SATURATION: 98 % | HEIGHT: 67 IN | BODY MASS INDEX: 31.08 KG/M2

## 2024-04-25 DIAGNOSIS — J02.8 ACUTE BACTERIAL PHARYNGITIS: Primary | ICD-10-CM

## 2024-04-25 DIAGNOSIS — J02.9 SORE THROAT: ICD-10-CM

## 2024-04-25 DIAGNOSIS — B96.89 ACUTE BACTERIAL PHARYNGITIS: Primary | ICD-10-CM

## 2024-04-25 LAB
S PYO AG THROAT QL: NEGATIVE
SARS-COV-2 RNA RESP QL NAA+PROBE: NOT DETECTED

## 2024-04-25 PROCEDURE — 87880 STREP A ASSAY W/OPTIC: CPT | Performed by: PHYSICIAN ASSISTANT

## 2024-04-25 PROCEDURE — 99213 OFFICE O/P EST LOW 20 MIN: CPT | Performed by: PHYSICIAN ASSISTANT

## 2024-04-25 PROCEDURE — U0002 COVID-19 LAB TEST NON-CDC: HCPCS | Performed by: PHYSICIAN ASSISTANT

## 2024-04-25 RX ORDER — DEXAMETHASONE 4 MG/1
8 TABLET ORAL ONCE
Status: COMPLETED | OUTPATIENT
Start: 2024-04-25 | End: 2024-04-25

## 2024-04-25 RX ORDER — IBUPROFEN 600 MG/1
600 TABLET ORAL ONCE
Status: COMPLETED | OUTPATIENT
Start: 2024-04-25 | End: 2024-04-25

## 2024-04-25 RX ORDER — AZITHROMYCIN 250 MG/1
TABLET, FILM COATED ORAL
Qty: 6 TABLET | Refills: 0 | Status: SHIPPED | OUTPATIENT
Start: 2024-04-25 | End: 2024-04-30

## 2024-04-25 NOTE — DISCHARGE INSTRUCTIONS
Increase fluids and rest  Ibuprofen every 6 hours as needed  Continue this allergy medications  Take zpack as directed until gone  Follow up with primary care doctor in 48 hours  Return to the ER if symptoms worsen

## 2024-04-25 NOTE — ED PROVIDER NOTES
Patient Seen in: [facility]      History     Chief Complaint   Patient presents with    Sore Throat    Ear Pain     Stated Complaint: sore throat, ear pain    Subjective:   The history is provided by the patient.       A 53 year old female with a history of allergies presents to the immediate care with a sore throat and bilateral ear pain x 6 days. Sore throat - subjective fevers, but no trismus, drooling, muffled voice.  She also describes hoarseness, mild congestion with swollen lymph nodes. Bilat ear pain without muffled hearing, drainage. Reports dry cough from post nasal drip. Denies sob, chest pain, abdominal pain, N/V/D or recent travel. Initially believed she was improved with conservative treatments but worsening symptoms today     Objective:   Past Medical History:    Abdominal distention    Allergic rhinitis    Anemia    Bloating    Body piercing    Brachial neuritis or radiculitis NOS    Diabetes mellitus (HCC)    Disorder of thyroid    Dizziness and giddiness    Fatigue    Fibroid uterus    Fibroids    Fibromyalgia    Frequency of urination    Heartburn    Hypothyroidism    Menorrhagia    Multiple thyroid nodules    Other abnormal glucose    Other and unspecified hyperlipidemia    Other malaise and fatigue    Peripheral vertigo, unspecified    Prediabetes    Seasonal allergies    Vertigo    (on and off for 10 yrs)    Visual impairment    contact lenses    Wears glasses              Past Surgical History:   Procedure Laterality Date    Colonoscopy      Hysterectomy  2018          Other surgical history  06/10/2021    removal of lipoma on back     Other surgical history  2022    sinus & nasal surgery    Skin surgery      Vaginal hysterectomy N/A 2018    Procedure: VAGINAL HYSTERECTOMY;  Surgeon: Zeyad Kiran MD;  Location:  MAIN OR                Social History     Socioeconomic History    Marital status:    Tobacco Use    Smoking status: Former      Current packs/day: 0.00     Average packs/day: 0.3 packs/day for 15.0 years (3.8 ttl pk-yrs)     Types: Cigarettes     Start date: 1989     Quit date: 2004     Years since quittin.3    Smokeless tobacco: Never   Vaping Use    Vaping status: Never Used   Substance and Sexual Activity    Alcohol use: Yes     Comment: Less than one per week    Drug use: No    Sexual activity: Yes     Partners: Male     Birth control/protection: Hysterectomy   Other Topics Concern    Caffeine Concern No    Stress Concern No    Weight Concern Yes    Special Diet No    Exercise Yes    Seat Belt Yes     Social Determinants of Health      Received from [facility], [facility]    Social Connections    Received from [facility], [facility]    Housing Stability              Review of Systems   Constitutional:  Positive for fever.   HENT:  Positive for congestion, ear pain and sore throat. Negative for ear discharge, trouble swallowing and voice change.    Eyes: Negative.    Respiratory:  Positive for cough. Negative for shortness of breath, wheezing and stridor.    Cardiovascular: Negative.    Gastrointestinal: Negative.        Positive for stated complaint: sore throat, ear pain  Other systems are as noted in HPI.  Constitutional and vital signs reviewed.      All other systems reviewed and negative except as noted above.    Physical Exam     ED Triage Vitals [24 1543]   /86   Pulse 112   Resp 16   Temp 99.2 °F (37.3 °C)   Temp src Temporal   SpO2 98 %   O2 Device None (Room air)       Current:/86   Pulse 112   Temp 99.2 °F (37.3 °C) (Temporal)   Resp 16   Ht 170.2 cm (5' 7\")   Wt 89.8 kg   LMP 10/02/2018 (Approximate)   SpO2 98%   BMI 31.01 kg/m²         Physical Exam  Vitals and nursing note reviewed.   Constitutional:       Appearance: She is well-developed.   HENT:      Right Ear: Tympanic membrane and ear canal normal.       Left Ear: Tympanic membrane and ear canal normal.      Nose: Congestion present.      Mouth/Throat:      Mouth: Mucous membranes are moist.      Pharynx: Uvula midline. Posterior oropharyngeal erythema present. No pharyngeal swelling, oropharyngeal exudate or uvula swelling.      Tonsils: No tonsillar exudate or tonsillar abscesses.      Comments: Cobblestoning.   Cardiovascular:      Rate and Rhythm: Normal rate and regular rhythm.   Pulmonary:      Effort: Pulmonary effort is normal.      Breath sounds: Normal breath sounds.   Musculoskeletal:      Cervical back: Normal range of motion.   Lymphadenopathy:      Cervical: Cervical adenopathy present.   Skin:     General: Skin is warm and dry.   Neurological:      General: No focal deficit present.      Mental Status: She is alert and oriented to person, place, and time.   Psychiatric:         Mood and Affect: Mood normal.         Behavior: Behavior normal.             ED Course     Labs Reviewed   POCT RAPID STREP - Normal   RAPID SARS-COV-2 BY PCR - Normal                    MDM     53 year old female presents with sore throat and ear pain x 6 days. Copious nasal congestion and PND despite her allergy medication. Subjective fevers today,     History obtained: self    Differential DX  included but not limited to:   COVID, influenza, strep, acute pharyngitis, sinusitis    On exam the patient is afebrile nontoxic.  Vital signs are stable.  Mild nasal congestion present.  Cobblestoning in the posterior pharynx with 1+ tonsillar edema.  No exudate.  Lungs clear to auscultation.  Rest exam is unremarkable.  Rapid strep is negative COVID testing is negative.  Influenza testing was not performed due to the duration of her symptoms. Will treat for sinusitis - will give 1 dose of decadron along with azithromycin (medication previously prescribed by her ENT due to PCN and erythromycin allergy). Discussed at lengths with the patient at home care and strict return  precautions.                              Medical Decision Making  Problems Addressed:  Acute bacterial pharyngitis: acute illness or injury    Amount and/or Complexity of Data Reviewed  Labs: ordered. Decision-making details documented in ED Course.    Risk  OTC drugs.  Prescription drug management.        Disposition and Plan     Clinical Impression:  1. Acute bacterial pharyngitis    2. Sore throat         Disposition:  Discharge  4/25/2024  4:38 pm    Follow-up:  Ann Vidal MD  [address] [place] [id number] [phone]                Medications Prescribed:  Discharge Medication List as of 4/25/2024  4:46 PM        START taking these medications    Details   azithromycin (ZITHROMAX Z-FARHAT) 250 MG Oral Tab 500 mg once followed by 250 mg daily x 4 days, Normal, Disp-6 tablet, R-0

## 2024-04-29 ENCOUNTER — PATIENT MESSAGE (OUTPATIENT)
Facility: CLINIC | Age: 54
End: 2024-04-29

## 2024-04-29 DIAGNOSIS — Z79.890 HORMONE REPLACEMENT THERAPY (HRT): ICD-10-CM

## 2024-05-06 RX ORDER — ESTRADIOL 0.5 MG/1
0.5 TABLET ORAL DAILY
Qty: 90 TABLET | Refills: 1 | Status: SHIPPED | OUTPATIENT
Start: 2024-05-06

## 2024-05-06 NOTE — TELEPHONE ENCOUNTER
----- Message from Gisele Bryant sent at 5/6/2024  8:42 AM CDT -----  Regarding: FW: Medication  Contact: [phone]    ----- Message -----  From: Ngozi Mathew  Sent: 4/29/2024   8:32 PM CDT  To: [facility] Triage Pool  Subject: Medication                                       I need to get my arm switched to the generic brand I’ve been taking. My insurance may say they prefer name brand, but they don’t. It is over $100 for the name brand, and around $3 for the generic. Please send new Rx. Thanks.

## 2024-05-10 ENCOUNTER — APPOINTMENT (OUTPATIENT)
Dept: GENERAL RADIOLOGY | Age: 54
End: 2024-05-10
Attending: PHYSICIAN ASSISTANT

## 2024-05-10 ENCOUNTER — HOSPITAL ENCOUNTER (OUTPATIENT)
Age: 54
Discharge: HOME | End: 2024-05-10

## 2024-05-10 VITALS
BODY MASS INDEX: 31.08 KG/M2 | WEIGHT: 198 LBS | SYSTOLIC BLOOD PRESSURE: 134 MMHG | DIASTOLIC BLOOD PRESSURE: 89 MMHG | TEMPERATURE: 98 F | HEIGHT: 67 IN | RESPIRATION RATE: 18 BRPM | HEART RATE: 91 BPM | OXYGEN SATURATION: 98 %

## 2024-05-10 DIAGNOSIS — R05.2 SUBACUTE COUGH: Primary | ICD-10-CM

## 2024-05-10 DIAGNOSIS — J20.8 VIRAL BRONCHITIS: ICD-10-CM

## 2024-05-10 PROCEDURE — 71046 X-RAY EXAM CHEST 2 VIEWS: CPT | Performed by: PHYSICIAN ASSISTANT

## 2024-05-10 PROCEDURE — 99214 OFFICE O/P EST MOD 30 MIN: CPT | Performed by: PHYSICIAN ASSISTANT

## 2024-05-10 RX ORDER — PREDNISONE 20 MG/1
40 TABLET ORAL DAILY
Qty: 10 TABLET | Refills: 0 | Status: SHIPPED | OUTPATIENT
Start: 2024-05-10 | End: 2024-05-15

## 2024-05-10 RX ORDER — ALBUTEROL SULFATE 90 UG/1
2 AEROSOL, METERED RESPIRATORY (INHALATION) EVERY 4 HOURS PRN
Qty: 1 EACH | Refills: 0 | Status: SHIPPED | OUTPATIENT
Start: 2024-05-10 | End: 2024-06-09

## 2024-05-10 RX ORDER — BENZONATATE 200 MG/1
200 CAPSULE ORAL 3 TIMES DAILY PRN
Qty: 20 CAPSULE | Refills: 0 | Status: SHIPPED | OUTPATIENT
Start: 2024-05-10

## 2024-05-10 RX ORDER — LEVOCETIRIZINE DIHYDROCHLORIDE 5 MG/1
5 TABLET, FILM COATED ORAL EVERY EVENING
COMMUNITY

## 2024-05-10 NOTE — DISCHARGE INSTRUCTIONS
Continue her allergy medication  Use your inhaler every 4-6 hours  Take prednisone as directed until gone  Can use Tessalon Perles as needed   increase fluid and rest  Follow-up with primary care doctor

## 2024-05-10 NOTE — ED PROVIDER NOTES
Patient Seen in: [facility]      History   No chief complaint on file.    Stated Complaint: cough    Subjective:   The history is provided by the patient.       53-year-old female with past med history of hypothyroidism, prediabetes, allergic rhinitis presents to the urgent care due to persistent cough for the past 2 weeks.  Cough is mainly dry but at times productive.  No fevers chest pain or difficulty breathing.  Today had a choking cough very wet concern that it could be fluid in her lungs.  Initially did have upper respiratory symptoms including a pharyngitis.  Patient was prescribed Decadron azithromycin and advised to take antihistamines which had significant improvement of the upper respiratory symptoms however now with a cough.  Admits her  has sick at home with an unknown virus with similar symptoms over the last few days.  No history of asthma smoking or COPD.  Patient is concerned due to her upcoming trip to [place].    Objective:   Past Medical History:    Abdominal distention    Allergic rhinitis    Anemia    Bloating    Body piercing    Brachial neuritis or radiculitis NOS    Diabetes mellitus (HCC)    Disorder of thyroid    Dizziness and giddiness    Fatigue    Fibroid uterus    Fibroids    Fibromyalgia    Frequency of urination    Heartburn    Hypothyroidism    Menorrhagia    Multiple thyroid nodules    Other abnormal glucose    Other and unspecified hyperlipidemia    Other malaise and fatigue    Peripheral vertigo, unspecified    Prediabetes    Seasonal allergies    Vertigo    (on and off for 10 yrs)    Visual impairment    contact lenses    Wears glasses              Past Surgical History:   Procedure Laterality Date    Colonoscopy      Hysterectomy  2018          Other surgical history  06/10/2021    removal of lipoma on back     Other surgical history  2022    sinus & nasal surgery    Skin surgery      Vaginal hysterectomy N/A 2018    Procedure:  VAGINAL HYSTERECTOMY;  Surgeon: Zeyad Kiran MD;  Location:  MAIN OR                No pertinent social history.            Review of Systems   Constitutional: Negative.    HENT: Negative.     Respiratory:  Positive for cough. Negative for shortness of breath, wheezing and stridor.    Cardiovascular: Negative.    Gastrointestinal: Negative.        Positive for stated complaint: cough  Other systems are as noted in HPI.  Constitutional and vital signs reviewed.      All other systems reviewed and negative except as noted above.    Physical Exam     ED Triage Vitals [05/10/24 1635]   /89   Pulse 91   Resp 18   Temp 98.1 °F (36.7 °C)   Temp src Temporal   SpO2 98 %   O2 Device None (Room air)       Current Vitals:   Vital Signs  BP: 134/89  Pulse: 91  Resp: 18  Temp: 98.1 °F (36.7 °C)  Temp src: Temporal    Oxygen Therapy  SpO2: 98 %  O2 Device: None (Room air)            Physical Exam  Vitals and nursing note reviewed.   Constitutional:       General: She is not in acute distress.     Appearance: Normal appearance. She is not toxic-appearing.   HENT:      Head: Normocephalic.      Right Ear: Tympanic membrane, ear canal and external ear normal.      Left Ear: Tympanic membrane, ear canal and external ear normal.      Nose: Nose normal.      Mouth/Throat:      Mouth: Mucous membranes are moist.   Eyes:      Extraocular Movements: Extraocular movements intact.      Conjunctiva/sclera: Conjunctivae normal.      Pupils: Pupils are equal, round, and reactive to light.   Cardiovascular:      Rate and Rhythm: Normal rate and regular rhythm.   Pulmonary:      Effort: Pulmonary effort is normal. No respiratory distress.      Breath sounds: Normal breath sounds. No wheezing.   Musculoskeletal:         General: Normal range of motion.      Cervical back: Normal range of motion.   Skin:     General: Skin is warm.   Neurological:      General: No focal deficit present.      Mental Status: She is alert and oriented  to person, place, and time.   Psychiatric:         Mood and Affect: Mood normal.         Behavior: Behavior normal.               ED Course   Labs Reviewed - No data to display          XR CHEST PA + LAT CHEST (CPT=71046)    Result Date: 5/10/2024  PROCEDURE:  XR CHEST PA + LAT CHEST (CPT=71046)  INDICATIONS:  cough  COMPARISON:  [facility], XR, XR CHEST PA + LAT CHEST (CPT=71046), 7/21/2021, 3:35 PM.  TECHNIQUE:  PA and lateral chest radiographs were obtained.  PATIENT STATED HISTORY: (As transcribed by Technologist)  The patient has a chronic cough.    FINDINGS:  Normal heart size and pulmonary vascularity.  No focal consolidation.  Thin linear strand of atelectasis in the lingula.  No pleural effusion.            CONCLUSION:  Minimal lingular atelectasis.   LOCATION:  [facility]   Dictated by (CST): Matthew Barksdale MD on 5/10/2024 at 5:43 PM     Finalized by (CST): Matthew Barksdale MD on 5/10/2024 at 5:43 PM               MDM   Ddx -viral bronchitis, viral URI with cough, CAP      The patient is afebrile nontoxic.  Vital signs are stable.  She is in no acute distress.  HEENT exam is completely unremarkable.  Lungs clear to auscultation.  Patient is very concerned due to this productive worsening cough.  Chest x-ray was performed showing no acute findings.  Exam is consistent with a viral bronchitis.  Will place the patient on prednisone, albuterol inhaler.  Tessalon Perles as needed for cough.  Strict return precautions were discussed at length all questions were answered patient comfortable treatment plan and discharge home                           Medical Decision Making  Problems Addressed:  Subacute cough: acute illness or injury    Amount and/or Complexity of Data Reviewed  Radiology: ordered and independent interpretation performed. Decision-making details documented in ED Course.     Details: No consolidation    Risk  OTC drugs.  Prescription drug management.        Disposition and Plan      Clinical Impression:  1. Subacute cough    2. Viral bronchitis         Disposition:  Discharge  5/10/2024  5:57 pm    Follow-up:  Ann Vidal MD  [address] [place] [id number] [phone]                Medications Prescribed:  Discharge Medication List as of 5/10/2024  6:04 PM        START taking these medications    Details   predniSONE 20 MG Oral Tab Take 2 tablets (40 mg total) by mouth daily for 5 days., Normal, Disp-10 tablet, R-0      albuterol 108 (90 Base) MCG/ACT Inhalation Aero Soln Inhale 2 puffs into the lungs every 4 (four) hours as needed for Wheezing., Normal, Disp-1 each, R-0      benzonatate 200 MG Oral Cap Take 1 capsule (200 mg total) by mouth 3 (three) times daily as needed., Normal, Disp-20 capsule, R-0

## 2024-05-13 ENCOUNTER — MED REC SCAN ONLY (OUTPATIENT)
Facility: CLINIC | Age: 54
End: 2024-05-13

## 2024-05-15 ENCOUNTER — PATIENT MESSAGE (OUTPATIENT)
Dept: INTERNAL MEDICINE CLINIC | Facility: CLINIC | Age: 54
End: 2024-05-15

## 2024-05-15 DIAGNOSIS — E66.9 OBESITY (BMI 30-39.9): Primary | ICD-10-CM

## 2024-05-15 NOTE — TELEPHONE ENCOUNTER
From: Ngozi Mathew  To: Aby Palacios  Sent: 5/15/2024 4:04 PM CDT  Subject: Medication    Hello. I did well on the Zepbound, but I was due for my next dose on May 5th and the medication was not available. Will I need to go down a dose? I also wonder if Monjauro is available more readily than Zepbound. My regular doctor said I would qualify for it because I have had two higher A1C results several years back. Please advise what I can do. This Monday will be three weeks since I had a dose.

## 2024-05-17 RX ORDER — TIRZEPATIDE 2.5 MG/.5ML
2.5 INJECTION, SOLUTION SUBCUTANEOUS WEEKLY
Qty: 2 ML | Refills: 0 | Status: SHIPPED | OUTPATIENT
Start: 2024-05-17

## 2024-05-21 ENCOUNTER — OFFICE VISIT (OUTPATIENT)
Dept: INTERNAL MEDICINE CLINIC | Facility: CLINIC | Age: 54
End: 2024-05-21

## 2024-05-21 VITALS
SYSTOLIC BLOOD PRESSURE: 132 MMHG | RESPIRATION RATE: 18 BRPM | TEMPERATURE: 97 F | HEIGHT: 67 IN | DIASTOLIC BLOOD PRESSURE: 84 MMHG | WEIGHT: 208.63 LBS | BODY MASS INDEX: 32.74 KG/M2 | HEART RATE: 98 BPM

## 2024-05-21 DIAGNOSIS — J22 ACUTE LOWER RESPIRATORY INFECTION: ICD-10-CM

## 2024-05-21 DIAGNOSIS — J22 ACUTE LOWER RESPIRATORY INFECTION: Primary | ICD-10-CM

## 2024-05-21 PROCEDURE — 99213 OFFICE O/P EST LOW 20 MIN: CPT | Performed by: FAMILY MEDICINE

## 2024-05-21 PROCEDURE — 3008F BODY MASS INDEX DOCD: CPT | Performed by: FAMILY MEDICINE

## 2024-05-21 PROCEDURE — 3079F DIAST BP 80-89 MM HG: CPT | Performed by: FAMILY MEDICINE

## 2024-05-21 PROCEDURE — 3075F SYST BP GE 130 - 139MM HG: CPT | Performed by: FAMILY MEDICINE

## 2024-05-21 RX ORDER — MONTELUKAST SODIUM 10 MG/1
10 TABLET ORAL NIGHTLY
Qty: 30 TABLET | Refills: 1 | Status: SHIPPED | OUTPATIENT
Start: 2024-05-21

## 2024-05-21 RX ORDER — AZITHROMYCIN 250 MG/1
TABLET, FILM COATED ORAL
Qty: 6 TABLET | Refills: 0 | Status: SHIPPED | OUTPATIENT
Start: 2024-05-21 | End: 2024-05-26

## 2024-05-21 NOTE — PROGRESS NOTES
CHIEF COMPLAINT:     Chief Complaint   Patient presents with    Cough     Since April, went in twice into the . OTC mucinex        HPI:   Ngozi Mathew is a 53 year old female who presents for upper respiratory/cough symptoms since April 25,2024. Pt went to the  then and went back on 5/10/24. Patient reports dry cough, chest tightness, occ SOB, PND, irritated throat from coughing, denies fever, denies sinus pain. Symptoms have been not improving since onset.  Treating symptoms with albuterol from the , tessalon from , and xyzal.  Pt also had a 5 day course if prednisone which did not seem to help at all. Pt thought it was more allergy but thinks she may still have an infection.   Associated symptoms include allergies.  .    Current Outpatient Medications   Medication Sig Dispense Refill    Tirzepatide-Weight Management (ZEPBOUND) 2.5 MG/0.5ML Subcutaneous Solution Auto-injector Inject 2.5 mg into the skin once a week. 2 mL 0    levocetirizine 5 MG Oral Tab Take 1 tablet (5 mg total) by mouth every evening.      albuterol 108 (90 Base) MCG/ACT Inhalation Aero Soln Inhale 2 puffs into the lungs every 4 (four) hours as needed for Wheezing. 1 each 0    estradiol (ESTRACE) 0.5 MG Oral Tab Take 1 tablet (0.5 mg total) by mouth daily. 90 tablet 1    azelastine 0.1 % Nasal Solution 2 sprays by Nasal route 2 (two) times daily as needed. 90 mL 3    metFORMIN 500 MG Oral Tab Take 1 tablet (500 mg total) by mouth 2 (two) times daily with meals. 180 tablet 3    liothyronine 5 MCG Oral Tab Take 1 tablet (5 mcg total) by mouth daily. 90 tablet 3    AUVI-Q 0.3 MG/0.3ML Injection Solution Auto-injector Inject 0.3 mL (1 each total) as directed as needed. 1 each 3    Cholecalciferol (VITAMIN D) 1000 UNITS Oral Tab Take 1,000 Units by mouth daily.        Acidophilus/Pectin Oral Cap Take 1 capsule by mouth daily.      ALPRAZolam (XANAX) 0.25 MG Oral Tab Take 1 tablet (0.25 mg total) by mouth nightly as needed for Anxiety.  (Patient not taking: Reported on 2024) 20 tablet 0    Tirzepatide-Weight Management (ZEPBOUND) 5 MG/0.5ML Subcutaneous Solution Auto-injector Inject 5 mg into the skin once a week. (Patient not taking: Reported on 2024) 2 mL 1      Past Medical History:    Abdominal distention    Allergic rhinitis    Anemia    Bloating    Body piercing    Brachial neuritis or radiculitis NOS    Diabetes mellitus (HCC)    Disorder of thyroid    Dizziness and giddiness    Fatigue    Fibroid uterus    Fibroids    Fibromyalgia    Frequency of urination    Heartburn    Hypothyroidism    Menorrhagia    Multiple thyroid nodules    Other abnormal glucose    Other and unspecified hyperlipidemia    Other malaise and fatigue    Peripheral vertigo, unspecified    Prediabetes    Seasonal allergies    Vertigo    (on and off for 10 yrs)    Visual impairment    contact lenses    Wears glasses      Past Surgical History:   Procedure Laterality Date    Colonoscopy      Hysterectomy  2018          Other surgical history  06/10/2021    removal of lipoma on back     Other surgical history  2022    sinus & nasal surgery    Skin surgery      Vaginal hysterectomy N/A 2018    Procedure: VAGINAL HYSTERECTOMY;  Surgeon: Zeyad Kiran MD;  Location:  MAIN OR         Social History     Socioeconomic History    Marital status:    Tobacco Use    Smoking status: Former     Current packs/day: 0.00     Average packs/day: 0.3 packs/day for 15.0 years (3.8 ttl pk-yrs)     Types: Cigarettes     Start date: 1989     Quit date: 2004     Years since quittin.4    Smokeless tobacco: Never   Vaping Use    Vaping status: Never Used   Substance and Sexual Activity    Alcohol use: Yes     Comment: Less than one per week    Drug use: No    Sexual activity: Yes     Partners: Male     Birth control/protection: Hysterectomy   Other Topics Concern    Caffeine Concern No    Stress Concern No    Weight Concern Yes     Special Diet No    Exercise Yes    Seat Belt Yes     Social Determinants of Health      [facility]    Social Connections    [facility]    Housing Stability         REVIEW OF SYSTEMS:   GENERAL: feels well otherwise,  unchanged appetite  SKIN: no rashes or abnormal skin lesions  HEENT: See HPI  LUNGS:  See HPI  CARDIOVASCULAR: denies chest pain or palpitations   GI: denies N/V/C or abdominal pain  NEURO: Denies headaches    EXAM:   /84 (BP Location: Left arm, Patient Position: Sitting, Cuff Size: adult)   Pulse 98   Temp 97.2 °F (36.2 °C) (Temporal)   Resp 18   Ht 5' 7\" (1.702 m)   Wt 208 lb 9.6 oz (94.6 kg)   LMP 10/02/2018 (Approximate)   BMI 32.67 kg/m²   GENERAL: well developed, well nourished,in no apparent distress  SKIN: no rashes,no suspicious lesions  HEAD: atraumatic, normocephalic.  no tenderness on palpation of maxillary or frontal sinuses  EYES: conjunctiva clear, EOM intact  EARS: TM's clear, no bulging, no retraction,no fluid  NOSE: Nostrils patent, no nasal discharge, nasal mucosa pale pink  THROAT: Oral mucosa pink, moist. Posterior pharynx is mild erythematous. PND,no exudates.     NECK: Supple, non-tender  LUNGS: clear to auscultation bilaterally, no wheezes or rhonchi. Breathing is non labored. Hacky cough noted.  CARDIO: RRR without murmur  LYMPH:  mild cervical lymphadenopathy.        ASSESSMENT AND PLAN:   Ngozi Mathew is a 53 year old female who presents with   Encounter Diagnosis   Name Primary?    Acute lower respiratory infection Yes       No orders of the defined types were placed in this encounter.      Meds & Refills for this Visit:  Requested Prescriptions     Signed Prescriptions Disp Refills    azithromycin (ZITHROMAX Z-FARHAT) 250 MG Oral Tab 6 tablet 0     Sig: Take 2 tablets (500 mg total) by mouth daily for 1 day, THEN 1 tablet (250 mg  total) daily for 4 days.    montelukast (SINGULAIR) 10 MG Oral Tab 30 tablet 1     Sig: Take 1 tablet (10 mg total) by mouth nightly.       Imaging & Consults:  None        - start Z-zo, take with food.  - also start Singulair, take nightly  -Sleep with head elevated at nightime if coughing    -Push fluids, tea with honey and plenty of rest   -Limit dairy, to avoid increased congestion  -OTC cough medicine such as Robitussin DM   -OTC Tylenol/Ibuprofen   -an antihistamine like xyzal for post nasal drip.  Call if symptoms worsen or do not improve. Consider symbicort if still coughing. May need to see an allergist.  Patient verbalizes understanding of the treatment plan.

## 2024-05-22 RX ORDER — MONTELUKAST SODIUM 10 MG/1
10 TABLET ORAL NIGHTLY
Qty: 90 TABLET | Refills: 0 | OUTPATIENT
Start: 2024-05-22

## 2024-06-14 ENCOUNTER — TELEMEDICINE (OUTPATIENT)
Dept: INTERNAL MEDICINE CLINIC | Facility: CLINIC | Age: 54
End: 2024-06-14
Payer: COMMERCIAL

## 2024-06-14 DIAGNOSIS — E66.9 OBESITY (BMI 30-39.9): ICD-10-CM

## 2024-06-14 DIAGNOSIS — Z51.81 THERAPEUTIC DRUG MONITORING: Primary | ICD-10-CM

## 2024-06-14 DIAGNOSIS — K76.0 FATTY LIVER: ICD-10-CM

## 2024-06-14 DIAGNOSIS — F41.9 ANXIETY: ICD-10-CM

## 2024-06-14 DIAGNOSIS — R73.03 PREDIABETES: ICD-10-CM

## 2024-06-14 DIAGNOSIS — E78.00 PURE HYPERCHOLESTEROLEMIA: ICD-10-CM

## 2024-06-14 PROCEDURE — 99213 OFFICE O/P EST LOW 20 MIN: CPT | Performed by: NURSE PRACTITIONER

## 2024-06-14 RX ORDER — TIRZEPATIDE 7.5 MG/.5ML
7.5 INJECTION, SOLUTION SUBCUTANEOUS WEEKLY
Qty: 2 ML | Refills: 1 | Status: SHIPPED | OUTPATIENT
Start: 2024-06-14

## 2024-06-14 NOTE — PATIENT INSTRUCTIONS
Next steps:  1.  Fill your prescribed medication and take as discussed and prescribed: zepbound 5mg weekly x 4 weeks and then increase 7.5mg weekly   2.  Schedule a personal nutrition consultation with one of our registered dieticians     Please try to work on the following dietary changes:    1.  Drink water with meals and throughout the day, cut down on soda and/or juice if consumed. Consider flavored water options like Bubbly, Spindrift, Hint and Francisco J.  2.  Eat breakfast daily and focus on having protein with each meal, examples include: greek yogurt, cottage cheese, hard boiled egg, whole grain toast with peanut butter.   3.  Reduce refined carbohydrates and sugars which includes items such as sweets, as well as rice, pasta, and bread and make sure to choose whole grain options when having them with just 1 serving per meal about the size of your inner palm.  4.  Consume non starchy veggies daily working towards making them a good 50% of your daily food intake. Add them to lunch and dinner consistently.  5.  Start a daily probiotic: VSL#3 is recommended, (order on line at www.vsl3.com). Take 1 capsule daily with water for 30 days, then reduce to 1 every other day (this will reduce the cost). Capsules can be left out for 2 weeks, but then must be refrigerated.      Please download rocky My Fitness Pal, LoseIt! Or Net Diary to monitor daily dietary intake and you will be able to see if you are eating the right amount of calories or too much or too little which would hinder weight loss. Additionally this will help to see your daily carbohydrate and protein intake. When you set the rocky up choose 1-2 lbs/week as a goal.  Keeping a paper food journal is an option as well to remain accountable for your choices- this is the start to mindful eating! A low calorie diet has been consistently shown to support weight loss.     Continue or start exercising to help establish a routine. If not already exercising begin with 1  day and progress as able with long-term goal of 30 minutes 5 days a week at a minimum.     Meditation daily can help manage and control stress. Chronic stress can make weight loss difficult.  Exercising is one way to help with stress, but meditation using the CALM Sara or another comparable alternative can be done in your home or place of work with little time commitment. This Sara can also help work on behavior change and improve sleep. Check out the segment under Calm Masterclass and listen to The 4 Pillars of Health. A great way to begin learning about the foundation of lifestyle with practical tips to use in your every day.     Check out www.yourweightmatters.org blog for continued daily support and education along this weight loss journey!    Patient Resources:     Personal Training/Fitness Classes/Health Coaching     [facility] @ [email or web address] Full fitness center with group fitness and personal training. Discount available as client of [facility].  Health Coaching and Personal Training with Yeimi Pablo at our [facility]- individual weekly coaching with option to add personal training and small group fitness classes targeted at weight loss- [phone] and/or email @ [email or web address]  360FIT [place] [email or web address]. Group Fitness [phone] and/or email Kaylah at [email or web address]  [facility] Fitness Centers with multiple locations: Sympoz (www.The Volatility Fund), Eat The BuildersCloud Fitness (www.SingleHop.Podcast Ready), Fit Body Bootcamp (www.D.[employer] Loans & Servicesbodybootcamp.Podcast Ready), FÃ¤ltcommunications AB Fitness (www.Statusly.Podcast Ready), The Exercise  (www.exercisecoach.Podcast Ready)     Online Fitness  Fitness  on Utube  Fit in 10 DVD series- www.txfmr63QVP.com  Sit and Be Fit - Chair exercise series Www.sitandbefit.org  Hip Hop Fit with Eladio Price at www.hiphopfit.net     Apps for on the  Go Fitness  DoPay 7 Minute Workout (orange box with white 7) - free on the go HIIT training sara  Peloton Sara @ www.onepeloton.com     Nutrition Trackers and Tools  LoseIT! And My Fitness Pal apps and on line for tracking nutrition  NOOM - virtual health coaching  FitFoundation (healthy meals on the go) in [place] @ www.fdxvveedpelcq7n.Talari Networks  Bistro MD @ www.bistromd.com and Qhbhss49 (keto and low carb plans recommended) @ www.xupxxc94.com, Metabolic Meals @ www.MyMetabolicMeals.[employer] - individual prepared meals to go  Gobble, Blue Apron, Home , Every Plate, Sunbasket- on line meal delivery programs for preparation at home  Meal Village in [place] for homemade meals to go @ wwwVisualtisingmealvillage.[employer] Networks  Diet Doctor @ www.dietdoctor.[employer] - low carb swaps  YuArthaYantra - meal prep and planning sara (www.yummly.com)     Stress Management/Behavior/Mindful Eating  CALM meditation sara (www.calm.com)  Headspace  Am I Hungry? Mindful eating virtual  sara  Www.yourweightmatters.org - Obesity Action Coalition sponsored Blog posts daily  Motivation sara (black box with white \")- daily supportive messages sent to your phone     Books/Video Education/Podcasts  Mindless Eating by Mayank Bruno  Why We Get Sick by Juan M Esquivel (a book about insulin resistance)  Atomic Habits by David Lawson (a book about taking small steps to promote greater behavior change)   Can't Hurt Me by Waldo Burgos (a book exploring the power of discipline in achieving your goals)  The End of Dieting: How to Live for Life by Dr. Darwin Gonzalez M.D. or listen to The Meituan.com Podcast Episode 63: Understanding \"Nutritarian\" Eating w/Dr. Darwin Gonzalez  Your Body in Balance: The New Science of Food, Hormones, and Health by Dr. Sanchez Elena  The Menopause Diet Plan by Rebecca Ahmadi and Lena Perez  The Complete Guide to fasting by Dr. Musa  Sugar, Salt & Fat by Kennedi Nielsen, Ph.D, R.D.  Weight Loss Surgery Will Not Treat Food Addiction by Yessi Canada  Ph.D  The Game Changers- [employer] Documentary on plant based nutrition  Fed Up - documentary about obesity (Free on Utube)  The Truth About Sugar - documentary on sugar (Free on Utube, https://youtu.be/1G4zszqYX5k)  The Dr. Funes T5 Wellness Plan by Dr. Paulo Funes MD  Fitlosophy Fitspiration - journal to better health (found at [employer] in fitness aisle)  What Happened to You?- a look at the impact trauma has on behavior written by Lisa Auguste and Dr. Juvenal Paz  Whole Again by Forrest Nguyễn - discovering your true self after trauma  Dorene Robbins talk on [employer], The Call to Courage  Podcasts: The Exam Room by the Physician's Committee, Nutrition Facts by Dr. Ram    We are here to support you with weight loss, but please remember that you still need your primary care provider for your routine health maintenance.

## 2024-06-14 NOTE — PROGRESS NOTES
[facility] WEIGHT MANAGEMENT VIRTUAL ENCOUNTER     Ngozi Mathew verbally consents to a Virtual/Telephone Check-In service on 06/14/24   Patient understands and accepts financial responsibility for any deductible, co-insurance and/or co-pays associated with this service.    HISTORY OF PRESENT ILLNESS  Chief Complaint   Patient presents with    Other     F/u on weight management      Ngozi Mathew is a 53 year old female is being evaluated as a video visit using Telemedicine with live, interactive video and audio    Weight gain/loss since LOV based on home monitoring:   Home scale: 204  Has gained  #3 lbs since LOV 3 months ago     Compliance with medication: zepbound 2.5mg weekly (had to reduce dose due to pharmacy shortages)   Tolerating well, helping with decreasing appetite and no side effects     Recently went on vacation (gained a couple of pounds, but felt like she did a lot of walking)   [place] hunger come back when off of injection (due to shortages)  Exercise/Activity: 4-5x/ week, via walking, has been trying to do more strength training exercises (leg, core and arms)   Nutrition: eating regular meals, +protein, minimal veggies. +interm tracking reports  Stress is manageable-7/10   Sleep: 8 hours/night, waking up feeling rested    Denies chest pain, shortness of breath, dizziness, blurred vision, headache, paresthesia, nausea/vomiting.     Wt Readings from Last 6 Encounters:   05/21/24 208 lb 9.6 oz (94.6 kg)   05/10/24 198 lb (89.8 kg)   04/25/24 198 lb (89.8 kg)   04/17/24 206 lb (93.4 kg)   04/11/24 205 lb 6.4 oz (93.2 kg)   12/14/23 201 lb (91.2 kg)          Subjective  REVIEW OF SYSTEMS  GENERAL HEALTH: feels well otherwise, denied any fevers chills or night sweats   RESPIRATORY: denies shortness of breath   CARDIOVASCULAR: denies chest pain  GI: denies abdominal pain  PSYCH: denies any mood changes    Objective  EXAM  Reviewed most recent set of vitals   Physical Exam:  GENERAL: well  developed, well nourished, in no apparent distress, speaking in full sentences comfortably   SKIN: warm, pink, dry without rashes to exposed area   EYES: conjunctiva pink  HEENT: atraumatic, normocephalic  LUNGS: normal work of breathing, non labored  CARDIO: normal work, no exertion  EXTREMITIES: no cyanosis, no clubbing, no edema  NEURO: Oriented times three  PSYCH: pleasant, cooperative, normal mood and affect    Lab Results   Component Value Date    WBC 12.2 (H) 04/11/2024    RBC 4.44 04/11/2024    HGB 13.7 04/11/2024    HCT 40.4 04/11/2024    MCV 91.0 04/11/2024    MCH 30.9 04/11/2024    MCHC 33.9 04/11/2024    RDW 13.9 04/11/2024    .0 04/11/2024     Lab Results   Component Value Date     (H) 11/12/2023    BUN 17 11/12/2023    BUNCREA 16.0 06/01/2021    CREATSERUM 1.06 (H) 11/12/2023    ANIONGAP 5 11/12/2023    GFRNAA 74 04/16/2022    GFRAA 85 04/16/2022    CA 9.0 11/12/2023    OSMOCALC 288 11/12/2023    ALKPHO 97 11/12/2023    AST 9 (L) 11/12/2023    ALT 24 11/12/2023    BILT 0.6 11/12/2023    TP 8.0 11/12/2023    ALB 3.8 11/12/2023    GLOBULIN 4.2 11/12/2023     11/12/2023    K 4.0 11/12/2023     11/12/2023    CO2 26.0 11/12/2023     Lab Results   Component Value Date     12/07/2023    A1C 6.0 (H) 12/07/2023     Lab Results   Component Value Date    CHOLEST 240 (H) 12/07/2023    TRIG 127 12/07/2023    HDL 50 12/07/2023     (H) 12/07/2023    VLDL 25 12/07/2023    TCHDLRATIO 4.9 (H) 06/21/2013    NONHDLC 190 (H) 12/07/2023     Lab Results   Component Value Date    T4F 0.9 10/06/2021    TSH 1.370 05/26/2023     Lab Results   Component Value Date    B12 1,403 (H) 04/11/2024     Lab Results   Component Value Date    VITD 38.7 04/11/2024       Current Outpatient Medications on File Prior to Visit   Medication Sig Dispense Refill    montelukast (SINGULAIR) 10 MG Oral Tab Take 1 tablet (10 mg total) by mouth nightly. 30 tablet 1    Tirzepatide-Weight Management (ZEPBOUND) 2.5  MG/0.5ML Subcutaneous Solution Auto-injector Inject 2.5 mg into the skin once a week. 2 mL 0    levocetirizine 5 MG Oral Tab Take 1 tablet (5 mg total) by mouth every evening.      estradiol (ESTRACE) 0.5 MG Oral Tab Take 1 tablet (0.5 mg total) by mouth daily. 90 tablet 1    ALPRAZolam (XANAX) 0.25 MG Oral Tab Take 1 tablet (0.25 mg total) by mouth nightly as needed for Anxiety. (Patient not taking: Reported on 4/17/2024) 20 tablet 0    Tirzepatide-Weight Management (ZEPBOUND) 5 MG/0.5ML Subcutaneous Solution Auto-injector Inject 5 mg into the skin once a week. (Patient not taking: Reported on 5/21/2024) 2 mL 1    azelastine 0.1 % Nasal Solution 2 sprays by Nasal route 2 (two) times daily as needed. 90 mL 3    metFORMIN 500 MG Oral Tab Take 1 tablet (500 mg total) by mouth 2 (two) times daily with meals. 180 tablet 3    liothyronine 5 MCG Oral Tab Take 1 tablet (5 mcg total) by mouth daily. 90 tablet 3    AUVI-Q 0.3 MG/0.3ML Injection Solution Auto-injector Inject 0.3 mL (1 each total) as directed as needed. 1 each 3    Cholecalciferol (VITAMIN D) 1000 UNITS Oral Tab Take 1,000 Units by mouth daily.        Acidophilus/Pectin Oral Cap Take 1 capsule by mouth daily.       No current facility-administered medications on file prior to visit.       ASSESSMENT  Analyzed weight data:       Diagnoses and all orders for this visit:    Therapeutic drug monitoring    Obesity (BMI 30-39.9)    Pure hypercholesterolemia    Prediabetes    Fatty liver    Anxiety        PLAN  Continue with medications: Zepbound 5mg weekly (is paying out of pocket) and then increase 7.5mg weekly   Will continue with medications: metformin 500mg bid from PCP   --advised of side effects and adverse effects of this medication  Contradictions: has done ozempic in the past without good results, phentermine    Reviewed labs  HLD stable on current medication regimen, continue to follow-up with PCP  Fatty liver, lifestyle education given  DM type 2,  reviewed last a1c 6.0% on 12/2023-  with continue with metformin  Continue with physical activity and exercise   Wrote out macros and encouraged tracking food   Advised to monitor blood pressure and pulse at home/ given parameters to review and contact provider.  Nutrition: low carb diet/ recommended to eat breakfast daily/ regular protein intake  Follow up with dietitian and psychologist as recommended.  Discussed the role of sleep and stress in weight management.  Counseled on comprehensive weight loss plan including attention to nutrition, exercise and behavior/stress management for success. See patient instruction below for more details.  Discussed strategies to overcome barriers to successful weight loss and weight maintenance  FITTE: ACSM recommendations (150-300 minutes/ week in active weight loss)       There are no Patient Instructions on file for this visit.    No follow-ups on file.    Patient verbalizes understanding.    Total time spent on chart review, pre-charting, obtaining history, counseling, and educating, reviewing labs was 22 minutes.       Pt understands phone/video evaluation is not a substitute for face to face examination or emergency care. Pt advised to go to the ER or call 911 for worsening symptoms or acute distress.       Please note that the following visit was completed using two-way, real-time interactive audio and/or video communication.  This has been done in good terra to provide continuity of care in the best interest of the provider-patient relationship, due to the ongoing public health crisis/national emergency and because of restrictions of visitation.  There are limitations of this visit as no physical exam could be performed.  Every conscious effort was taken to allow for sufficient and adequate time.  This billing was spent on reviewing labs, medications, radiology tests and decision making.  Appropriate medical decision-making and tests are ordered as detailed in the plan  of care above.     NOTE TO PATIENT: The 21st Century Cures Act makes clinical notes like these available to patients in the interest of transparency. Clinical notes are medical documents used by physicians and care providers to communicate with each other. These documents include medical language and terminology, abbreviations, and treatment information that may sound technical and at times possibly unfamiliar. In addition, at times, the verbiage may appear blunt or direct. These documents are one tool providers use to communicate relevant information and clinical opinions of the care providers in a way that allows common understanding of the clinical context.     Aby Palacios, APRN  6/14/2024

## 2024-07-08 ENCOUNTER — PATIENT MESSAGE (OUTPATIENT)
Dept: INTERNAL MEDICINE CLINIC | Facility: CLINIC | Age: 54
End: 2024-07-08

## 2024-07-08 DIAGNOSIS — E66.9 OBESITY (BMI 30-39.9): Primary | ICD-10-CM

## 2024-07-08 NOTE — TELEPHONE ENCOUNTER
Requesting zepbound increase  LOV: 6/14/24  RTC: not noted  Last Relevant Labs: na  Filled: 6/14/24 #2ml with 1 refills    Future Appointments   Date Time Provider Department Center   11/7/2024  1:20 PM Aby Palacios APRN [facility] [id number]

## 2024-07-08 NOTE — TELEPHONE ENCOUNTER
From: Ngozi Mathew  To: Aby Palacios  Sent: 7/8/2024 11:10 AM CDT  Subject: Medication     Hi, I have the 7.5 mg. Zepbound, I would like to go up to the next dose next, can I get that sent to the pharmacy?

## 2024-07-09 RX ORDER — TIRZEPATIDE 10 MG/.5ML
10 INJECTION, SOLUTION SUBCUTANEOUS WEEKLY
Qty: 2 ML | Refills: 2 | Status: SHIPPED | OUTPATIENT
Start: 2024-07-09

## 2024-07-10 ENCOUNTER — MED REC SCAN ONLY (OUTPATIENT)
Facility: CLINIC | Age: 54
End: 2024-07-10

## 2024-07-15 ENCOUNTER — MED REC SCAN ONLY (OUTPATIENT)
Facility: CLINIC | Age: 54
End: 2024-07-15

## 2024-07-17 ENCOUNTER — MOBILE ENCOUNTER (OUTPATIENT)
Dept: INTERNAL MEDICINE CLINIC | Facility: CLINIC | Age: 54
End: 2024-07-17

## 2024-07-17 DIAGNOSIS — J22 ACUTE LOWER RESPIRATORY INFECTION: ICD-10-CM

## 2024-07-17 RX ORDER — MONTELUKAST SODIUM 10 MG/1
10 TABLET ORAL NIGHTLY
Qty: 90 TABLET | Refills: 1 | Status: SHIPPED | OUTPATIENT
Start: 2024-07-17

## 2024-07-17 NOTE — TELEPHONE ENCOUNTER
Montelukast 10 mg    Asthma & COPD Medication Protocol [id number]/17/2024 11:43 AM   Protocol Details Asthma Action Score greater than or equal to 20    AAP/ACT given in last 12 months      Filled 5-21-24  Qty 30  1 refill  Future Appointments   Date Time Provider Department Center   11/7/2024  1:20 PM Aby Palacios APRN [facility] [id number]   LOV 5-21-24

## 2024-07-18 RX ORDER — EPINEPHRINE 0.3 MG/.3ML
0.3 INJECTION, SOLUTION INTRAMUSCULAR AS NEEDED
Qty: 1 EACH | Refills: 3 | Status: SHIPPED | OUTPATIENT
Start: 2024-07-18

## 2024-07-18 NOTE — PROGRESS NOTES
Received page.  Patient reported being positive for COVID.  Symptoms started on Monday night.  Mild symptoms.  No acute shortness of breath.  Patient requesting Paxlovid.  Medication sent to the pharmacy.  Discussed when to seek urgent medical attention.  Voiced understanding.

## 2024-07-30 ENCOUNTER — HOSPITAL ENCOUNTER (OUTPATIENT)
Age: 54
Discharge: HOME | End: 2024-07-30
Payer: COMMERCIAL

## 2024-07-30 VITALS
TEMPERATURE: 98 F | HEART RATE: 97 BPM | OXYGEN SATURATION: 98 % | RESPIRATION RATE: 16 BRPM | SYSTOLIC BLOOD PRESSURE: 134 MMHG | DIASTOLIC BLOOD PRESSURE: 82 MMHG

## 2024-07-30 DIAGNOSIS — J32.0 CHRONIC MAXILLARY SINUSITIS: Primary | ICD-10-CM

## 2024-07-30 PROCEDURE — 99213 OFFICE O/P EST LOW 20 MIN: CPT | Performed by: NURSE PRACTITIONER

## 2024-07-30 RX ORDER — DOXYCYCLINE HYCLATE 100 MG/1
100 CAPSULE ORAL 2 TIMES DAILY
Qty: 20 CAPSULE | Refills: 0 | Status: SHIPPED | OUTPATIENT
Start: 2024-07-30 | End: 2024-08-09

## 2024-07-30 NOTE — ED INITIAL ASSESSMENT (HPI)
X2 days Pt c/o HA, sinus pain/pressure, fatigue.    Denies: fevers    Pt COVID + in the last 90 days.

## 2024-07-31 NOTE — ED PROVIDER NOTES
Patient Seen in: [facility]      History     Chief Complaint   Patient presents with    Sinus Problem     Possible sinus infection - Entered by patient    Headache     Stated Complaint: Sinus Problem - Possible sinus infection    Subjective:   HPI  54-year-old female presents to me care with complaints of worsening sinus pain pressure congestion.  Patient has pain to the sinus area also to the teeth.  Patient that she does have chronic sinus infections.  Denies any blurred vision double vision.  No headache or dizziness.  Patient is not concerned of COVID.  Patient has no other issues with concerns.  The patient's medication list, past medical history and social history elements as listed in today's nurse's notes were reviewed and agreed (except as otherwise stated in the HPI).  The patient's family history reviewed and determined to be noncontributory to the presenting problem.      Objective:   Past Medical History:    Abdominal distention    Allergic rhinitis    Anemia    Bloating    Body piercing    Brachial neuritis or radiculitis NOS    Diabetes mellitus (HCC)    Disorder of thyroid    Dizziness and giddiness    Fatigue    Fibroid uterus    Fibroids    Fibromyalgia    Frequency of urination    Heartburn    Hypothyroidism    Menorrhagia    Multiple thyroid nodules    Other abnormal glucose    Other and unspecified hyperlipidemia    Other malaise and fatigue    Peripheral vertigo, unspecified    Prediabetes    Seasonal allergies    Vertigo    (on and off for 10 yrs)    Visual impairment    contact lenses    Wears glasses              Past Surgical History:   Procedure Laterality Date    Colonoscopy      Hysterectomy  2018          Other surgical history  06/10/2021    removal of lipoma on back     Other surgical history  2022    sinus & nasal surgery    Skin surgery      Vaginal hysterectomy N/A 2018    Procedure: VAGINAL HYSTERECTOMY;  Surgeon: Zeyad Kiran MD;   Location:  MAIN OR                Social History     Socioeconomic History    Marital status:    Tobacco Use    Smoking status: Former     Current packs/day: 0.00     Average packs/day: 0.3 packs/day for 15.0 years (3.8 ttl pk-yrs)     Types: Cigarettes     Start date: 1989     Quit date: 2004     Years since quittin.5    Smokeless tobacco: Never   Vaping Use    Vaping status: Never Used   Substance and Sexual Activity    Alcohol use: Yes     Comment: Less than one per week    Drug use: No    Sexual activity: Yes     Partners: Male     Birth control/protection: Hysterectomy   Other Topics Concern    Caffeine Concern No    Stress Concern No    Weight Concern Yes    Special Diet No    Exercise Yes    Seat Belt Yes     Social Determinants of Health      Received from [facility], [facility]    Social Connections    Received from [facility], [facility]    Housing Stability              Review of Systems    Positive for stated Chief Complaint: Sinus Problem (Possible sinus infection - Entered by patient) and Headache    Other systems are as noted in HPI.  Constitutional and vital signs reviewed.      All other systems reviewed and negative except as noted above.    Physical Exam     ED Triage Vitals   BP 24 1832 134/82   Pulse 24 1830 97   Resp 24 1830 16   Temp 24 1830 98.3 °F (36.8 °C)   Temp src 24 1830 Temporal   SpO2 24 1830 98 %   O2 Device 24 1830 None (Room air)       Current Vitals:   Vital Signs  BP: 134/82  Pulse: 97  Resp: 16  Temp: 98.3 °F (36.8 °C)  Temp src: Temporal    Oxygen Therapy  SpO2: 98 %  O2 Device: None (Room air)            Physical Exam    GENERAL: The patient is well-developed well-nourished nontoxic, non-ill-appearing  HEENT: Normocephalic.  Atraumatic.  Extraocular motions are intact, tenderness is noted to the maxillary and frontal sinus area  NECK:  Supple.  No meningitic signs are noted.   CHEST/LUNGS: Clear to auscultation.  There is no respiratory distress noted.  HEART/CARDIOVASCULAR: Regular.  There is no tachycardia.   SKIN: There is no rash.  NEURO: The patient is awake, alert, and oriented.  The patient is cooperative.    ED Course   Labs Reviewed - No data to display                 MDM   Pertinent Labs & Imaging studies reviewed. (See chart for details)  Differential diagnosis considered but not limited to: Sinus infection viral sinus infection bacterial sinus headache viral URI  Patient coming in with sinus pressure congestion.  . Will treat for possible sinus infection. Will discharge on doxycycline. Patient is comfortable with this plan.  Overall Pt looks good. Non-toxic, well-hydrated and in no respiratory distress. Vital signs are reassuring. Exam is reassuring. I do not believe pt  requires and additional  diagnostic studiesor intervention. I believe pt  can be discharged home to continue evaluation as an outpatient. Follow-up provider given. Discharge instructions given and reviewed. Return for any problems. All understand and agreewith the plan.    Please note that this report has been produced using speech recognition software and may contain errors related to that system including, but not limited to, errors in grammar, punctuation, and spelling, as well as words and phrases that possibly may have been recognized inappropriately.  If there are any questions or concerns, contact the dictating provider for clarification.    Note to patient: The 21st Century Cures Act makes medical notes like these available to patients in the interest of transparency. However, this is a medical document intended as peer to peer communication. It is written in medical language and may contain abbreviations or verbiage that are unfamiliar. It may appear blunt or direct. Medical documents are intended to carry relevant information, facts as evident, and the  clinical opinion of the practitioner.                                    Medical Decision Making      Disposition and Plan     Clinical Impression:  1. Chronic maxillary sinusitis         Disposition:  Discharge  7/30/2024  7:12 pm    Follow-up:  Ann Vidal MD  [address] [place] [id number] [phone]                Medications Prescribed:  Discharge Medication List as of 7/30/2024  7:12 PM        START taking these medications    Details   doxycycline 100 MG Oral Cap Take 1 capsule (100 mg total) by mouth 2 (two) times daily for 10 days., Normal, Disp-20 capsule, R-0

## 2024-08-23 ENCOUNTER — OFFICE VISIT (OUTPATIENT)
Dept: INTERNAL MEDICINE CLINIC | Facility: CLINIC | Age: 54
End: 2024-08-23
Payer: COMMERCIAL

## 2024-08-23 ENCOUNTER — LAB ENCOUNTER (OUTPATIENT)
Dept: LAB | Age: 54
End: 2024-08-23
Attending: INTERNAL MEDICINE
Payer: COMMERCIAL

## 2024-08-23 VITALS
RESPIRATION RATE: 16 BRPM | WEIGHT: 206 LBS | HEIGHT: 67 IN | TEMPERATURE: 98 F | HEART RATE: 100 BPM | BODY MASS INDEX: 32.33 KG/M2 | DIASTOLIC BLOOD PRESSURE: 80 MMHG | OXYGEN SATURATION: 98 % | SYSTOLIC BLOOD PRESSURE: 126 MMHG

## 2024-08-23 DIAGNOSIS — G62.89 OTHER POLYNEUROPATHY: ICD-10-CM

## 2024-08-23 DIAGNOSIS — G62.89 OTHER POLYNEUROPATHY: Primary | ICD-10-CM

## 2024-08-23 LAB
ALBUMIN SERPL-MCNC: 4.5 G/DL (ref 3.2–4.8)
ALBUMIN/GLOB SERPL: 1.7 {RATIO} (ref 1–2)
ALP LIVER SERPL-CCNC: 81 U/L
ALT SERPL-CCNC: 20 U/L
ANION GAP SERPL CALC-SCNC: 4 MMOL/L (ref 0–18)
AST SERPL-CCNC: 15 U/L (ref ?–34)
BASOPHILS # BLD AUTO: 0.13 X10(3) UL (ref 0–0.2)
BASOPHILS NFR BLD AUTO: 1 %
BILIRUB SERPL-MCNC: 0.3 MG/DL (ref 0.3–1.2)
BUN BLD-MCNC: 14 MG/DL (ref 9–23)
CALCIUM BLD-MCNC: 10.1 MG/DL (ref 8.7–10.4)
CHLORIDE SERPL-SCNC: 103 MMOL/L (ref 98–112)
CO2 SERPL-SCNC: 33 MMOL/L (ref 21–32)
CREAT BLD-MCNC: 0.9 MG/DL
EGFRCR SERPLBLD CKD-EPI 2021: 75 ML/MIN/1.73M2 (ref 60–?)
EOSINOPHIL # BLD AUTO: 0.18 X10(3) UL (ref 0–0.7)
EOSINOPHIL NFR BLD AUTO: 1.4 %
ERYTHROCYTE [DISTWIDTH] IN BLOOD BY AUTOMATED COUNT: 13.3 %
FASTING STATUS PATIENT QL REPORTED: NO
GLOBULIN PLAS-MCNC: 2.6 G/DL (ref 2–3.5)
GLUCOSE BLD-MCNC: 101 MG/DL (ref 70–99)
HCT VFR BLD AUTO: 41.1 %
HGB BLD-MCNC: 13.5 G/DL
IMM GRANULOCYTES # BLD AUTO: 0.07 X10(3) UL (ref 0–1)
IMM GRANULOCYTES NFR BLD: 0.6 %
LYMPHOCYTES # BLD AUTO: 3.46 X10(3) UL (ref 1–4)
LYMPHOCYTES NFR BLD AUTO: 27.5 %
MCH RBC QN AUTO: 29.9 PG (ref 26–34)
MCHC RBC AUTO-ENTMCNC: 32.8 G/DL (ref 31–37)
MCV RBC AUTO: 90.9 FL
MONOCYTES # BLD AUTO: 0.76 X10(3) UL (ref 0.1–1)
MONOCYTES NFR BLD AUTO: 6.1 %
NEUTROPHILS # BLD AUTO: 7.96 X10 (3) UL (ref 1.5–7.7)
NEUTROPHILS # BLD AUTO: 7.96 X10(3) UL (ref 1.5–7.7)
NEUTROPHILS NFR BLD AUTO: 63.4 %
OSMOLALITY SERPL CALC.SUM OF ELEC: 291 MOSM/KG (ref 275–295)
PLATELET # BLD AUTO: 372 10(3)UL (ref 150–450)
POTASSIUM SERPL-SCNC: 3.6 MMOL/L (ref 3.5–5.1)
PROT SERPL-MCNC: 7.1 G/DL (ref 5.7–8.2)
RBC # BLD AUTO: 4.52 X10(6)UL
SODIUM SERPL-SCNC: 140 MMOL/L (ref 136–145)
WBC # BLD AUTO: 12.6 X10(3) UL (ref 4–11)

## 2024-08-23 PROCEDURE — 3008F BODY MASS INDEX DOCD: CPT | Performed by: INTERNAL MEDICINE

## 2024-08-23 PROCEDURE — 3044F HG A1C LEVEL LT 7.0%: CPT | Performed by: INTERNAL MEDICINE

## 2024-08-23 PROCEDURE — 99214 OFFICE O/P EST MOD 30 MIN: CPT | Performed by: INTERNAL MEDICINE

## 2024-08-23 PROCEDURE — 80053 COMPREHEN METABOLIC PANEL: CPT | Performed by: INTERNAL MEDICINE

## 2024-08-23 PROCEDURE — 3074F SYST BP LT 130 MM HG: CPT | Performed by: INTERNAL MEDICINE

## 2024-08-23 PROCEDURE — 83036 HEMOGLOBIN GLYCOSYLATED A1C: CPT | Performed by: INTERNAL MEDICINE

## 2024-08-23 PROCEDURE — 85025 COMPLETE CBC W/AUTO DIFF WBC: CPT | Performed by: INTERNAL MEDICINE

## 2024-08-23 PROCEDURE — 3079F DIAST BP 80-89 MM HG: CPT | Performed by: INTERNAL MEDICINE

## 2024-08-23 RX ORDER — LIDOCAINE 4 G/G
1 PATCH TOPICAL EVERY 24 HOURS
Qty: 14 PATCH | Refills: 0 | Status: SHIPPED | OUTPATIENT
Start: 2024-08-23 | End: 2024-09-06

## 2024-08-23 NOTE — PATIENT INSTRUCTIONS
Please use sunscreen when you go to the sun.  I have ordered few blood tests and will follow them up.  In addition to lidocaine patch, you can take Tylenol or ibuprofen if pain disturbs you.  Weight Loss Advice :  A) Eat plant based diet and avoid ultra processed and fast foods.  B) Your portions should be a dinner plate. 1/2 should be vegetables, 1/4 lean protein (chicken, fish, turkey. Tofu), and 1/4 can be carbohydrates.   C)  Your main drink should be water rather than soda or alcohol or sweet coffees  D). Please try to exercise ( brisk walking or jogging) at least 30 minutes five times/week; and do muscle strengthening exercises ( lifting the weight, doing push ups or yoga)  twice a week    E). It is very important to get 7-9 hours of sleep per night

## 2024-08-23 NOTE — PROGRESS NOTES
Established Patient Office Visit    CC:    Left anterior chest burning feeling for last 8 days    HPI:     The patient is 54-year-old female with history of prediabetes, class I obesity to the clinic with burning feeling underneath her breasts.  As of this, the patient has hard time putting the bra.  She also complains of rashes when she goes to the sun.    Patient is a college .    Patient is up-to-date with mammogram and Pap smears.    Past Surgical History:   Procedure Laterality Date    Colonoscopy      Hysterectomy  2018          Other surgical history  06/10/2021    removal of lipoma on back     Other surgical history  2022    sinus & nasal surgery    Skin surgery      Vaginal hysterectomy N/A 2018    Procedure: VAGINAL HYSTERECTOMY;  Surgeon: Zeyad Kiran MD;  Location:  MAIN OR       Social History:  Social History     Socioeconomic History    Marital status:    Tobacco Use    Smoking status: Former     Current packs/day: 0.00     Average packs/day: 0.3 packs/day for 15.0 years (3.8 ttl pk-yrs)     Types: Cigarettes     Start date: 1989     Quit date: 2004     Years since quittin.6    Smokeless tobacco: Never   Vaping Use    Vaping status: Never Used   Substance and Sexual Activity    Alcohol use: Yes     Comment: Less than one per week    Drug use: No    Sexual activity: Yes     Partners: Male     Birth control/protection: Hysterectomy   Other Topics Concern    Caffeine Concern No    Stress Concern No    Weight Concern Yes    Special Diet No    Exercise Yes    Seat Belt Yes     Social Determinants of Health      [facility], [facility]    Social Connections    Received from [facility], [facility]    Housing Stability     Family History:  Family History   Problem Relation Age of Onset    Diabetes Sister     Ovarian Cancer Sister     Other  (ovarian cancer) Sister     Asthma Sister     Stroke Other     Cancer Other     Diabetes Father     Hypertension Father     Other (thyroid disease) Mother     Other (lymphoma) Mother     Dementia Mother     No Known Problems Sister     Stroke Paternal Grandmother     Asthma Son      Allergies:  Allergies   Allergen Reactions    Wasps SWELLING    Coffee Bean Extract [Coffea Arabica] OTHER (SEE COMMENTS)     Per allergy testing    [place] UNKNOWN     Swelling, inflammation, SOB    Erythromycin OTHER (SEE COMMENTS)     Abdominal pain    Other OTHER (SEE COMMENTS)     Cayenne pepper- SOB    Pcn [Penicillins] REACTIVE AIRWAY DISEASE    Pork Derived Products OTHER (SEE COMMENTS)     GI upset     Current Meds:  Current Outpatient Medications on File Prior to Visit   Medication Sig Dispense Refill    AUVI-Q 0.3 MG/0.3ML Injection Solution Auto-injector Inject 0.3 mL (1 each total) as directed as needed. 1 each 3    montelukast 10 MG Oral Tab TAKE 1 TABLET(10 MG) BY MOUTH EVERY NIGHT 90 tablet 1    Tirzepatide-Weight Management (ZEPBOUND) 10 MG/0.5ML Subcutaneous Solution Auto-injector Inject 10 mg into the skin once a week. 2 mL 2    levocetirizine 5 MG Oral Tab Take 1 tablet (5 mg total) by mouth every evening.      estradiol (ESTRACE) 0.5 MG Oral Tab Take 1 tablet (0.5 mg total) by mouth daily. 90 tablet 1    azelastine 0.1 % Nasal Solution 2 sprays by Nasal route 2 (two) times daily as needed. 90 mL 3    metFORMIN 500 MG Oral Tab Take 1 tablet (500 mg total) by mouth 2 (two) times daily with meals. 180 tablet 3    liothyronine 5 MCG Oral Tab Take 1 tablet (5 mcg total) by mouth daily. 90 tablet 3    Cholecalciferol (VITAMIN D) 1000 UNITS Oral Tab Take 1,000 Units by mouth daily.        Acidophilus/Pectin Oral Cap Take 1 capsule by mouth daily.      ALPRAZolam (XANAX) 0.25 MG Oral Tab Take 1 tablet (0.25 mg total) by mouth nightly as needed for Anxiety. (Patient not taking: Reported on 8/23/2024) 20 tablet 0     No current  facility-administered medications on file prior to visit.         REVIEW OF SYSTEMS   Constitutional: no fatigue, normal energy,  no weight changes   HENT: no headache, normal sinuses and no mouth issues   Eyes: . normal vision no eye pain  Respiratory: normal respirations no cough   Cardiovascular: no chest pain  or palpitations   Gastrointestinal: normal bowels and no abd pains   Genitourinary:  normal urination no hematuria, no frequency   Musculoskeletal: . No joint pain, no muscular pain  Skin: Burning feeling at the skin underneath the left breast  Neurological:  no weakness, no numbness, normal gait.  Hyperesthesia in his left breast.  Hematological:  no bruises or bleeding   Psychiatric/Behavioral: Normal mood    Temp 98 °F (36.7 °C) (Temporal)   Resp 16   Ht 5' 7\" (1.702 m)   Wt 206 lb (93.4 kg)   LMP 10/02/2018 (Approximate)   BMI 32.26 kg/m²     Lab Results   Component Value Date     12/07/2023    A1C 6.0 (H) 12/07/2023      Lab Results   Component Value Date    WBC 12.2 (H) 04/11/2024    RBC 4.44 04/11/2024    HGB 13.7 04/11/2024    HCT 40.4 04/11/2024    MCV 91.0 04/11/2024    MCH 30.9 04/11/2024    MCHC 33.9 04/11/2024    RDW 13.9 04/11/2024    .0 04/11/2024      No results found for this or any previous visit (from the past 4380 hour(s)).   Lab Results   Component Value Date    CHOLEST 240 (H) 12/07/2023    TRIG 127 12/07/2023    HDL 50 12/07/2023     (H) 12/07/2023    VLDL 25 12/07/2023    TCHDLRATIO 4.9 (H) 06/21/2013    NONHDLC 190 (H) 12/07/2023        PHYSICAL EXAM:   Constitutional: Vital signs reviewed as noted, well developed  Pleasant patient and appears their stated age  No palor, No icterus, No pedal edema, No lymphadenopathy  Cardiovascular: nl s1 s2 no murmur or gallop  Pulmonary/Chest: B/L equal air entry, vesicular breath sounds B/L, no wheezes or crackles  Skin: no rashes or bruises  Psychiatric:normal mood and behavior      ASSESSMENT AND PLAN:   No diagnosis  found.    Hyperesthesia at left lateral chest.  Cause is unclear.  Differentials are-B12 toxicity, shingles, diabetic polyneuropathy.  Lidocaine patch was prescribed.  As needed Tylenol or ibuprofen is advised.  If symptoms continue, I will order SPEP to rule out multiple myeloma/MGUS.  Today I am ordering CBC CMP and hemoglobin A1c.  I would like to see what the calcium level and kidney function tests.  Class I obesity: Continue with Zepbound.  Preventive medicine: Up-to-date with mammogram and Pap smear as well as colonoscopy.  Likely photosensitivity rashes: Today they are not visible.  Advised to use sunscreen when going to the sun.      Patient/Caregiver Education: Patient/Caregiver Education: There are no barriers to learning. Medical education done. Outcome: Patient verbalizes understanding. Patient is notified to call with any questions, complications, allergies, or worsening or changing symptoms.  Patient is to call with any side effects or complications from the treatments as a result of today.    No follow-ups on file.    Reviewed Past Medical History and   Patient Active Problem List   Diagnosis    Type 2 diabetes mellitus with complication, without long-term current use of insulin (HCC)    S/P laparoscopic hysterectomy- fibroids 11/2018    Class 1 obesity without serious comorbidity in adult    Fatty liver    Hip pain, chronic, right    Estrogen replacement therapy (ERT) - started 2020 age 49    Mixed hyperlipidemia    Family history of ovarian cancer - sister not familial     Internal hemorrhoids    External hemorrhoids without complication       No orders of the defined types were placed in this encounter.    Requested Prescriptions      No prescriptions requested or ordered in this encounter         Juan F Artis MD

## 2024-08-24 LAB
EST. AVERAGE GLUCOSE BLD GHB EST-MCNC: 126 MG/DL (ref 68–126)
HBA1C MFR BLD: 6 % (ref ?–5.7)

## 2024-10-13 ENCOUNTER — HOSPITAL ENCOUNTER (OUTPATIENT)
Age: 54
Discharge: HOME | End: 2024-10-13
Payer: COMMERCIAL

## 2024-10-13 VITALS
RESPIRATION RATE: 18 BRPM | HEART RATE: 83 BPM | OXYGEN SATURATION: 97 % | TEMPERATURE: 98 F | SYSTOLIC BLOOD PRESSURE: 122 MMHG | DIASTOLIC BLOOD PRESSURE: 83 MMHG

## 2024-10-13 DIAGNOSIS — J01.00 ACUTE NON-RECURRENT MAXILLARY SINUSITIS: Primary | ICD-10-CM

## 2024-10-13 DIAGNOSIS — E66.9 OBESITY (BMI 30-39.9): ICD-10-CM

## 2024-10-13 RX ORDER — DOXYCYCLINE 100 MG/1
100 CAPSULE ORAL 2 TIMES DAILY
Qty: 20 CAPSULE | Refills: 0 | Status: SHIPPED | OUTPATIENT
Start: 2024-10-13 | End: 2024-10-23

## 2024-10-13 NOTE — ED PROVIDER NOTES
Patient Seen in: [facility]      History     Chief Complaint   Patient presents with    Sinus Problem     Stated Complaint: congestion; facial pain; headache; left ear pain    Subjective:   HPI  54-year-old female presents to the [facility] with complaints of sinus pain pressure congestion for last 2 weeks.  P states she does have seasonal allergies and thinks her allergy meds are not working now developed a sinus infection.  Has pain to the frontal and maxillary sinus area.  No headache no dizziness no pain with eye movement.  No recent fever.  Patient has no other concerns at this time.  The patient's medication list, past medical history and social history elements as listed in today's nurse's notes were reviewed and agreed (except as otherwise stated in the HPI).  The patient's family history reviewed and determined to be noncontributory to the presenting problem.      Objective:     Past Medical History:    Abdominal distention    Allergic rhinitis    Anemia    Bloating    Body piercing    Brachial neuritis or radiculitis NOS    Diabetes mellitus (HCC)    Disorder of thyroid    Dizziness and giddiness    Fatigue    Fibroid uterus    Fibroids    Fibromyalgia    Frequency of urination    Heartburn    Hypothyroidism    Menorrhagia    Multiple thyroid nodules    Other abnormal glucose    Other and unspecified hyperlipidemia    Other malaise and fatigue    Peripheral vertigo, unspecified    Prediabetes    Seasonal allergies    Vertigo    (on and off for 10 yrs)    Visual impairment    contact lenses    Wears glasses              Past Surgical History:   Procedure Laterality Date    Colonoscopy      Hysterectomy  2018          Other surgical history  06/10/2021    removal of lipoma on back     Other surgical history  2022    sinus & nasal surgery    Skin surgery      Vaginal hysterectomy N/A 2018    Procedure: VAGINAL HYSTERECTOMY;  Surgeon: Zeyad Kiran MD;   Location:  MAIN OR                Social History     Socioeconomic History    Marital status:    Tobacco Use    Smoking status: Former     Current packs/day: 0.00     Average packs/day: 0.3 packs/day for 15.0 years (3.8 ttl pk-yrs)     Types: Cigarettes     Start date: 1989     Quit date: 2004     Years since quittin.7    Smokeless tobacco: Never   Vaping Use    Vaping status: Never Used   Substance and Sexual Activity    Alcohol use: Yes     Comment: Less than one per week    Drug use: No    Sexual activity: Yes     Partners: Male     Birth control/protection: Hysterectomy   Other Topics Concern    Caffeine Concern No    Stress Concern No    Weight Concern Yes    Special Diet No    Exercise Yes    Seat Belt Yes     Social Drivers of Health      Received from [facility], [facility]    Social Connections    Received from [facility], [facility]    Housing Stability              Review of Systems    Positive for stated complaint: congestion; facial pain; headache; left ear pain  Other systems are as noted in HPI.  Constitutional and vital signs reviewed.      All other systems reviewed and negative except as noted above.    Physical Exam     ED Triage Vitals [10/13/24 1414]   /83   Pulse 83   Resp 18   Temp 97.9 °F (36.6 °C)   Temp src Temporal   SpO2 97 %   O2 Device None (Room air)       Current Vitals:   Vital Signs  BP: 122/83  Pulse: 83  Resp: 18  Temp: 97.9 °F (36.6 °C)  Temp src: Temporal    Oxygen Therapy  SpO2: 97 %  O2 Device: None (Room air)        Physical Exam  GENERAL: The patient is well-developed well-nourished nontoxic, non-ill-appearing  HEENT: Normocephalic.  Atraumatic.  Extraocular motions are intact tenderness is noted to the maxillary and frontal sinus area tympanic membranes are air and fluid-filled  NECK: Supple.  No meningitic signs are noted.   CHEST/LUNGS: Clear to auscultation.  There  is no respiratory distress noted.  HEART/CARDIOVASCULAR: Regular.  There is no tachycardia.   SKIN: There is no rash.  NEURO: The patient is awake, alert, and oriented.  The patient is cooperative.    ED Course   Labs Reviewed - No data to display              MDM   Pertinent Labs & Imaging studies reviewed. (See chart for details)  Differential diagnosis considered but not limited to: Viral URI sinus infection headache  Patient coming in with sinus symptoms for 2 weeks.. Will treat for possible sinus infection. Will discharge on doxycycline. Patient is comfortable with this plan.  Overall Pt looks good. Non-toxic, well-hydrated and in no respiratory distress. Vital signs are reassuring. Exam is reassuring. I do not believe pt  requires and additional  diagnostic studiesor intervention. I believe pt  can be discharged home to continue evaluation as an outpatient. Follow-up provider given. Discharge instructions given and reviewed. Return for any problems. All understand and agreewith the plan.    Please note that this report has been produced using speech recognition software and may contain errors related to that system including, but not limited to, errors in grammar, punctuation, and spelling, as well as words and phrases that possibly may have been recognized inappropriately.  If there are any questions or concerns, contact the dictating provider for clarification.    Note to patient: The 21st Century Cures Act makes medical notes like these available to patients in the interest of transparency. However, this is a medical document intended as peer to peer communication. It is written in medical language and may contain abbreviations or verbiage that are unfamiliar. It may appear blunt or direct. Medical documents are intended to carry relevant information, facts as evident, and the clinical opinion of the practitioner.         Medical Decision Making      Disposition and Plan     Clinical Impression:  1. Acute  non-recurrent maxillary sinusitis         Disposition:  Discharge  10/13/2024  2:16 pm    Follow-up:  Ann Vidal MD  [address] [place] [id number] [phone]                Medications Prescribed:  Current Discharge Medication List              Supplementary Documentation:

## 2024-10-15 NOTE — TELEPHONE ENCOUNTER
Requesting   Requested Prescriptions     Pending Prescriptions Disp Refills    ZEPBOUND 10 MG/0.5ML Subcutaneous Solution Auto-injector [Pharmacy Med Name: ZEPBOUND 10MG/0.5ML INJ (4 PF PENS)] 2 mL 2     Sig: ADMINISTER 10 MG UNDER THE SKIN 1 TIME A WEEK     LOV: 06/14/2024 tele  RTC: n/a  Filled: 07/09/2024 #2ml with 2 refills    Future Appointments   Date Time Provider Department Center   10/30/2024  4:00 PM Bisi Pang,  [facility] [place]   11/7/2024  1:20 PM Aby Palacios, [id number]     Waiting

## 2024-10-18 RX ORDER — TIRZEPATIDE 10 MG/.5ML
INJECTION, SOLUTION SUBCUTANEOUS
Qty: 2 ML | Refills: 2 | Status: SHIPPED | OUTPATIENT
Start: 2024-10-18

## 2024-10-30 ENCOUNTER — OFFICE VISIT (OUTPATIENT)
Dept: INTERNAL MEDICINE CLINIC | Facility: CLINIC | Age: 54
End: 2024-10-30
Payer: COMMERCIAL

## 2024-10-30 VITALS
WEIGHT: 204.19 LBS | HEIGHT: 67 IN | TEMPERATURE: 98 F | DIASTOLIC BLOOD PRESSURE: 72 MMHG | HEART RATE: 87 BPM | BODY MASS INDEX: 32.05 KG/M2 | RESPIRATION RATE: 16 BRPM | SYSTOLIC BLOOD PRESSURE: 122 MMHG | OXYGEN SATURATION: 95 %

## 2024-10-30 DIAGNOSIS — E11.8 TYPE 2 DIABETES MELLITUS WITH COMPLICATION, WITHOUT LONG-TERM CURRENT USE OF INSULIN (HCC): ICD-10-CM

## 2024-10-30 DIAGNOSIS — E66.811 CLASS 1 OBESITY WITHOUT SERIOUS COMORBIDITY WITH BODY MASS INDEX (BMI) OF 32.0 TO 32.9 IN ADULT, UNSPECIFIED OBESITY TYPE: ICD-10-CM

## 2024-10-30 DIAGNOSIS — F43.9 STRESS: ICD-10-CM

## 2024-10-30 DIAGNOSIS — R42 LIGHTHEADEDNESS: Primary | ICD-10-CM

## 2024-10-30 PROCEDURE — 3008F BODY MASS INDEX DOCD: CPT | Performed by: INTERNAL MEDICINE

## 2024-10-30 PROCEDURE — G2211 COMPLEX E/M VISIT ADD ON: HCPCS | Performed by: INTERNAL MEDICINE

## 2024-10-30 PROCEDURE — 3078F DIAST BP <80 MM HG: CPT | Performed by: INTERNAL MEDICINE

## 2024-10-30 PROCEDURE — 3074F SYST BP LT 130 MM HG: CPT | Performed by: INTERNAL MEDICINE

## 2024-10-30 PROCEDURE — 99214 OFFICE O/P EST MOD 30 MIN: CPT | Performed by: INTERNAL MEDICINE

## 2024-10-30 RX ORDER — TIRZEPATIDE 7.5 MG/.5ML
7.5 INJECTION, SOLUTION SUBCUTANEOUS WEEKLY
Qty: 9 ML | Refills: 0 | Status: SHIPPED | OUTPATIENT
Start: 2024-10-30

## 2024-10-30 NOTE — PROGRESS NOTES
Patient Office Visit    ASSESSMENT AND PLAN:   1. Lightheadedness  Note: Discussed with patient that the symptoms are less likely cardiac related as it only lasts a few seconds.  This could be due to stress or even lack of eating as low sugar can certainly cause lightheadedness.  Recommended to keep eating on time and following up with her therapist.  If the symptoms are not improving then we can consider carotid ultrasound and echocardiogram.    2. Type 2 diabetes mellitus with complication, without long-term current use of insulin (HCC)  Note: Will switch from Zepbound to Mounjaro as patient is a diabetic and we will try a lower dose as her lightheadedness also started when she went up to 10 mg  - Tirzepatide (MOUNJARO) 7.5 MG/0.5ML Subcutaneous Solution Auto-injector; Inject 7.5 mg into the skin once a week.  Dispense: 9 mL; Refill: 0    3. Class 1 obesity without serious comorbidity with body mass index (BMI) of 32.0 to 32.9 in adult, unspecified obesity type  Note: Continue to work on diet and lifestyle modifications and will switch from Zepbound to Mounjaro    4. Stress  Note: Patient will start off with speaking to a therapist and also add physical activity    Return to clinic in 6 to 8 weeks      Patient/Caregiver Education: Patient/Caregiver Education: There are no barriers to learning. Medical education done. Outcome: Patient verbalizes understanding. Patient is notified to call with any questions, complications, allergies, or worsening or changing symptoms.  Patient is to call with any side effects or complications from the treatments as a result of today.      Reviewed Past Medical History and   Patient Active Problem List   Diagnosis    Type 2 diabetes mellitus with complication, without long-term current use of insulin (HCC)    S/P laparoscopic hysterectomy- fibroids 11/2018    Class 1 obesity without serious comorbidity in adult    Fatty liver    Hip pain, chronic, right    Estrogen replacement  therapy (ERT) - started 2020 age 49    Mixed hyperlipidemia    Family history of ovarian cancer - sister not familial     Internal hemorrhoids    External hemorrhoids without complication       No orders of the defined types were placed in this encounter.    Requested Prescriptions     Signed Prescriptions Disp Refills    Tirzepatide (MOUNJARO) 7.5 MG/0.5ML Subcutaneous Solution Auto-injector 9 mL 0     Sig: Inject 7.5 mg into the skin once a week.         Bisi Pang,   CC:  Chief Complaint   Patient presents with    Dizziness         HPI:   Ngozi Mathew is a 54-year-old female who presents for lightheadedness    Lightheadedness: Started about 5 months ago.  The first time it happened was when she was taking a long bath and she was talking to her family member about a stressful situation and as soon as she stood up she felt lightheaded for a few seconds.  The second time it happened while she was sitting for her mom's appointment and she felt a rush of lightheadedness again for few minutes.  Then the last time it happened while at work and she was sitting as well.  The episodes only last a few seconds and does not happen daily.  She was concerned about heart valve issues.  She has increased her Zepbound around the time the symptom started but the lightheadedness does not happen weekly.  She tries to eat on time but sometimes she does skip meals if she is in a hurry.  She has been in a lot of stress due to family stressors.  She is not sure if it is due to her stress as well and she was supposed to see a therapist but she never followed up because she was preoccupied with her mother's condition.  Obesity/diabetes: Her diabetes has been well-controlled however she is not losing much weight.  She is also paying a lot for Zepbound.  She is open to trying a different medicine that may be covered    Past Medical History:    Abdominal distention    Allergic rhinitis    Anemia    Bloating    Body piercing     Brachial neuritis or radiculitis NOS    Diabetes mellitus (HCC)    Disorder of thyroid    Dizziness and giddiness    Fatigue    Fibroid uterus    Fibroids    Fibromyalgia    Frequency of urination    Heartburn    Hypothyroidism    Menorrhagia    Multiple thyroid nodules    Other abnormal glucose    Other and unspecified hyperlipidemia    Other malaise and fatigue    Peripheral vertigo, unspecified    Prediabetes    Seasonal allergies    Vertigo    (on and off for 10 yrs)    Visual impairment    contact lenses    Wears glasses       Past Surgical History:   Procedure Laterality Date    Colonoscopy      Hysterectomy  2018          Other surgical history  06/10/2021    removal of lipoma on back     Other surgical history  2022    sinus & nasal surgery    Skin surgery      Vaginal hysterectomy N/A 2018    Procedure: VAGINAL HYSTERECTOMY;  Surgeon: Zeyad Kiran MD;  Location:  MAIN OR       Social History:  Social History     Socioeconomic History    Marital status:    Tobacco Use    Smoking status: Former     Current packs/day: 0.00     Average packs/day: 0.3 packs/day for 15.0 years (3.8 ttl pk-yrs)     Types: Cigarettes     Start date: 1989     Quit date: 2004     Years since quittin.8    Smokeless tobacco: Never   Vaping Use    Vaping status: Never Used   Substance and Sexual Activity    Alcohol use: Yes     Comment: Less than one per week    Drug use: No    Sexual activity: Yes     Partners: Male     Birth control/protection: Hysterectomy   Other Topics Concern    Caffeine Concern No    Stress Concern No    Weight Concern Yes    Special Diet No    Exercise Yes    Seat Belt Yes     Social Drivers of Health      [facility]    Social Connections    Received from [facility], [facility]    Housing Stability     Family History:  Family History   Problem  Relation Age of Onset    Diabetes Sister     Ovarian Cancer Sister     Other (ovarian cancer) Sister     Asthma Sister     Stroke Other     Cancer Other     Diabetes Father     Hypertension Father     Other (thyroid disease) Mother     Other (lymphoma) Mother     Dementia Mother     No Known Problems Sister     Stroke Paternal Grandmother     Asthma Son      Allergies:  Allergies[1]  Current Meds:  Medications Ordered Prior to Encounter[2]      REVIEW OF SYSTEMS   Constitutional: no fatigue normal energy no weight changes   HENT: normal sinuses and no mouth issues   Eyes: . normal vision no eye pain   Respiratory: normal respirations no cough   Cardiovascular: no CP, or palpitations   Gastrointestinal: normal bowels and no abd pains   Genitourinary:  normal urination no hematuria, no frequency   Musculoskeletal: no pains in arms/legs, normal range of motion   Skin: no rashes or skin lesions that are new   Neurological:  no weakness, no numbness, normal gait   Hematological:  no bruises or bleeding   Psychiatric/Behavioral: normal mood no anxiety normal behavior     /72 (BP Location: Right arm, Patient Position: Sitting, Cuff Size: adult)   Pulse 87   Temp 98.2 °F (36.8 °C) (Temporal)   Resp 16   Ht 5' 7\" (1.702 m)   Wt 204 lb 3.2 oz (92.6 kg)   LMP 10/02/2018 (Approximate)   SpO2 95%   BMI 31.98 kg/m²     PHYSICAL EXAM:   Constitutional: Vital signs reviewed as noted, well developed, in no acute distress.   HENT: NCAT, no bruits heard in the carotid arteries bilaterally  Eyes: pupils reactive bilaterally  Cardiovascular: nl s1 s2 no m/r/g  Pulmonary/Chest: CTA bilaterally with no wheezes  Extremities: no pedal edema   Neurological:  no weakness in UE and LE, reflexes are normal  Skin: no rashes or bruises on visualized skin, not undressed   Psychiatric:normal mood              [1]   Allergies  Allergen Reactions    Wasps SWELLING    Coffee Bean Extract [Coffea Arabica] OTHER (SEE COMMENTS)     Per  allergy testing    [place] UNKNOWN     Swelling, inflammation, SOB    Erythromycin OTHER (SEE COMMENTS)     Abdominal pain    Other OTHER (SEE COMMENTS)     Cayenne pepper- SOB    Pcn [Penicillins] REACTIVE AIRWAY DISEASE    Pork Derived Products OTHER (SEE COMMENTS)     GI upset   [2]   Current Outpatient Medications on File Prior to Visit   Medication Sig Dispense Refill    AUVI-Q 0.3 MG/0.3ML Injection Solution Auto-injector Inject 0.3 mL (1 each total) as directed as needed. 1 each 3    levocetirizine 5 MG Oral Tab Take 1 tablet (5 mg total) by mouth every evening.      estradiol (ESTRACE) 0.5 MG Oral Tab Take 1 tablet (0.5 mg total) by mouth daily. 90 tablet 1    azelastine 0.1 % Nasal Solution 2 sprays by Nasal route 2 (two) times daily as needed. 90 mL 3    metFORMIN 500 MG Oral Tab Take 1 tablet (500 mg total) by mouth 2 (two) times daily with meals. 180 tablet 3    liothyronine 5 MCG Oral Tab Take 1 tablet (5 mcg total) by mouth daily. 90 tablet 3    Cholecalciferol (VITAMIN D) 1000 UNITS Oral Tab Take 1,000 Units by mouth daily.        Acidophilus/Pectin Oral Cap Take 1 capsule by mouth daily.      montelukast 10 MG Oral Tab TAKE 1 TABLET(10 MG) BY MOUTH EVERY NIGHT (Patient not taking: Reported on 10/30/2024) 90 tablet 1    ALPRAZolam (XANAX) 0.25 MG Oral Tab Take 1 tablet (0.25 mg total) by mouth nightly as needed for Anxiety. (Patient not taking: Reported on 8/23/2024) 20 tablet 0     No current facility-administered medications on file prior to visit.

## 2024-10-30 NOTE — PATIENT INSTRUCTIONS
Lightheadedness can be due to stress so lets focus on your stress and talk to a therapist.  Also make sure that you are eating every 3-4 hours and eat a high-protein diet.  Stop the Zepbound and we will see if the Mounjaro is covered and we will decrease the dose to 7.5 mg.  Let me know in the next 6 to 8 weeks as to how you are doing

## 2024-11-01 PROBLEM — R73.03 PREDIABETES: Status: ACTIVE | Noted: 2023-06-28

## 2024-11-01 PROBLEM — E07.9 THYROID DYSFUNCTION: Status: ACTIVE | Noted: 2023-12-14

## 2024-11-01 PROBLEM — J30.1 SEASONAL ALLERGIC RHINITIS DUE TO POLLEN: Status: ACTIVE | Noted: 2023-12-14

## 2024-11-01 RX ORDER — MONTELUKAST SODIUM 10 MG/1
10 TABLET ORAL NIGHTLY
Qty: 90 TABLET | Refills: 1 | Status: SHIPPED | OUTPATIENT
Start: 2024-11-01

## 2024-11-01 NOTE — TELEPHONE ENCOUNTER
Pt was seen by Debo for an acute visit.    Montelukast 10 mg  Asthma & COPD Medication Protocol [id number]/01/2024 08:08 AM   Protocol Details Asthma Action Score greater than or equal to 20    AAP/ACT given in last 12 months   Filled 7-17-24  Qty 90  1 refill  Future Appointments   Date Time Provider Department Center   11/7/2024  1:20 PM Aby Palacios APRN [id number]   LOV 5-21-24

## 2024-11-04 ENCOUNTER — OFFICE VISIT (OUTPATIENT)
Facility: CLINIC | Age: 54
End: 2024-11-04
Payer: COMMERCIAL

## 2024-11-04 VITALS
DIASTOLIC BLOOD PRESSURE: 84 MMHG | WEIGHT: 204 LBS | HEIGHT: 67 IN | SYSTOLIC BLOOD PRESSURE: 132 MMHG | BODY MASS INDEX: 32.02 KG/M2

## 2024-11-04 DIAGNOSIS — N60.12 FIBROCYSTIC BREAST CHANGES OF BOTH BREASTS: Primary | ICD-10-CM

## 2024-11-04 DIAGNOSIS — N60.11 FIBROCYSTIC BREAST CHANGES OF BOTH BREASTS: Primary | ICD-10-CM

## 2024-11-04 NOTE — PROGRESS NOTES
Gynecology Office Visit      Ngozi Mathew is a 54 year old female  Patient's last menstrual period was 10/02/2018 (approximate). (contraception:  hyst for fibroids)     HPI:     Chief Complaint   Patient presents with    Breast Problem     Patient reports that she has noticed that she has a bulge on right mariel of breast that she is unsure if its normal. Has also noticed full under both armpits that started after getting the Covid vaccines.     Notices a change in the skin crease in and around the right breast  No palpable mass  Hx of MMG done a few months ago - asking for SIF in December. Hx of benign cyst seen on right        Chart and previous encounters reviewed.  HISTORY:  Past Medical History:    Abdominal distention    Allergic rhinitis    Anemia    Bloating    Body piercing    Brachial neuritis or radiculitis NOS    Diabetes mellitus (HCC)    Disorder of thyroid    Dizziness and giddiness    Fatigue    Fibroid uterus    Fibroids    Fibromyalgia    Frequency of urination    Heartburn    Hypothyroidism    Menorrhagia    Multiple thyroid nodules    Other abnormal glucose    Other and unspecified hyperlipidemia    Other malaise and fatigue    Peripheral vertigo, unspecified    Prediabetes    Seasonal allergies    Vertigo    (on and off for 10 yrs)    Visual impairment    contact lenses    Wears glasses      Past Surgical History:   Procedure Laterality Date    Colonoscopy      Hysterectomy  2018          Other surgical history  06/10/2021    removal of lipoma on back     Other surgical history  2022    sinus & nasal surgery    Skin surgery      Vaginal hysterectomy N/A 2018    Procedure: VAGINAL HYSTERECTOMY;  Surgeon: Zeyad Kiran MD;  Location:  MAIN OR      Family History   Problem Relation Age of Onset    Diabetes Sister     Ovarian Cancer Sister     Other (ovarian cancer) Sister     Asthma Sister     Stroke Other     Cancer Other     Diabetes Father      Hypertension Father     Other (thyroid disease) Mother     Other (lymphoma) Mother     Dementia Mother     No Known Problems Sister     Stroke Paternal Grandmother     Asthma Son       Social History:   Social History     Socioeconomic History    Marital status:    Tobacco Use    Smoking status: Former     Current packs/day: 0.00     Average packs/day: 0.3 packs/day for 15.0 years (3.8 ttl pk-yrs)     Types: Cigarettes     Start date: 1989     Quit date: 2004     Years since quittin.8     Passive exposure: Past    Smokeless tobacco: Never   Vaping Use    Vaping status: Never Used   Substance and Sexual Activity    Alcohol use: Yes     Comment: Less than one per week    Drug use: No    Sexual activity: Yes     Partners: Male     Birth control/protection: Hysterectomy   Other Topics Concern    Caffeine Concern No    Stress Concern No    Weight Concern Yes    Special Diet No    Exercise Yes    Seat Belt Yes     Social Drivers of Health      [facility]    Social Connections    Received from [facility], [facility]    Housing Stability        Medications (Active prior to today's visit):  Current Outpatient Medications   Medication Sig Dispense Refill    MONTELUKAST 10 MG Oral Tab TAKE 1 TABLET(10 MG) BY MOUTH EVERY NIGHT 90 tablet 1    Tirzepatide (MOUNJARO) 7.5 MG/0.5ML Subcutaneous Solution Auto-injector Inject 7.5 mg into the skin once a week. 9 mL 0    AUVI-Q 0.3 MG/0.3ML Injection Solution Auto-injector Inject 0.3 mL (1 each total) as directed as needed. 1 each 3    levocetirizine 5 MG Oral Tab Take 1 tablet (5 mg total) by mouth every evening.      estradiol (ESTRACE) 0.5 MG Oral Tab Take 1 tablet (0.5 mg total) by mouth daily. 90 tablet 1    ALPRAZolam (XANAX) 0.25 MG Oral Tab Take 1 tablet (0.25 mg total) by mouth nightly as needed for Anxiety. 20 tablet 0    azelastine 0.1 % Nasal Solution  2 sprays by Nasal route 2 (two) times daily as needed. 90 mL 3    metFORMIN 500 MG Oral Tab Take 1 tablet (500 mg total) by mouth 2 (two) times daily with meals. 180 tablet 3    liothyronine 5 MCG Oral Tab Take 1 tablet (5 mcg total) by mouth daily. 90 tablet 3    Cholecalciferol (VITAMIN D) 1000 UNITS Oral Tab Take 1,000 Units by mouth daily.        Acidophilus/Pectin Oral Cap Take 1 capsule by mouth daily.         Allergies:  Allergies[1]    Gyn:  Menarche: 9  Period Cycle (Days): irreg  Period Duration (Days): 5  Use of Birth Control (if yes, specify type): Hysterectomy  Date When Birth Control Last Used: Hyst   Hx Prior Abnormal Pap: No  Pap Date: 10/10/18  Pap Result Notes: NEG/NEG  Follow Up Recommendation: na - hyst for fibroids    OB Hx:  OB History    Para Term  AB Living   2 2 2     2   SAB IAB Ectopic Multiple Live Births           2      # Outcome Date GA Lbr Armaan/2nd Weight Sex Type Anes PTL Lv   2 Term  40w0d   M Vag-Spont   DAVID   1 Term  40w0d   M Vag-Spont   DAVID         ROS:     10 point ROS completed and was negative, except for pertinent positive and negatives stated in the HPI.    PHYSICAL EXAM:   /84   Ht 67\"   Wt 204 lb (92.5 kg)   LMP 10/02/2018 (Approximate)   BMI 31.95 kg/m²      Wt Readings from Last 6 Encounters:   24 204 lb (92.5 kg)   10/30/24 204 lb 3.2 oz (92.6 kg)   24 206 lb (93.4 kg)   24 208 lb 9.6 oz (94.6 kg)   05/10/24 198 lb (89.8 kg)   24 198 lb (89.8 kg)        Gen:  Oriented, in no acute distress  Breast Exam:  Skin normal. There might be a slight difference in the skin fold above the breast on the right. No scars or tattoos.  Nipples and areolar areas normal without lesions or discharge.  Breast parenchyma normal through out and bilaterally symmetrical.  No adnexal adenopathy.  Exam non-tender.      ASSESSMENT/PLAN:     1. Fibrocystic breast changes of both breasts    Continue careful SBE  Keep MMG appt for December  - move up if mass felt      Meds This Visit:  Requested Prescriptions      No prescriptions requested or ordered in this encounter       Imaging & Referrals:  None     Return in about 2 months (around 1/4/2025) for Office Visit.      Zeyad Kiran MD  11/4/2024  5:38 PM         This note was created by DTT voice recognition. Errors in content may be related to improper recognition by the system; efforts to review and correct have been done but errors may still exist. Please contact me with any questions.    Note to patient and family   The 21st Century Cures Act makes medical notes available to patients in the interest of transparency.  However, please be advised that this is a medical document.  It is intended as mckh-uu-wkif communication.  It is written and medical language may contain abbreviations or verbiage that are technical and unfamiliar.  It may appear blunt or direct.  Medical documents are intended to carry relevant information, facts as evident, and the clinical opinion of the practitioner.           [1]   Allergies  Allergen Reactions    Wasps SWELLING    Coffee Bean Extract [Coffea Arabica] OTHER (SEE COMMENTS)     Per allergy testing    [place] UNKNOWN     Swelling, inflammation, SOB    Erythromycin OTHER (SEE COMMENTS)     Abdominal pain    Other OTHER (SEE COMMENTS)     Cayenne pepper- SOB    Pcn [Penicillins] REACTIVE AIRWAY DISEASE    Pork Derived Products OTHER (SEE COMMENTS)     GI upset

## 2024-11-22 DIAGNOSIS — R92.8 ABNORMAL MAMMOGRAM: Primary | ICD-10-CM

## 2024-11-22 NOTE — PROGRESS NOTES
[facility] Order placed and faxed to [facility].

## 2024-11-25 ENCOUNTER — MED REC SCAN ONLY (OUTPATIENT)
Facility: CLINIC | Age: 54
End: 2024-11-25

## 2024-11-25 DIAGNOSIS — E11.8 TYPE 2 DIABETES MELLITUS WITH COMPLICATION, WITHOUT LONG-TERM CURRENT USE OF INSULIN (HCC): ICD-10-CM

## 2024-11-25 NOTE — TELEPHONE ENCOUNTER
Tirzepatide (MOUNJARO) 7.5 MG/0.5ML Subcutaneous Solution Auto-injector     [id number] [place], [place] - [address] AT [facility] [address] [place] [place], [phone], [phone]

## 2024-11-26 ENCOUNTER — HOSPITAL ENCOUNTER (OUTPATIENT)
Age: 54
Discharge: HOME | End: 2024-11-26
Payer: COMMERCIAL

## 2024-11-26 VITALS
RESPIRATION RATE: 18 BRPM | HEART RATE: 88 BPM | DIASTOLIC BLOOD PRESSURE: 83 MMHG | TEMPERATURE: 97 F | SYSTOLIC BLOOD PRESSURE: 137 MMHG | OXYGEN SATURATION: 94 %

## 2024-11-26 DIAGNOSIS — L72.0 EPIDERMOID CYST: Primary | ICD-10-CM

## 2024-11-26 PROCEDURE — 99213 OFFICE O/P EST LOW 20 MIN: CPT | Performed by: NURSE PRACTITIONER

## 2024-11-26 NOTE — DISCHARGE INSTRUCTIONS
Rest and drink plenty of fluids.   Use Hydrocortisone cream over the counter 2-3 times per day to help with itching.   Follow up with your dermatologist as needed.   You can also try [facility] or [facility] for follow up .

## 2024-11-26 NOTE — ED PROVIDER NOTES
Patient Seen in: [facility]      History     Chief Complaint   Patient presents with    Lump     Stated Complaint: BUMP ON LEFT ARM    Subjective:   53 yo female presents to the immediate care with c/o a bump.  Patient states she noticed a bump to her left upper arm about 1 week ago.  Since noticing it she states it has been red and slightly itchy.  She denies any fever, chills, pain, exudates, or surrounding redness. She has not tried anything on it to help with her symptoms.     The history is provided by the patient.         Objective:     Past Medical History:    Abdominal distention    Allergic rhinitis    Anemia    Bloating    Body piercing    Brachial neuritis or radiculitis NOS    Diabetes mellitus (HCC)    Disorder of thyroid    Dizziness and giddiness    Fatigue    Fibroid uterus    Fibroids    Fibromyalgia    Frequency of urination    Heartburn    Hypothyroidism    Menorrhagia    Multiple thyroid nodules    Other abnormal glucose    Other and unspecified hyperlipidemia    Other malaise and fatigue    Peripheral vertigo, unspecified    Prediabetes    Seasonal allergies    Vertigo    (on and off for 10 yrs)    Visual impairment    contact lenses    Wears glasses              Past Surgical History:   Procedure Laterality Date    Colonoscopy      Hysterectomy  2018          Other surgical history  06/10/2021    removal of lipoma on back     Other surgical history  2022    sinus & nasal surgery    Skin surgery      Vaginal hysterectomy N/A 2018    Procedure: VAGINAL HYSTERECTOMY;  Surgeon: Zeyad Kiran MD;  Location:  MAIN OR                Social History     Socioeconomic History    Marital status:    Tobacco Use    Smoking status: Former     Current packs/day: 0.00     Average packs/day: 0.3 packs/day for 15.0 years (3.8 ttl pk-yrs)     Types: Cigarettes     Start date: 1989     Quit date: 2004     Years since quittin.9     Passive  exposure: Past    Smokeless tobacco: Never   Vaping Use    Vaping status: Never Used   Substance and Sexual Activity    Alcohol use: Yes     Comment: Less than one per week    Drug use: No    Sexual activity: Yes     Partners: Male     Birth control/protection: Hysterectomy   Other Topics Concern    Caffeine Concern No    Stress Concern No    Weight Concern Yes    Special Diet No    Exercise Yes    Seat Belt Yes     Social Drivers of Health      Received from [facility], [facility]    Social Connections    Received from [facility], [facility]    Housing Stability              Review of Systems   Constitutional: Negative.    Skin:  Positive for color change and wound.   All other systems reviewed and are negative.      Positive for stated complaint: BUMP ON LEFT ARM  Other systems are as noted in HPI.  Constitutional and vital signs reviewed.      All other systems reviewed and negative except as noted above.    Physical Exam     ED Triage Vitals [11/26/24 1550]   /83   Pulse 88   Resp 18   Temp 96.7 °F (35.9 °C)   Temp src Temporal   SpO2 94 %   O2 Device None (Room air)       Current Vitals:   Vital Signs  BP: 137/83  Pulse: 88  Resp: 18  Temp: 96.7 °F (35.9 °C)  Temp src: Temporal    Oxygen Therapy  SpO2: 94 %  O2 Device: None (Room air)        Physical Exam  Vitals and nursing note reviewed.   Constitutional:       General: She is not in acute distress.     Appearance: Normal appearance. She is obese. She is not ill-appearing.   HENT:      Head: Normocephalic and atraumatic.      Mouth/Throat:      Mouth: Mucous membranes are moist.      Pharynx: Oropharynx is clear.   Eyes:      Conjunctiva/sclera: Conjunctivae normal.   Cardiovascular:      Rate and Rhythm: Normal rate and regular rhythm.      Pulses: Normal pulses.      Heart sounds: Normal heart sounds.   Pulmonary:      Effort: Pulmonary effort is normal.      Breath  sounds: Normal breath sounds.   Musculoskeletal:         General: Normal range of motion.   Skin:     General: Skin is warm and dry.      Comments: Epidermoid cyst approximately quarter sized to left lateral upper arm.  It is mildly erythematous.  No indication of infection or surrounding cellulitis.    Neurological:      General: No focal deficit present.      Mental Status: She is alert and oriented to person, place, and time.   Psychiatric:         Mood and Affect: Mood normal.         Behavior: Behavior normal.           ED Course   Labs Reviewed - No data to display       MDM          Medical Decision Making  55 yo female with epidermoid cyst of the left upper arm.  Patient observed rubbing area frequently.  Discussed with patient that I do not feel that this is infected.  The cyst would be best removed by a dermatologist.  Patient states she has one that she has seen in the past.  Recommended use of Benadryl or Hydrocortisone cream to help with itching.  No evidence of sepsis, cellulitis or abscess.  Patient also provided with referrals for dermatologists in the area.      Risk  OTC drugs.        Disposition and Plan     Clinical Impression:  1. Epidermoid cyst         Disposition:  Discharge  11/26/2024  4:11 pm    Follow-up:  [facility] [address]  [facility] [address] [phone]    As needed    [facility]  [address] [place] [id number]    As needed          Medications Prescribed:  Discharge Medication List as of 11/26/2024  4:14 PM              Supplementary Documentation:

## 2024-11-26 NOTE — TELEPHONE ENCOUNTER
Protocol passed     LOV: 10/30/24   RTC: 6-8 weeks  Labs: 8/23/24   Filled: 10/30/21 #9mL   Future Appointments   Date Time Provider Department Center   12/18/2024 11:00 AM OS US [facility] [place]   1/9/2025 11:00 AM Aby Palacios APRN [id number]

## 2024-11-27 RX ORDER — TIRZEPATIDE 7.5 MG/.5ML
7.5 INJECTION, SOLUTION SUBCUTANEOUS WEEKLY
Qty: 9 ML | Refills: 1 | Status: SHIPPED | OUTPATIENT
Start: 2024-11-27

## 2024-12-10 ENCOUNTER — MED REC SCAN ONLY (OUTPATIENT)
Facility: CLINIC | Age: 54
End: 2024-12-10

## 2024-12-18 ENCOUNTER — HOSPITAL ENCOUNTER (OUTPATIENT)
Dept: ULTRASOUND IMAGING | Age: 54
Discharge: HOME | End: 2024-12-18
Attending: PHYSICIAN ASSISTANT
Payer: COMMERCIAL

## 2024-12-18 DIAGNOSIS — E04.2 MULTINODULAR THYROID: ICD-10-CM

## 2024-12-18 DIAGNOSIS — H69.93 DYSFUNCTION OF BOTH EUSTACHIAN TUBES: ICD-10-CM

## 2024-12-18 DIAGNOSIS — J30.1 SEASONAL ALLERGIC RHINITIS DUE TO POLLEN: ICD-10-CM

## 2024-12-18 PROCEDURE — 76536 US EXAM OF HEAD AND NECK: CPT | Performed by: PHYSICIAN ASSISTANT

## 2024-12-23 ENCOUNTER — ORDER TRANSCRIPTION (OUTPATIENT)
Dept: ADMINISTRATIVE | Facility: HOSPITAL | Age: 54
End: 2024-12-23

## 2024-12-23 DIAGNOSIS — E04.1 THYROID NODULE: Primary | ICD-10-CM

## 2024-12-27 ENCOUNTER — LAB ENCOUNTER (OUTPATIENT)
Dept: LAB | Age: 54
End: 2024-12-27
Attending: OTOLARYNGOLOGY
Payer: COMMERCIAL

## 2024-12-27 DIAGNOSIS — E04.1 THYROID NODULE: Primary | ICD-10-CM

## 2024-12-27 LAB
T4 FREE SERPL-MCNC: 1.2 NG/DL (ref 0.8–1.7)
TSI SER-ACNC: 1.51 UIU/ML (ref 0.55–4.78)

## 2024-12-27 PROCEDURE — 36415 COLL VENOUS BLD VENIPUNCTURE: CPT

## 2024-12-27 PROCEDURE — 84439 ASSAY OF FREE THYROXINE: CPT

## 2024-12-27 PROCEDURE — 84443 ASSAY THYROID STIM HORMONE: CPT

## 2025-01-14 NOTE — DISCHARGE INSTRUCTIONS
Discharge/After Visit Instructions  [facility] - Department of Radiology  Biopsy of the Thyroid - Biopsy of Lymph Node - Biopsy of Soft Tissue    Activity  Take it easy for the rest of the day after your biopsy. You may be sore at the site of the biopsy for the next 5 days. Do not do any strenuous exercises or lift over 5 lbs. for 24 hours after the procedure.     Biopsy Site  Keep the bandage on the biopsy site for the next hour. No shower/bathing restrictions.    Pain Management  You may use over-the-counter or prescribed pain relief medication if not contraindicated due to a medical condition.   You may use a covered ice pack to the procedure site for 20 min, as needed, for pain.   The site may be red or tender for a few days.  You may develop a sore throat after the procedure. If necessary, throat lozenges may be used to relieve the discomfort.     Medications  You may resume your usual home medications, including blood thinners (24 hours after the procedure) if you experience no bleeding or hematoma at the puncture site.     When to seek medical advice  Call the provider that ordered the biopsy with questions and to discuss test results. They may also be available on your [facility] My Chart. You may also call the Radiology nurse at [phone], M-F, 8-5 with any additional questions or concerns.    Dial [phone] and ask the  to page the on-call Radiologist right away if any of these occur:  There is a change in color or temperature of the area where the biopsy was performed.  You develop increasing pain, increased swelling in your neck, difficulty breathing or swallowing.    IF YOU FEEL YOU ARE HAVING AN EMERGENCY,   CALL 911 OR GO TO THE NEAREST EMERGENCY ROOM      4.2.24 MO/  Radiology

## 2025-01-16 ENCOUNTER — HOSPITAL ENCOUNTER (OUTPATIENT)
Dept: ULTRASOUND IMAGING | Facility: HOSPITAL | Age: 55
Discharge: HOME | End: 2025-01-16
Attending: OTOLARYNGOLOGY
Payer: COMMERCIAL

## 2025-01-16 DIAGNOSIS — E04.1 THYROID NODULE: ICD-10-CM

## 2025-01-16 PROCEDURE — 10005 FNA BX W/US GDN 1ST LES: CPT | Performed by: OTOLARYNGOLOGY

## 2025-01-16 PROCEDURE — 88173 CYTOPATH EVAL FNA REPORT: CPT | Performed by: OTOLARYNGOLOGY

## 2025-01-16 NOTE — PROCEDURES
[facility]   part of [facility]  Procedure Note    Ngozi Mathew Patient Status:  Outpatient    1970 MRN [id number]   Location [facility] ULTRASOUND Attending Zay Robles MD    Day # 0 PCP Ann Vidal MD     Procedure: US guided FNA of right thyroid nodule    Pre-Procedure Diagnosis:  Thyroid nodule    Post-Procedure Diagnosis: same    Anesthesia:  Local    Findings:  US guided FNA of right thyroid nodule    Specimens: 5 specimens    Blood Loss:  < 5 cc    Tourniquet Time: none  Complications:  None  Drains:  none    Secondary Diagnosis:  none    RAFAEL PARKER MD  2025

## 2025-03-17 RX ORDER — ESTRADIOL 0.5 MG/1
0.5 TABLET ORAL DAILY
Qty: 90 TABLET | Refills: 1 | Status: SHIPPED | OUTPATIENT
Start: 2025-03-17

## 2025-04-02 ENCOUNTER — HOSPITAL ENCOUNTER (OUTPATIENT)
Age: 55
Discharge: HOME | End: 2025-04-02
Payer: COMMERCIAL

## 2025-04-02 VITALS
HEART RATE: 85 BPM | TEMPERATURE: 98 F | DIASTOLIC BLOOD PRESSURE: 92 MMHG | RESPIRATION RATE: 18 BRPM | WEIGHT: 194 LBS | OXYGEN SATURATION: 98 % | HEIGHT: 67 IN | SYSTOLIC BLOOD PRESSURE: 147 MMHG | BODY MASS INDEX: 30.45 KG/M2

## 2025-04-02 DIAGNOSIS — J06.9 VIRAL URI WITH COUGH: Primary | ICD-10-CM

## 2025-04-02 LAB
POCT INFLUENZA A: NEGATIVE
POCT INFLUENZA B: NEGATIVE
S PYO AG THROAT QL: NEGATIVE
SARS-COV-2 RNA RESP QL NAA+PROBE: NOT DETECTED

## 2025-04-02 PROCEDURE — 87880 STREP A ASSAY W/OPTIC: CPT | Performed by: NURSE PRACTITIONER

## 2025-04-02 PROCEDURE — 87502 INFLUENZA DNA AMP PROBE: CPT | Performed by: NURSE PRACTITIONER

## 2025-04-02 PROCEDURE — 99214 OFFICE O/P EST MOD 30 MIN: CPT | Performed by: NURSE PRACTITIONER

## 2025-04-02 PROCEDURE — U0002 COVID-19 LAB TEST NON-CDC: HCPCS | Performed by: NURSE PRACTITIONER

## 2025-04-02 RX ORDER — METHYLPREDNISOLONE 4 MG/1
TABLET ORAL
Qty: 1 EACH | Refills: 0 | Status: SHIPPED | OUTPATIENT
Start: 2025-04-02

## 2025-04-02 NOTE — ED PROVIDER NOTES
Patient Seen in: [facility]      History     Chief Complaint   Patient presents with    Cough/URI    Sore Throat     Stated Complaint: scratchy throat, hard time to take deep break, cough    Subjective:   54-year-old female presents today with URI symptoms sore throat and a cough.  Symptoms over the last 4 to 5 days.  Denies any difficulty swallowing controlling secretion no stridor shortness of breath.  Recent travel to [place] and back.  Denies any other symptoms or concerns.  The patient's medication list, past medical history and social history elements as listed in today's nurse's notes were reviewed and agreed (except as otherwise stated in the HPI).  The patient's family history reviewed and determined to be noncontributory to the presenting problem              Objective:     Past Medical History:    Abdominal distention    Allergic rhinitis    Anemia    Bloating    Body piercing    Brachial neuritis or radiculitis NOS    Diabetes mellitus (HCC)    Disorder of thyroid    Dizziness and giddiness    Fatigue    Fibroid uterus    Fibroids    Fibromyalgia    Frequency of urination    Heartburn    Hypothyroidism    Menorrhagia    Multiple thyroid nodules    Other abnormal glucose    Other and unspecified hyperlipidemia    Other malaise and fatigue    Peripheral vertigo, unspecified    Prediabetes    Seasonal allergies    Vertigo    (on and off for 10 yrs)    Visual impairment    contact lenses    Wears glasses              Past Surgical History:   Procedure Laterality Date    Colonoscopy      Hysterectomy  2018          Other surgical history  06/10/2021    removal of lipoma on back     Other surgical history  2022    sinus & nasal surgery    Skin surgery      Vaginal hysterectomy N/A 2018    Procedure: VAGINAL HYSTERECTOMY;  Surgeon: Zeyad Kiran MD;  Location:  MAIN OR                Social History     Socioeconomic History    Marital status:     Tobacco Use    Smoking status: Former     Current packs/day: 0.00     Average packs/day: 0.3 packs/day for 15.0 years (3.8 ttl pk-yrs)     Types: Cigarettes     Start date: 1989     Quit date: 2004     Years since quittin.2     Passive exposure: Past    Smokeless tobacco: Never   Vaping Use    Vaping status: Never Used   Substance and Sexual Activity    Alcohol use: Yes     Comment: Less than one per week    Drug use: No    Sexual activity: Yes     Partners: Male     Birth control/protection: Hysterectomy   Other Topics Concern    Caffeine Concern No    Stress Concern No    Weight Concern Yes    Special Diet No    Exercise Yes    Seat Belt Yes     Social Drivers of Health      Received from [facility], [facility]    Housing Stability              Review of Systems    Positive for stated complaint: scratchy throat, hard time to take deep break, cough  Other systems are as noted in HPI.  Constitutional and vital signs reviewed.      All other systems reviewed and negative except as noted above.    Physical Exam     ED Triage Vitals [25 1801]   BP (!) 147/92   Pulse 85   Resp 18   Temp 98.1 °F (36.7 °C)   Temp src Oral   SpO2 98 %   O2 Device None (Room air)       Current Vitals:   Vital Signs  BP: (!) 147/92 (manual)  Pulse: 85  Resp: 18  Temp: 98.1 °F (36.7 °C)  Temp src: Oral    Oxygen Therapy  SpO2: 98 %  O2 Device: None (Room air)        Physical Exam  Vitals and nursing note reviewed.   Constitutional:       Appearance: She is well-developed.   HENT:      Head: Normocephalic.      Right Ear: Ear canal normal. A middle ear effusion is present.      Left Ear: Ear canal normal. A middle ear effusion is present.      Nose: Mucosal edema, congestion and rhinorrhea present.      Mouth/Throat:      Pharynx: Uvula midline. Posterior oropharyngeal erythema present.   Eyes:      Conjunctiva/sclera: Conjunctivae normal.      Pupils: Pupils are equal, round, and  reactive to light.   Cardiovascular:      Rate and Rhythm: Normal rate and regular rhythm.   Pulmonary:      Effort: Pulmonary effort is normal.      Breath sounds: Normal breath sounds.   Musculoskeletal:      Cervical back: Normal range of motion and neck supple.   Skin:     General: Skin is warm and dry.   Neurological:      Mental Status: She is alert and oriented to person, place, and time.             ED Course     Labs Reviewed   POCT RAPID STREP - Normal   RAPID SARS-COV-2 BY PCR - Normal   POCT FLU TEST - Normal    Narrative:     This assay is a rapid molecular in vitro test utilizing nucleic acid amplification of influenza A and B viral RNA.                   MDM     Please note that this report has been produced using speech recognition software and may contain errors related to that system including, but not limited to, errors in grammar, punctuation, and spelling, as well as words and phrases that possibly may have been recognized inappropriately.  If there are any questions or concerns, contact the dictating provider for clarification.              Medical Decision Making  Differential diagnosis includes but is not limited to: COVID-19, viral URI, strep throat, influenza, pneumonia, sinusitis, bronchitis      Presents today with URI symptoms sore throat and cough.  Rapid strep was done and negative.  Flu and COVID testing also done and negative.  Do suspect viral cause of illness.  On exam did see fluid behind each TM will give prescription for Medrol Dosepak to help with inflammation.  Supportive care discussed.  To follow-up with primary care physician in 1 week if symptoms do not improve.  Patient verbalized understanding and agreed to plan of care.    Amount and/or Complexity of Data Reviewed  Labs: ordered. Decision-making details documented in ED Course.     Details: COVID-19  Influenza  Strep    Risk  OTC drugs.  Prescription drug management.        Disposition and Plan     Clinical  Impression:  1. Viral URI with cough         Disposition:  Discharge  4/2/2025  6:32 pm    Follow-up:  Ann Vidal MD  [address] [place] [id number] [phone]    In 1 week  As needed          Medications Prescribed:  Current Discharge Medication List        START taking these medications    Details   methylPREDNISolone (MEDROL) 4 MG Oral Tablet Therapy Pack Dosepack: take as directed  Qty: 1 each, Refills: 0                 Supplementary Documentation:

## 2025-04-02 NOTE — ED INITIAL ASSESSMENT (HPI)
Pt with co scratchy throat starting on sun and cough on Monday. Feels itchy chest and co bilat ear l>r

## 2025-04-11 ENCOUNTER — TELEPHONE (OUTPATIENT)
Dept: INTERNAL MEDICINE CLINIC | Facility: CLINIC | Age: 55
End: 2025-04-11

## 2025-04-11 DIAGNOSIS — Z00.00 LABORATORY EXAM ORDERED AS PART OF ROUTINE GENERAL MEDICAL EXAMINATION: Primary | ICD-10-CM

## 2025-04-11 DIAGNOSIS — E11.8 TYPE 2 DIABETES MELLITUS WITH COMPLICATION, WITHOUT LONG-TERM CURRENT USE OF INSULIN (HCC): ICD-10-CM

## 2025-04-12 RX ORDER — TIRZEPATIDE 10 MG/.5ML
INJECTION, SOLUTION SUBCUTANEOUS
Qty: 6 ML | Refills: 0 | Status: SHIPPED | OUTPATIENT
Start: 2025-04-12

## 2025-04-12 NOTE — TELEPHONE ENCOUNTER
Protocol FAIL    Requesting: Tirzepatide (MOUNJARO) 10 MG/0.5ML Subcutaneous Solution Auto-injector     LOV: 10/30/24   RTC: 6-8 WEEKS  Filled: 12/20/24 2ML 0 REFILL  Recent Labs: 8/23/24    Upcoming OV   Future Appointments   Date Time Provider Department Center   4/24/2025  2:10 PM Zeyad Kiran MD [facility]

## 2025-04-12 NOTE — TELEPHONE ENCOUNTER
Please call patient to schedule annual physical. Let patient know I placed orders for blood work and urine to complete prior to appointment

## 2025-04-17 NOTE — TELEPHONE ENCOUNTER
CBC is ordered - the CBC includes a white blood cell count    Chris Minorenia to Me (Selected Message)  YV      4/16/25  3:00 PM  Pt also requesting a lab for white blood count please.

## 2025-04-24 ENCOUNTER — OFFICE VISIT (OUTPATIENT)
Facility: CLINIC | Age: 55
End: 2025-04-24
Payer: COMMERCIAL

## 2025-04-24 VITALS — DIASTOLIC BLOOD PRESSURE: 86 MMHG | SYSTOLIC BLOOD PRESSURE: 130 MMHG | BODY MASS INDEX: 31 KG/M2 | WEIGHT: 200 LBS

## 2025-04-24 DIAGNOSIS — N60.11 FIBROCYSTIC BREAST CHANGES OF BOTH BREASTS: ICD-10-CM

## 2025-04-24 DIAGNOSIS — Z90.710 HISTORY OF ROBOT-ASSISTED LAPAROSCOPIC HYSTERECTOMY: ICD-10-CM

## 2025-04-24 DIAGNOSIS — Z01.419 WELL WOMAN EXAM WITH ROUTINE GYNECOLOGICAL EXAM: Primary | ICD-10-CM

## 2025-04-24 DIAGNOSIS — Z79.890 HORMONE REPLACEMENT THERAPY (HRT): ICD-10-CM

## 2025-04-24 DIAGNOSIS — Z12.31 SCREENING MAMMOGRAM FOR BREAST CANCER: ICD-10-CM

## 2025-04-24 DIAGNOSIS — Z12.6 SCREENING FOR BLADDER CANCER: ICD-10-CM

## 2025-04-24 DIAGNOSIS — N60.12 FIBROCYSTIC BREAST CHANGES OF BOTH BREASTS: ICD-10-CM

## 2025-04-24 LAB
APPEARANCE: CLEAR
BILIRUBIN: NEGATIVE
GLUCOSE (URINE DIPSTICK): NEGATIVE MG/DL
KETONES (URINE DIPSTICK): NEGATIVE MG/DL
LEUKOCYTES: NEGATIVE
NITRITE, URINE: NEGATIVE
PH, URINE: 5.5 (ref 4.5–8)
PROTEIN (URINE DIPSTICK): 30 MG/DL
SPECIFIC GRAVITY: 1.02 (ref 1–1.03)
URINE-COLOR: YELLOW
UROBILINOGEN,SEMI-QN: 0.2 MG/DL (ref 0–1.9)

## 2025-04-24 PROCEDURE — 3079F DIAST BP 80-89 MM HG: CPT | Performed by: OBSTETRICS & GYNECOLOGY

## 2025-04-24 PROCEDURE — 3075F SYST BP GE 130 - 139MM HG: CPT | Performed by: OBSTETRICS & GYNECOLOGY

## 2025-04-24 PROCEDURE — 81003 URINALYSIS AUTO W/O SCOPE: CPT | Performed by: OBSTETRICS & GYNECOLOGY

## 2025-04-24 PROCEDURE — 99396 PREV VISIT EST AGE 40-64: CPT | Performed by: OBSTETRICS & GYNECOLOGY

## 2025-04-24 RX ORDER — ESTRADIOL 0.5 MG/1
0.5 TABLET ORAL DAILY
Qty: 90 TABLET | Refills: 3 | Status: SHIPPED | OUTPATIENT
Start: 2025-04-24

## 2025-04-24 RX ORDER — IBUPROFEN 800 MG/1
TABLET, FILM COATED ORAL
COMMUNITY
Start: 2024-11-27

## 2025-04-24 RX ORDER — FLUOCINOLONE ACETONIDE 0.11 MG/ML
2 OIL AURICULAR (OTIC)
COMMUNITY
Start: 2024-12-26

## 2025-04-24 NOTE — PROGRESS NOTES
GYN H&P     Genetic questionnaire reviewed with the patient and she will be referred for genetic counseling if the questionnaire had any positive results.    The [facility] intake form was also reviewed regarding contraception, menstrual periods, urinary health, and vaginal / sexual health    2025  2:18 PM    Chief Complaint   Patient presents with    Gynecologic Exam     Annual       HPI: Ngozi is a 54 year old  Patient's last menstrual period was 10/02/2018 (approximate).  (contraception: hysterectomy 2018 for fibroids) here for her annual gyn exam.     She has cold intolerance.  Pt is post menopausal. Denies any pelvic or breast complaints.      Pt states she has been feeling cold for the past 4-5 y. States that when in warm rooms pt still feels cold. Pt notes that sometimes she notices her hair is thinner but doesn't know if it is due to normal aging. Pt does her own self breast exam. Mammogram scheduled for . Pt denies pain, skin changes, nipple changes. Has noted a new bruise on her left breast, lower outer quadrant. Otherwise no complaints.      Previous encounters and chart reviewed.     OB History    Para Term  AB Living   2 2 2   2   SAB IAB Ectopic Multiple Live Births       2      # Outcome Date GA Lbr Armaan/2nd Weight Sex Type Anes PTL Lv   2 Term  40w0d   M Vag-Spont   DAVID   1 Term  40w0d   M Vag-Spont   DAVID       GYN hx:   Menarche: 9  Period Cycle (Days): n/a  Period Duration (Days): 5  Use of Birth Control (if yes, specify type): Hysterectomy  Date When Birth Control Last Used: Hyst   Hx Prior Abnormal Pap: No  Pap Date: 10/10/18  Pap Result Notes: NEG/NEG  Follow Up Recommendation: na - hyst for fibroids      Past Medical History[1]  Past Surgical History[2]  Allergies[3]  Medications Ordered Prior to Encounter[4]  Family History[5]  Social History     Socioeconomic History    Marital status:      Spouse name: Not on file     Number of children: Not on file    Years of education: Not on file    Highest education level: Not on file   Occupational History    Not on file   Tobacco Use    Smoking status: Former     Current packs/day: 0.00     Average packs/day: 0.3 packs/day for 15.0 years (3.8 ttl pk-yrs)     Types: Cigarettes     Start date: 1989     Quit date: 2004     Years since quittin.3     Passive exposure: Past    Smokeless tobacco: Never   Vaping Use    Vaping status: Never Used   Substance and Sexual Activity    Alcohol use: Yes     Comment: Less than one per week    Drug use: No    Sexual activity: Yes     Partners: Male     Birth control/protection: Hysterectomy   Other Topics Concern     Service Not Asked    Blood Transfusions Not Asked    Caffeine Concern No    Occupational Exposure Not Asked    Hobby Hazards Not Asked    Sleep Concern Not Asked    Stress Concern No    Weight Concern Yes    Special Diet No    Back Care Not Asked    Exercise Yes    Bike Helmet Not Asked    Seat Belt Yes    Self-Exams Not Asked   Social History Narrative    Not on file     Social Drivers of Health     Food Insecurity: No Food Insecurity (2025)    NCSS - Food Insecurity     Worried About Running Out of Food in the Last Year: No     Ran Out of Food in the Last Year: No   Transportation Needs: No Transportation Needs (2025)    NCSS - Transportation     Lack of Transportation: No   Stress: Not on file   Housing Stability: Not At Risk (2025)    NCSS - Housing/Utilities     Has Housing: Yes     Worried About Losing Housing: No     Unable to Get Utilities: No       ROS:     Review of Systems:  General: denies fevers, chills, fatigue and malaise.   Eyes: no visual changes, denies headaches  ENT: no complaints, denies earaches, runny nose, epistaxis, throat pain or sore throat  Respiratory: denies SOB, dyspnea, cough or wheezing  Cardiovascular: denies chest pain, palpitations, exercise intolerance   GI: denies  abdominal pain, diarrhea, constipation  : no complaints, denies dysuria, increased urinary frequency, no dyspareunia   Hematological/lymphatic: denies history of excessive bleeding or bruising, denies dizziness, lightheadedness.   Breast: denies rashes, skin changes, pain, lumps or discharge   Psychiatric: denies depression, changes in sleep patterns, anxiety  Endocrine: denies hot or cold intolerance, mood changes   Neurological: denies changes in sight, smell, hearing or taste. Denies seizures or tremors  Immunological: denies allergies, denies anaphylaxis, or swollen lymph nodes  Musculoskeletal: denies joint pain, morning stiffness, decreased range of motion         O /86   Wt 200 lb (90.7 kg)   LMP 10/02/2018 (Approximate)   BMI 31.32 kg/m²         Wt Readings from Last 6 Encounters:   04/24/25 200 lb (90.7 kg)   04/02/25 194 lb (88 kg)   11/04/24 204 lb (92.5 kg)   10/30/24 204 lb 3.2 oz (92.6 kg)   08/23/24 206 lb (93.4 kg)   05/21/24 208 lb 9.6 oz (94.6 kg)     Exam:   GENERAL: well developed, well nourished, in no apparent distress, oriented.  SKIN: no rashes, no suspicious lesions  HEENT: normal  NECK: supple; no thyromegaly, no adenopathy  LUNGS: clear to auscultation  CARDIOVASCULAR: normal S1, S2, RRR  BREASTS: soft, nontender, no palpable masses or nodes, no nipple discharge, no skin changes, no axillary adenopathy.  ABDOMEN: scope scars,  soft, non distended; non tender, no masses  PELVIC: External Genitalia: Normal appearing, no lesions.    Vagina: normal pink mucosa, no lesions, normal clear discharge.  Cuff well healed and supported.     Rectal: deferred  EXTREMITIES:  non tender without edema    Collected 12/27/2024 10:15 AM       Status: Final result       Dx: Thyroid nodule    0 Result Notes      Component  Ref Range & Units (hover) 12/27/24 10:15 AM   Free T4 1.2        Lab Results   Component Value Date    SPECGRAVITY 1.025 04/24/2025    PHURINE 5.5 04/24/2025    NITRITE Negative  04/24/2025     Component  Ref Range & Units 12/27/24 12:00 AM   TSH  0.550 - 4.780 uIU/mL 1.509   FREE T4  0.8 - 1.7 1.2       Patient counseled on:    Diet/exercise.      Self Breast Exams     Safe sex practices / and living environment    Vaccines:  Annual Flu, Tdap +/- Gardasil n/a recommended (up to 45 yrs).      Pneumococcal at 65 yrs old, Shingles at 60 yrs old          Pap: na  GC/Chlamydia:  n/a  Mammogram:  Last 11/23; Due today; Ordered   Dexa: n/a  Colonoscopy / Cologuard:  Done 5/22; Due 5/32  Lipid / Cholesterol: Last 12/23, Ordered 4/25    Component  Ref Range & Units (hover) 12/7/23  1:38 PM   Cholesterol, Total 240 High    Comment: Desirable  <200 mg/dL  Borderline  200-239 mg/dL  High      >=240 mg/dL     HDL Cholesterol 50   Comment: Interpretive Information:  An HDL cholesterol <40 mg/dL is low and constitutes a coronary heart disease risk factor. An HDL cholesterol >60 mg/dL is a negative risk factor for coronary heart disease.     Triglycerides 127   Comment: Reference interval for fasting triglycerides  Desirable: <150 mg/dL  Borderline: 150-199 mg/dL  High: 200-499 mg/dL  Very High: >=500 mg/dL       LDL Cholesterol 167 High    Comment: Desirable <100 mg/dL  Borderline 100-129 mg/dL  High     >=130mg/dL     VLDL 25   Non HDL Chol 190 High    Comment: Desirable  <130 mg/dL  Borderline  130-159 mg/dL  High        160-189 mg/dL      Very high >=190 mg/dL     Patient Fasting for Lipid? No        A/P: Patient is 54 year old female with no complaints. Here for well woman exam.     Meds This Visit:    Requested Prescriptions     Signed Prescriptions Disp Refills    estradiol 0.5 MG Oral Tab 90 tablet 3     Sig: Take 1 tablet (0.5 mg total) by mouth daily.       1. Well woman exam with routine gynecological exam    2. Screening for bladder cancer  - URINALYSIS, AUTO, W/O SCOPE    3. Screening mammogram for breast cancer  - [facility] EFE 2D+3D SCREENING BILAT (CPT=77067/79642); Future    4. Hormone  replacement therapy (HRT)  - estradiol 0.5 MG Oral Tab; Take 1 tablet (0.5 mg total) by mouth daily.  Dispense: 90 tablet; Refill: 3    5. History of robot-assisted laparoscopic hysterectomy    6. Fibrocystic breast changes of both breasts    Vit. D and exercise  R v B of ERT and NAMS discussed.  Not sure what cold intolerance is related to - Thyroid    Return in about 1 year (around 4/24/2026) for Well Woman Exam.    Zeyad Kiran MD   4/24/2025  2:18 PM       This note was created by Dragon voice recognition. Errors in content may be related to improper recognition by the system; efforts to review and correct have been done but errors may still exist. Please contact me with any questions.    Note to patient and family   The 21st Century Cures Act makes medical notes available to patients in the interest of transparency.  However, please be advised that this is a medical document.  It is intended as [id number] communication.  It is written in medical language which may contain abbreviations or verbiage that are technical and unfamiliar.  It may appear blunt or direct.  Medical documents are intended to carry relevant information, facts as evident, and the clinical opinion of the practitioner.           [1]   Past Medical History:   Abdominal distention    Allergic rhinitis    Anemia    Bloating    Body piercing    Brachial neuritis or radiculitis NOS    Diabetes mellitus (HCC)    Disorder of thyroid    Dizziness and giddiness    Fatigue    Fibroid uterus    Fibroids    Fibromyalgia    Frequency of urination    Heartburn    Hypothyroidism    Menorrhagia    Multiple thyroid nodules    Other abnormal glucose    Other and unspecified hyperlipidemia    Other malaise and fatigue    Peripheral vertigo, unspecified    Prediabetes    Seasonal allergies    Vertigo    (on and off for 10 yrs)    Visual impairment    contact lenses    Wears glasses   [2]   Past Surgical History:  Procedure Laterality Date     Colonoscopy      Hysterectomy  2018          Other surgical history  06/10/2021    removal of lipoma on back     Other surgical history  2022    sinus & nasal surgery    Skin surgery      Vaginal hysterectomy N/A 2018    Procedure: VAGINAL HYSTERECTOMY;  Surgeon: Zeyad Kiran MD;  Location:  MAIN OR   [3]   Allergies  Allergen Reactions    Wasps SWELLING    Coffee Bean Extract [Coffea Arabica] OTHER (SEE COMMENTS)     Per allergy testing    [place] UNKNOWN     Swelling, inflammation, SOB    Erythromycin OTHER (SEE COMMENTS)     Abdominal pain    Other OTHER (SEE COMMENTS)     Cayenne pepper- SOB    Pcn [Penicillins] REACTIVE AIRWAY DISEASE    Pork Derived Products OTHER (SEE COMMENTS)     GI upset   [4]   Current Outpatient Medications on File Prior to Visit   Medication Sig Dispense Refill    Fluocinolone Acetonide 0.01 % Otic Oil 2 drops by Each ear route.      ibuprofen 800 MG Oral Tab       Tirzepatide (MOUNJARO) 10 MG/0.5ML Subcutaneous Solution Auto-injector ADMINISTER 10 MG UNDER THE SKIN 1 TIME A WEEK 6 mL 0    AUVI-Q 0.3 MG/0.3ML Injection Solution Auto-injector Inject 0.3 mL (1 each total) as directed as needed. 1 each 3    levocetirizine 5 MG Oral Tab Take 1 tablet (5 mg total) by mouth every evening.      azelastine 0.1 % Nasal Solution 2 sprays by Nasal route 2 (two) times daily as needed. 90 mL 3    metFORMIN 500 MG Oral Tab Take 1 tablet (500 mg total) by mouth 2 (two) times daily with meals. 180 tablet 3    liothyronine 5 MCG Oral Tab Take 1 tablet (5 mcg total) by mouth daily. 90 tablet 3    Cholecalciferol (VITAMIN D) 1000 UNITS Oral Tab Take 1,000 Units by mouth daily.        Acidophilus/Pectin Oral Cap Take 1 capsule by mouth daily.      MONTELUKAST 10 MG Oral Tab TAKE 1 TABLET(10 MG) BY MOUTH EVERY NIGHT (Patient not taking: Reported on 2025) 90 tablet 1    ALPRAZolam (XANAX) 0.25 MG Oral Tab Take 1 tablet (0.25 mg total) by mouth nightly as needed for  Anxiety. (Patient not taking: Reported on 4/24/2025) 20 tablet 0     No current facility-administered medications on file prior to visit.   [5]   Family History  Problem Relation Age of Onset    Diabetes Sister     Ovarian Cancer Sister     Other (ovarian cancer) Sister     Asthma Sister     Stroke Other     Cancer Other     Diabetes Father     Hypertension Father     Other (thyroid disease) Mother     Other (lymphoma) Mother     Dementia Mother     No Known Problems Sister     Stroke Paternal Grandmother     Asthma Son

## 2025-05-20 ENCOUNTER — LABORATORY ENCOUNTER (OUTPATIENT)
Dept: LAB | Age: 55
End: 2025-05-20
Attending: INTERNAL MEDICINE
Payer: COMMERCIAL

## 2025-05-20 DIAGNOSIS — Z00.00 LABORATORY EXAM ORDERED AS PART OF ROUTINE GENERAL MEDICAL EXAMINATION: ICD-10-CM

## 2025-05-20 DIAGNOSIS — E66.811 CLASS 1 OBESITY WITHOUT SERIOUS COMORBIDITY WITH BODY MASS INDEX (BMI) OF 32.0 TO 32.9 IN ADULT, UNSPECIFIED OBESITY TYPE: Primary | ICD-10-CM

## 2025-05-20 DIAGNOSIS — E11.8 TYPE 2 DIABETES MELLITUS WITH COMPLICATION, WITHOUT LONG-TERM CURRENT USE OF INSULIN (HCC): ICD-10-CM

## 2025-05-20 LAB
ALBUMIN SERPL-MCNC: 4.7 G/DL (ref 3.2–4.8)
ALBUMIN/GLOB SERPL: 2.1 {RATIO} (ref 1–2)
ALP LIVER SERPL-CCNC: 71 U/L (ref 41–108)
ALT SERPL-CCNC: 15 U/L (ref 10–49)
ANION GAP SERPL CALC-SCNC: 8 MMOL/L (ref 0–18)
AST SERPL-CCNC: 11 U/L (ref ?–34)
BASOPHILS # BLD AUTO: 0.14 X10(3) UL (ref 0–0.2)
BASOPHILS NFR BLD AUTO: 1 %
BILIRUB SERPL-MCNC: 0.3 MG/DL (ref 0.3–1.2)
BUN BLD-MCNC: 13 MG/DL (ref 9–23)
CALCIUM BLD-MCNC: 9.5 MG/DL (ref 8.7–10.6)
CHLORIDE SERPL-SCNC: 105 MMOL/L (ref 98–112)
CHOLEST SERPL-MCNC: 219 MG/DL (ref ?–200)
CO2 SERPL-SCNC: 28 MMOL/L (ref 21–32)
CREAT BLD-MCNC: 0.98 MG/DL (ref 0.55–1.02)
EGFRCR SERPLBLD CKD-EPI 2021: 68 ML/MIN/1.73M2 (ref 60–?)
EOSINOPHIL # BLD AUTO: 0.28 X10(3) UL (ref 0–0.7)
EOSINOPHIL NFR BLD AUTO: 2.1 %
ERYTHROCYTE [DISTWIDTH] IN BLOOD BY AUTOMATED COUNT: 13.6 %
EST. AVERAGE GLUCOSE BLD GHB EST-MCNC: 123 MG/DL (ref 68–126)
FASTING PATIENT LIPID ANSWER: YES
FASTING STATUS PATIENT QL REPORTED: YES
GLOBULIN PLAS-MCNC: 2.2 G/DL (ref 2–3.5)
GLUCOSE BLD-MCNC: 104 MG/DL (ref 70–99)
HBA1C MFR BLD: 5.9 % (ref ?–5.7)
HCT VFR BLD AUTO: 40.8 % (ref 35–48)
HDLC SERPL-MCNC: 44 MG/DL (ref 40–59)
HGB BLD-MCNC: 13.1 G/DL (ref 12–16)
IMM GRANULOCYTES # BLD AUTO: 0.04 X10(3) UL (ref 0–1)
IMM GRANULOCYTES NFR BLD: 0.3 %
LDLC SERPL CALC-MCNC: 149 MG/DL (ref ?–100)
LYMPHOCYTES # BLD AUTO: 4.71 X10(3) UL (ref 1–4)
LYMPHOCYTES NFR BLD AUTO: 34.7 %
MCH RBC QN AUTO: 29.7 PG (ref 26–34)
MCHC RBC AUTO-ENTMCNC: 32.1 G/DL (ref 31–37)
MCV RBC AUTO: 92.5 FL (ref 80–100)
MONOCYTES # BLD AUTO: 0.75 X10(3) UL (ref 0.1–1)
MONOCYTES NFR BLD AUTO: 5.5 %
NEUTROPHILS # BLD AUTO: 7.66 X10 (3) UL (ref 1.5–7.7)
NEUTROPHILS # BLD AUTO: 7.66 X10(3) UL (ref 1.5–7.7)
NEUTROPHILS NFR BLD AUTO: 56.4 %
NONHDLC SERPL-MCNC: 175 MG/DL (ref ?–130)
OSMOLALITY SERPL CALC.SUM OF ELEC: 292 MOSM/KG (ref 275–295)
PLATELET # BLD AUTO: 352 10(3)UL (ref 150–450)
POTASSIUM SERPL-SCNC: 3.7 MMOL/L (ref 3.5–5.1)
PROT SERPL-MCNC: 6.9 G/DL (ref 5.7–8.2)
RBC # BLD AUTO: 4.41 X10(6)UL (ref 3.8–5.3)
SODIUM SERPL-SCNC: 141 MMOL/L (ref 136–145)
TRIGL SERPL-MCNC: 144 MG/DL (ref 30–149)
VLDLC SERPL CALC-MCNC: 27 MG/DL (ref 0–30)
WBC # BLD AUTO: 13.6 X10(3) UL (ref 4–11)

## 2025-05-20 PROCEDURE — 80061 LIPID PANEL: CPT | Performed by: INTERNAL MEDICINE

## 2025-05-20 PROCEDURE — 83036 HEMOGLOBIN GLYCOSYLATED A1C: CPT | Performed by: INTERNAL MEDICINE

## 2025-05-20 PROCEDURE — 80053 COMPREHEN METABOLIC PANEL: CPT | Performed by: INTERNAL MEDICINE

## 2025-05-20 PROCEDURE — 85025 COMPLETE CBC W/AUTO DIFF WBC: CPT | Performed by: INTERNAL MEDICINE

## 2025-05-21 RX ORDER — TIRZEPATIDE 10 MG/.5ML
10 INJECTION, SOLUTION SUBCUTANEOUS WEEKLY
Qty: 6 ML | Refills: 0 | Status: SHIPPED | OUTPATIENT
Start: 2025-05-21

## 2025-05-21 NOTE — TELEPHONE ENCOUNTER
Protocol failed     LOV: 10/30/24   RTC: 6-8 weeks   Labs: 5/20/25   Future Appointments   Date Time Provider Department Center   6/20/2025  9:45 AM Ann Vidal MD [facility] [place]   4/27/2026  1:30 PM Zeyad Kiran MD [facility] [address]

## 2025-06-03 ENCOUNTER — TELEPHONE (OUTPATIENT)
Facility: CLINIC | Age: 55
End: 2025-06-03

## 2025-06-03 DIAGNOSIS — N60.01 BREAST CYST, RIGHT: Primary | ICD-10-CM

## 2025-06-03 NOTE — TELEPHONE ENCOUNTER
Stable large 4.6 cm cystic structure in right breast - stable over 6 months. (Alvin)  Patient can not fell  Offered FNA if worried or symptomatic.    Qiana  6/3/25

## 2025-06-04 ENCOUNTER — MED REC SCAN ONLY (OUTPATIENT)
Facility: CLINIC | Age: 55
End: 2025-06-04

## 2025-06-09 ENCOUNTER — TELEPHONE (OUTPATIENT)
Facility: CLINIC | Age: 55
End: 2025-06-09

## 2025-06-09 DIAGNOSIS — N60.01 CYST OF BREAST, RIGHT: Primary | ICD-10-CM

## 2025-06-09 NOTE — TELEPHONE ENCOUNTER
Right breast ultrasound report from [facility] reviewed. Pt due for bilateral diagnostic mammogram and right breast ultrasound in 6 months, orders placed.

## 2025-06-10 ENCOUNTER — TELEPHONE (OUTPATIENT)
Facility: CLINIC | Age: 55
End: 2025-06-10

## 2025-06-10 NOTE — TELEPHONE ENCOUNTER
Called patient to notify of repeat ultrasound and bilateral diagnostic mammogram in 6 months. Patient verbalized understanding.

## 2025-06-11 ENCOUNTER — MED REC SCAN ONLY (OUTPATIENT)
Facility: CLINIC | Age: 55
End: 2025-06-11

## 2025-06-20 ENCOUNTER — OFFICE VISIT (OUTPATIENT)
Dept: INTERNAL MEDICINE CLINIC | Facility: CLINIC | Age: 55
End: 2025-06-20
Payer: COMMERCIAL

## 2025-06-20 VITALS
HEIGHT: 67 IN | SYSTOLIC BLOOD PRESSURE: 118 MMHG | WEIGHT: 201.19 LBS | OXYGEN SATURATION: 96 % | BODY MASS INDEX: 31.58 KG/M2 | TEMPERATURE: 97 F | HEART RATE: 73 BPM | DIASTOLIC BLOOD PRESSURE: 72 MMHG | RESPIRATION RATE: 18 BRPM

## 2025-06-20 DIAGNOSIS — K59.03 DRUG-INDUCED CONSTIPATION: ICD-10-CM

## 2025-06-20 DIAGNOSIS — E66.811 OBESITY (BMI 30.0-34.9): ICD-10-CM

## 2025-06-20 DIAGNOSIS — E11.8 TYPE 2 DIABETES MELLITUS WITH COMPLICATION, WITHOUT LONG-TERM CURRENT USE OF INSULIN (HCC): ICD-10-CM

## 2025-06-20 DIAGNOSIS — R53.83 FATIGUE, UNSPECIFIED TYPE: ICD-10-CM

## 2025-06-20 DIAGNOSIS — Z00.00 ENCOUNTER FOR ROUTINE ADULT MEDICAL EXAMINATION: Primary | ICD-10-CM

## 2025-06-20 DIAGNOSIS — E55.9 HYPOVITAMINOSIS D: ICD-10-CM

## 2025-06-20 DIAGNOSIS — E53.8 VITAMIN B12 DEFICIENCY: ICD-10-CM

## 2025-06-20 LAB
CREAT UR-SCNC: 53 MG/DL
MICROALBUMIN UR-MCNC: <0.3 MG/DL

## 2025-06-20 PROCEDURE — 82570 ASSAY OF URINE CREATININE: CPT | Performed by: INTERNAL MEDICINE

## 2025-06-20 PROCEDURE — 82043 UR ALBUMIN QUANTITATIVE: CPT | Performed by: INTERNAL MEDICINE

## 2025-06-20 RX ORDER — EPINEPHRINE 0.3 MG/.3ML
0.3 INJECTION, SOLUTION INTRAMUSCULAR AS NEEDED
Qty: 1 EACH | Refills: 3 | Status: SHIPPED | OUTPATIENT
Start: 2025-06-20

## 2025-06-20 RX ORDER — TIRZEPATIDE 10 MG/.5ML
10 INJECTION, SOLUTION SUBCUTANEOUS WEEKLY
Qty: 6 ML | Refills: 1 | Status: SHIPPED | OUTPATIENT
Start: 2025-06-20

## 2025-06-20 NOTE — PATIENT INSTRUCTIONS
Miralax over the counter as needed for constipation or can be taken daily for prevention of constipation      Urine microalbumin today    Vaccines recommended:   Shingrix shingles vaccine   Tdap tetanus/whooping cough   [id number] is pneumonia vaccine

## 2025-06-20 NOTE — PROGRESS NOTES
[facility] Internal Medicine Office Note  Chief Complaint:   Chief Complaint   Patient presents with    Physical       HPI:   This is a 54 year old female coming in for physical  HPI       The following individual(s) verbally consented to be recorded using ambient AI listening technology and understand that they can each withdraw their consent to this listening technology at any point by asking the clinician to turn off or pause the recording:    Patient name: Ngozi Mathew    History of Present Illness  Ngozi Mathew is a 54 year old female who presents with concerns about weight gain and stress management.    She has gained three to four pounds since April, attributed to stress. She does not generally eat until 10pm. She notices abdominal 'puffiness' with form-fitting clothing. Despite taking Mounjaro and metformin, she is not experiencing significant weight loss. Her A1c has improved.    She experiences constipation, possibly related to medication, and manages it by increasing water intake. She has a history of hemorrhoids and occasional stomach aches.     She feels extremely stressed due to caregiving for her mother with Alzheimer's and recent stroke, compounded by family dynamics and financial pressures. She attends weekly therapy sessions.    She frequently feels cold, a sensation that began after a COVID-19 infection four years ago, and has adjusted her home environment accordingly. She experiences significant fatigue. Her thyroid levels were normal six months ago. She is concerned about vitamin D and B12 levels due to past deficiencies.       Problem List[1]  Past Surgical History[2]  Family History[3]     I reviewed her's Past Medical History, Past Surgical History, Family History and   Social History updated shows  Short Social Hx on File[4]  Allergies:  Allergies[5]  Current Medications[6]      REVIEW OF SYSTEMS:   Review of Systems   Constitutional:  Negative for fever.   HENT:   Negative for congestion.    Eyes:  Negative for visual disturbance.   Respiratory:  Negative for shortness of breath.    Cardiovascular:  Negative for chest pain.   Gastrointestinal:  Negative for constipation.   Genitourinary:  Negative for dysuria.   Neurological: Negative.    Hematological: Negative.    Psychiatric/Behavioral: Negative.          EXAM:   /72   Pulse 73   Temp 97 °F (36.1 °C) (Temporal)   Resp 18   Ht 5' 7\" (1.702 m)   Wt 201 lb 3.2 oz (91.3 kg)   LMP 10/02/2018 (Approximate)   SpO2 96%   BMI 31.51 kg/m²  Estimated body mass index is 31.51 kg/m² as calculated from the following:    Height as of this encounter: 5' 7\" (1.702 m).    Weight as of this encounter: 201 lb 3.2 oz (91.3 kg).   Vital signs reviewed. Appears stated age, well groomed.  Physical Exam  Vitals reviewed.   Constitutional:       General: She is not in acute distress.     Appearance: She is well-developed.   HENT:      Head: Normocephalic and atraumatic.      Right Ear: Tympanic membrane normal.      Left Ear: Tympanic membrane normal.   Eyes:      Conjunctiva/sclera: Conjunctivae normal.   Cardiovascular:      Rate and Rhythm: Normal rate and regular rhythm.      Heart sounds: Normal heart sounds.   Pulmonary:      Effort: Pulmonary effort is normal.      Breath sounds: Normal breath sounds.   Musculoskeletal:      Cervical back: Neck supple.      Right lower leg: No edema.      Left lower leg: No edema.   Lymphadenopathy:      Cervical: No cervical adenopathy.   Skin:     General: Skin is warm and dry.   Neurological:      General: No focal deficit present.      Mental Status: She is alert.   Psychiatric:         Mood and Affect: Mood normal.        Bilateral barefoot skin diabetic exam is normal, visualized feet and the appearance is normal.  Bilateral monofilament/sensation of both feet is normal.  Pulsation pedal pulse exam of both lower legs/feet is normal as well.       ASSESSMENT AND PLAN:   Ngozi Mathew  is a 54 year old female with  1. Encounter for routine adult medical examination    2. Type 2 diabetes mellitus with complication, without long-term current use of insulin (HCC)    3. Fatigue, unspecified type    4. Hypovitaminosis D    5. Vitamin B12 deficiency    6. Obesity (BMI 30.0-34.9)    7. Drug induced constipation       The plan is as follows  Ngozi was seen today for physical.    Diagnoses and all orders for this visit:    Encounter for routine adult medical examination    Type 2 diabetes mellitus with complication, without long-term current use of insulin (HCC)    Fatigue, unspecified type  -     TSH W Reflex To Free T4; Future    Hypovitaminosis D  -     Vitamin D [E]; Future    Vitamin B12 deficiency  -     Vitamin B12; Future    Obesity (BMI 30.0-34.9)    Other orders  -     Tirzepatide (MOUNJARO) 10 MG/0.5ML Subcutaneous Solution Auto-injector; Inject 10 mg into the skin once a week.  -     AUVI-Q 0.3 MG/0.3ML Injection Solution Auto-injector; Inject 0.3 mL (1 each total) as directed as needed.        Assessment & Plan  Weight Gain  Weight gain likely due to stress and late-night eating. She asks about elevated cortisol levels. Discussed stress impact on cortisol and benefits of earlier meals and exercise.  - Advise earlier dinner time   - Encourage regular exercise and being outdoors for stress management.     Constipation  Constipation likely due to Mounjaro, causing bloating and constipation can worsen hemorrhoids.   - Recommend over-the-counter Miralax for constipation management.  - Advise titration of Miralax dosage as needed for bowel movement regularity.    Hemorrhoids  Hemorrhoids exacerbated by constipation and straining.  - Manage constipation to reduce straining and alleviate hemorrhoid symptoms.    Cold Sensation  Persistent cold sensation post-COVID, etiology unclear. Thyroid and hemoglobin levels normal. Discussed potential causes, including anemia and electrolyte imbalances, which  were ruled out.  - Order TSH for recheck and vitamin D levels to rule out thyroid dysfunction and vitamin D deficiency.    Fatigue  Fatigue likely multifactorial, can be related to stress and caregiving. No clear etiology from labs.   - Order vitamin D and B12 levels to assess for deficiencies.  - Encourage stress management techniques and regular exercise.    Diabetes  A1c at goal at 5.9. Continue present medications of mounjaro and metformin.  Requested copy of eye exam   Microalbumin today  Prevnar 20 recommended    General Health Maintenance  Routine health maintenance discussed. She is due for several vaccinations. Emphasized importance of staying up to date with vaccinations.  - Administer flu shot in the fall.  - Schedule shingles and pneumonia vaccinations.  - Recommend Tdap vaccination if not received in the past 10 years.    Follow-up  Follow-up plan discussed for monitoring health issues and medication management.  - Schedule follow-up appointment in 5-6 months.  - Order urine sample for microalbumin  - Requested eye exam records from [facility].  -no further pap smear needed due to hysterectomy   -mammogram ordered by gynecology        Orders Placed This Encounter   Procedures    TSH W Reflex To Free T4    Vitamin D [E]    Vitamin B12       Meds & Refills for this Visit:  Requested Prescriptions     Signed Prescriptions Disp Refills    Tirzepatide (MOUNJARO) 10 MG/0.5ML Subcutaneous Solution Auto-injector 6 mL 1     Sig: Inject 10 mg into the skin once a week.    AUVI-Q 0.3 MG/0.3ML Injection Solution Auto-injector 1 each 3     Sig: Inject 0.3 mL (1 each total) as directed as needed.       Imaging & Consults:  None    Health Maintenance Due   Topic Date Due    Pneumococcal Vaccine: 50+ Years (1 of 2 - PCV) Never done    DTaP,Tdap,and Td Vaccines (1 - Tdap) Never done    Zoster Vaccines (1 of 2) Never done    Pap Smear  10/10/2023    COVID-19 Vaccine (2 - 2024-25 season) 09/01/2024    Mammogram   2024    Annual Physical  2024    Diabetes Care Foot Exam  2024    Diabetes Care: Microalb/Creat Ratio (Annual)  2025    Diabetes Care Dilated Eye Exam  2025     Patient/Caregiver Education: Patient/Caregiver Education: There are no barriers to learning. Medical education done. Outcome: Patient verbalizes understanding. Patient is notified to call with any questions, complications, allergies, or worsening or changing symptoms.  Patient is to call with any side effects or complications from the treatments as a result of today.     Ann Vidal MD         [1]   Patient Active Problem List  Diagnosis    Type 2 diabetes mellitus with complication, without long-term current use of insulin (HCC)    S/P laparoscopic hysterectomy- fibroids 2018    Class 1 obesity without serious comorbidity in adult    Fatty liver    Hip pain, chronic, right    Estrogen replacement therapy (ERT) - started 2020 age 49    Mixed hyperlipidemia    Family history of ovarian cancer - sister not familial     Internal hemorrhoids    External hemorrhoids without complication    Allergic rhinitis    Migraine    Prediabetes    Seasonal allergic rhinitis due to pollen    Thyroid dysfunction   [2]   Past Surgical History:  Procedure Laterality Date    Colonoscopy      Hysterectomy  2018          Other surgical history  06/10/2021    removal of lipoma on back     Other surgical history  2022    sinus & nasal surgery    Skin surgery      Vaginal hysterectomy N/A 2018    Procedure: VAGINAL HYSTERECTOMY;  Surgeon: Zeyad Kiran MD;  Location:  MAIN OR   [3]   Family History  Problem Relation Age of Onset    Diabetes Sister     Ovarian Cancer Sister     Other (ovarian cancer) Sister     Asthma Sister     Stroke Other     Cancer Other     Diabetes Father     Hypertension Father     Other (thyroid disease) Mother     Other (lymphoma) Mother     Dementia Mother     No Known Problems Sister      Stroke Paternal Grandmother     Asthma Son    [4]   Social History  Socioeconomic History    Marital status:    Tobacco Use    Smoking status: Former     Current packs/day: 0.00     Average packs/day: 0.3 packs/day for 15.0 years (3.8 ttl pk-yrs)     Types: Cigarettes     Start date: 1989     Quit date: 2004     Years since quittin.4     Passive exposure: Past    Smokeless tobacco: Never   Vaping Use    Vaping status: Never Used   Substance and Sexual Activity    Alcohol use: Yes     Comment: Less than one per week    Drug use: No    Sexual activity: Yes     Partners: Male     Birth control/protection: Hysterectomy   Other Topics Concern    Caffeine Concern No    Stress Concern Yes    Weight Concern Yes    Special Diet No    Exercise Yes    Seat Belt Yes     Social Drivers of Health     Food Insecurity: No Food Insecurity (2025)    NCSS - Food Insecurity     Worried About Running Out of Food in the Last Year: No     Ran Out of Food in the Last Year: No   Transportation Needs: No Transportation Needs (2025)    NCSS - Transportation     Lack of Transportation: No   Housing Stability: Not At Risk (2025)    NCSS - Housing/Utilities     Has Housing: Yes     Worried About Losing Housing: No     Unable to Get Utilities: No   [5]   Allergies  Allergen Reactions    Wasps SWELLING    Coffee Bean Extract [Coffea Arabica] OTHER (SEE COMMENTS)     Per allergy testing    [place] UNKNOWN     Swelling, inflammation, SOB    Erythromycin OTHER (SEE COMMENTS)     Abdominal pain    Other OTHER (SEE COMMENTS)     Cayenne pepper- SOB    Pcn [Penicillins] REACTIVE AIRWAY DISEASE    Pork Derived Products OTHER (SEE COMMENTS)     GI upset   [6]   Current Outpatient Medications   Medication Sig Dispense Refill    Tirzepatide (MOUNJARO) 10 MG/0.5ML Subcutaneous Solution Auto-injector Inject 10 mg into the skin once a week. 6 mL 1    AUVI-Q 0.3 MG/0.3ML Injection Solution Auto-injector Inject 0.3 mL (1 each  total) as directed as needed. 1 each 3    Fluocinolone Acetonide 0.01 % Otic Oil 2 drops by Each ear route.      estradiol 0.5 MG Oral Tab Take 1 tablet (0.5 mg total) by mouth daily. 90 tablet 3    levocetirizine 5 MG Oral Tab Take 1 tablet (5 mg total) by mouth every evening.      azelastine 0.1 % Nasal Solution 2 sprays by Nasal route 2 (two) times daily as needed. 90 mL 3    metFORMIN 500 MG Oral Tab Take 1 tablet (500 mg total) by mouth 2 (two) times daily with meals. 180 tablet 3    liothyronine 5 MCG Oral Tab Take 1 tablet (5 mcg total) by mouth daily. 90 tablet 3    Cholecalciferol (VITAMIN D) 1000 UNITS Oral Tab Take 1,000 Units by mouth daily.        Acidophilus/Pectin Oral Cap Take 1 capsule by mouth daily.      ibuprofen 800 MG Oral Tab  (Patient not taking: Reported on 6/20/2025)      MONTELUKAST 10 MG Oral Tab TAKE 1 TABLET(10 MG) BY MOUTH EVERY NIGHT (Patient not taking: Reported on 6/20/2025) 90 tablet 1    ALPRAZolam (XANAX) 0.25 MG Oral Tab Take 1 tablet (0.25 mg total) by mouth nightly as needed for Anxiety. (Patient not taking: Reported on 6/20/2025) 20 tablet 0

## 2025-07-02 RX ORDER — TIRZEPATIDE 12.5 MG/.5ML
12.5 INJECTION, SOLUTION SUBCUTANEOUS WEEKLY
Qty: 6 ML | Refills: 1 | Status: SHIPPED | OUTPATIENT
Start: 2025-07-02

## 2025-07-02 RX ORDER — TIRZEPATIDE 10 MG/.5ML
10 INJECTION, SOLUTION SUBCUTANEOUS WEEKLY
Qty: 6 ML | Refills: 1 | Status: CANCELLED | OUTPATIENT
Start: 2025-07-02

## 2025-07-02 NOTE — TELEPHONE ENCOUNTER
Patient comment: Can I get  the next higher dose? I would like go try going up to 12.5.     LOV: 6/20/25   RTC: 6 months  Labs: 5/20/25   Future Appointments   Date Time Provider Department Center   4/27/2026  1:30 PM Zeyad Kiran MD [facility] [address]

## 2025-07-09 ENCOUNTER — HOSPITAL ENCOUNTER (OUTPATIENT)
Age: 55
Discharge: HOME | End: 2025-07-09
Payer: COMMERCIAL

## 2025-07-09 VITALS
RESPIRATION RATE: 18 BRPM | SYSTOLIC BLOOD PRESSURE: 112 MMHG | TEMPERATURE: 99 F | OXYGEN SATURATION: 98 % | HEART RATE: 93 BPM | DIASTOLIC BLOOD PRESSURE: 83 MMHG

## 2025-07-09 DIAGNOSIS — H69.93 DYSFUNCTION OF BOTH EUSTACHIAN TUBES: Primary | ICD-10-CM

## 2025-07-09 PROCEDURE — 99213 OFFICE O/P EST LOW 20 MIN: CPT | Performed by: PHYSICIAN ASSISTANT

## 2025-07-09 RX ORDER — DEXAMETHASONE 4 MG/1
8 TABLET ORAL ONCE
Status: COMPLETED | OUTPATIENT
Start: 2025-07-09 | End: 2025-07-09

## 2025-07-09 NOTE — ED PROVIDER NOTES
Patient Seen in: [facility]        History  Chief Complaint   Patient presents with    Ear Problem     Entered by patient     Stated Complaint: Ear Problem    Subjective:   The history is provided by the patient.               54-year-old female with past medical history of allergic rhinitis, fibromyalgia, prediabetes presents to the immediate care due to bilateral ear pain and pressure for the past several weeks.  The pain is intermittent described as fullness and pressure at times.  Some stabbing pain in the right ear at times.  No muffled hearing.  No drainage.  No fevers.  Does suffer from seasonal allergies compliant with Xyzal and nasal sprays.  Continues to have some rhinorrhea and nasal congestion throughout the season.  No history of PE tubes.  Patient is concerned as she is flying to [place] in the next week      Objective:     Past Medical History:    Abdominal distention    Allergic rhinitis    Anemia    Bloating    Body piercing    Brachial neuritis or radiculitis NOS    Diabetes mellitus (HCC)    Disorder of thyroid    Dizziness and giddiness    Fatigue    Fibroid uterus    Fibroids    Fibromyalgia    Frequency of urination    Heartburn    Hypothyroidism    Menorrhagia    Multiple thyroid nodules    Other abnormal glucose    Other and unspecified hyperlipidemia    Other malaise and fatigue    Peripheral vertigo, unspecified    Prediabetes    Seasonal allergies    Vertigo    (on and off for 10 yrs)    Visual impairment    contact lenses    Wears glasses              Past Surgical History:   Procedure Laterality Date    Colonoscopy      Hysterectomy  2018          Other surgical history  06/10/2021    removal of lipoma on back     Other surgical history  2022    sinus & nasal surgery    Skin surgery      Vaginal hysterectomy N/A 2018    Procedure: VAGINAL HYSTERECTOMY;  Surgeon: Zeyad Kiran MD;  Location:  MAIN OR                No pertinent social  history.            Review of Systems   Constitutional: Negative.    HENT:  Positive for congestion and ear pain. Negative for ear discharge, hearing loss, sore throat, trouble swallowing and voice change.    Respiratory: Negative.     Cardiovascular: Negative.        Positive for stated complaint: Ear Problem  Other systems are as noted in HPI.  Constitutional and vital signs reviewed.      All other systems reviewed and negative except as noted above.                  Physical Exam    ED Triage Vitals [07/09/25 1652]   /83   Pulse 93   Resp 18   Temp 98.7 °F (37.1 °C)   Temp src Oral   SpO2 98 %   O2 Device None (Room air)       Current Vitals:   Vital Signs  BP: 112/83  Pulse: 93  Resp: 18  Temp: 98.7 °F (37.1 °C)  Temp src: Oral    Oxygen Therapy  SpO2: 98 %  O2 Device: None (Room air)            Physical Exam  Vitals and nursing note reviewed.   Constitutional:       General: She is not in acute distress.     Appearance: Normal appearance. She is not toxic-appearing.   HENT:      Head: Normocephalic.      Ears:      Comments: No mastoid tenderness bilaterally.  Bilateral EACs unremarkable. serous fluid behind both TMs.  No erythema not bulging.     Nose: Congestion present.      Mouth/Throat:      Mouth: Mucous membranes are moist.      Pharynx: No posterior oropharyngeal erythema.   Eyes:      Conjunctiva/sclera: Conjunctivae normal.   Cardiovascular:      Rate and Rhythm: Normal rate and regular rhythm.   Pulmonary:      Effort: Pulmonary effort is normal.      Breath sounds: Normal breath sounds.   Musculoskeletal:         General: Normal range of motion.      Cervical back: Normal range of motion.   Lymphadenopathy:      Cervical: No cervical adenopathy.   Skin:     General: Skin is warm.   Neurological:      General: No focal deficit present.      Mental Status: She is alert and oriented to person, place, and time.   Psychiatric:         Mood and Affect: Mood normal.         Behavior: Behavior  normal.                 ED Course  Labs Reviewed - No data to display                         MDM  ddx- AOE, AOM, eustachian tube dysfunction, cerumen impaction mastoiditis     on exam the patient is afebrile nontoxic. Vitals are stable.  pinna shows no acute findings, EAC unremarkable.  No mastoid tenderness. TMs with serous effusion no erythema no bulging. rest of the exam shows no acute findings. Exam is consistent with eustachian tube dysfunction most likely related to his or allergies.  Was given a dose of Decadron.. discussed at lengths with the patient home care and strict return precautions.  All questions were answered and comfortable with MI home      Medical Decision Making  Problems Addressed:  Dysfunction of both eustachian tubes: acute illness or injury    Risk  OTC drugs.  Prescription drug management.        Disposition and Plan     Clinical Impression:  1. Dysfunction of both eustachian tubes         Disposition:  Discharge  7/9/2025  5:12 pm    Follow-up:  Ann Vidal MD  [address] [address] [phone]          Thee Black MD  [address] [place] [id number] [phone]                Medications Prescribed:  Discharge Medication List as of 7/9/2025  5:16 PM                Supplementary Documentation:

## 2025-07-09 NOTE — DISCHARGE INSTRUCTIONS
Ibuprofen Tylenol as needed for pain  Continue your allergy medication  The decadron will stay in your system for the next 3 days  Close follow-up with your primary care  Return here symptoms

## 2025-07-09 NOTE — ED INITIAL ASSESSMENT (HPI)
Patient reports bilateral ear discomfort, worse to right ear, for at least \"a couple of weeks\" now per patient, intermittently feels like her right ear is \"under water\", denies fever

## 2025-07-11 ENCOUNTER — LABORATORY ENCOUNTER (OUTPATIENT)
Dept: LAB | Age: 55
End: 2025-07-11
Attending: INTERNAL MEDICINE
Payer: COMMERCIAL

## 2025-07-11 DIAGNOSIS — E55.9 HYPOVITAMINOSIS D: ICD-10-CM

## 2025-07-11 DIAGNOSIS — R53.83 FATIGUE, UNSPECIFIED TYPE: ICD-10-CM

## 2025-07-11 DIAGNOSIS — E53.8 VITAMIN B12 DEFICIENCY: ICD-10-CM

## 2025-07-11 LAB
TSI SER-ACNC: 1.01 UIU/ML (ref 0.55–4.78)
VIT B12 SERPL-MCNC: 434 PG/ML (ref 211–911)
VIT D+METAB SERPL-MCNC: 49.4 NG/ML (ref 30–100)

## 2025-07-11 PROCEDURE — 36415 COLL VENOUS BLD VENIPUNCTURE: CPT

## 2025-07-11 PROCEDURE — 84443 ASSAY THYROID STIM HORMONE: CPT

## 2025-07-11 PROCEDURE — 82306 VITAMIN D 25 HYDROXY: CPT

## 2025-07-11 PROCEDURE — 82607 VITAMIN B-12: CPT

## 2025-07-29 DIAGNOSIS — E11.8 TYPE 2 DIABETES MELLITUS WITH COMPLICATION, WITHOUT LONG-TERM CURRENT USE OF INSULIN (HCC): Primary | ICD-10-CM

## 2025-07-29 RX ORDER — TIRZEPATIDE 15 MG/.5ML
15 INJECTION, SOLUTION SUBCUTANEOUS WEEKLY
Qty: 2 ML | Refills: 1 | Status: SHIPPED | OUTPATIENT
Start: 2025-07-29

## 2025-07-29 RX ORDER — TIRZEPATIDE 12.5 MG/.5ML
12.5 INJECTION, SOLUTION SUBCUTANEOUS WEEKLY
Qty: 6 ML | Refills: 1 | Status: CANCELLED | OUTPATIENT
Start: 2025-07-29

## (undated) DIAGNOSIS — Z51.81 THERAPEUTIC DRUG MONITORING: ICD-10-CM

## (undated) DIAGNOSIS — E66.9 OBESITY (BMI 30-39.9): ICD-10-CM

## (undated) DIAGNOSIS — E11.8 TYPE 2 DIABETES MELLITUS WITH COMPLICATION, WITHOUT LONG-TERM CURRENT USE OF INSULIN (HCC): ICD-10-CM

## (undated) DEVICE — DERMABOND LIQUID ADHESIVE

## (undated) DEVICE — GYN CDS: Brand: MEDLINE INDUSTRIES, INC.

## (undated) DEVICE — SUTURE VICRYL 0

## (undated) DEVICE — GAMMEX® NON-LATEX PI TEXTURED SIZE 7.5, STERILE POLYISOPRENE POWDER-FREE SURGICAL GLOVE: Brand: GAMMEX

## (undated) DEVICE — SOL H2O 1000ML BTL

## (undated) DEVICE — DV OBTURATOR BLADELESS 8MM

## (undated) DEVICE — PK INSTRUMENT CORD, G400 GENERATOR: Brand: PK

## (undated) DEVICE — SUTURE MONOCRYL 4-0 PS-2

## (undated) DEVICE — AIRSEAL 5 MM ACCESS PORT AND LOW PROFILE OBTURATOR WITH BLADELESS OPTICAL TIP, 120 MM LENGTH: Brand: AIRSEAL

## (undated) DEVICE — VIOLET BRAIDED (POLYGLACTIN 910), SYNTHETIC ABSORBABLE SUTURE: Brand: COATED VICRYL

## (undated) DEVICE — GYN LAP/ROBOTIC: Brand: MEDLINE INDUSTRIES, INC.

## (undated) DEVICE — DV KIT ACCESSORY 3-ARM DISP

## (undated) DEVICE — GYN LAP CDS: Brand: MEDLINE INDUSTRIES, INC.

## (undated) DEVICE — VCARE LARGE, UTERINE MANIPULATOR, VAGINAL-CERVICAL-AHLUWALIA'S-RETRACTOR-ELEVATOR: Brand: VCARE

## (undated) DEVICE — 40580 - THE PINK PAD - ADVANCED TRENDELENBURG POSITIONING KIT: Brand: 40580 - THE PINK PAD - ADVANCED TRENDELENBURG POSITIONING KIT

## (undated) DEVICE — SUTURE VLOC 90 2-0 9\" 2145

## (undated) DEVICE — SOL  .9 1000ML BTL

## (undated) DEVICE — GOWN,SIRUS,FABRIC-REINFORCED,X-LARGE: Brand: MEDLINE

## (undated) DEVICE — TRI-LUMEN FILTERED TUBE SET WITH ACTIVATED CHARCOAL FILTER: Brand: AIRSEAL

## (undated) DEVICE — TROCAR: Brand: KII FIOS FIRST ENTRY

## (undated) DEVICE — TIP COVER ACCESSORY

## (undated) DEVICE — INSUFFLATION NEEDLE TO ESTABLISH PNEUMOPERITONEUM.: Brand: INSUFFLATION NEEDLE

## (undated) DEVICE — 2, DISPOSABLE SUCTION/IRRIGATOR WITHOUT DISPOSABLE TIP: Brand: STRYKEFLOW

## (undated) DEVICE — ULNAR NERVE CUSHION

## (undated) DEVICE — KENDALL SCD EXPRESS SLEEVES, KNEE LENGTH, MEDIUM: Brand: KENDALL SCD

## (undated) DEVICE — ELECTRO LUBE IS A SINGLE PATIENT USE DEVICE THAT IS INTENDED TO BE USED ON ELECTROSURGICAL ELECTRODES TO REDUCE STICKING.: Brand: KEY SURGICAL ELECTRO LUBE

## (undated) DEVICE — MANIPULATOR CATH ESCP KRNR

## (undated) DEVICE — COBRA GRASPER: Brand: ENDOWRIST;DAVINCI SI

## (undated) NOTE — ED AVS SNAPSHOT
Dorian Porter   MRN: [id number]    Department:  [facility] [place]   Date of Visit:  4/1/2019           Disclosure     Insurance plans vary and the physician(s) referred by the ER may not be covered by your plan.  Please contac tell this physician (or your personal doctor if your instructions are to return to your personal doctor) about any new or lasting problems. The primary care or specialist physician will see patients referred from the [facility] Emergency Department.  Shelton Lombard

## (undated) NOTE — LETTER
Patient Name: Boston Best  YOB: 1970          MRN number:  [id number]  Date:  12/10/2019  Referring Physician:  Radha Perez         INITIAL EVALUATION:    Referring Physician: Dr. Shweta Castellanos  Diagnosis:    -Right hip pain    -Chronic pain o years; generally has been more present after the last year; denies saddle paraesthesia;   -Neck pain C/TH junction; stiffness in the back of neck; sharp pain with return her neck from flexed position; impacts sleep; generalized stiffness in am; shares hist Observation/gait/posture:   -Forward head posture; upper thoracic flexion with upper cervical extension; shoulder girdle protraction; anterior inclination of the pelvis with increased lower lumbar lordosis  -Gait is with mild reduction in hip flexion and l discussed related impairment in muscle performance; we outlined anticipate management from the perspective of physical therapist; questions were answered; follow-up visits were scheduled.     ASSESSMENT:   -51 year female with medical history of metabolic s multiple body regions, likely evolving clinical presentation    Precautions:  None      PLAN OF CARE:    Goals:    (12 visits)  -Demonstrate active movements of cervical region without complaint of pain/stiffness such as with rotation and return from flexe

## (undated) NOTE — LETTER
11/26/21        Ngozi Mathew  [address] [place] [address]      Dear Rebekah Monday records indicate that you have outstanding lab work and or testing that was ordered for you and has not yet been completed:  Orders Placed This Encount

## (undated) NOTE — LETTER
[facility] Sandra Mora  [address] [place] [address] [address]          12/14/22    Re: Thor Days    To Whom It May Concern:     Ms. Moi Winkler is a patient of mine at [facility]. She was diagnosed on 12/7/22 with COVID-19 infection. Please excuse her from work through 12/12/22. Sincerely,     Raiza Ramirez.  Lu Carl M.D.

## (undated) NOTE — LETTER
[address], [address] [facility], [address] [facility]  [phone]            January 29, 2020      Nayana Davila [facility] [address]      Dear Ms.  Sa

## (undated) NOTE — LETTER
21    Patient: Fatoumata Vivas  : 1970 Visit date: 2021    Dear  [address] [place] [address],     Thank you for referring Fatoumata Vivas to my practice. Please find my assessment and plan below.        Assessment   Lipoma of torso  (primary

## (undated) NOTE — LETTER
Shakila Smallwood [address] [address]  [place], [address] [facility]    Consent for Operation  Date: __________________                                Time: _______________    1.  I authorize the performance upon Ngozi Mathew the following operation:  Procedjosephine procedure has been videotaped, the surgeon will obtain the original videotape. The hospital will not be responsible for storage or maintenance of this tape.   7. For the purpose of advancing medical education, I consent to the admittance of observers to the STATEMENTS REQUIRING INSERTION OR COMPLETION WERE FILLED IN.     Signature of Patient:   ___________________________    When the patient is a minor or mentally incompetent to give consent:  Signature of person authorized to consent for patient: ____________ supplements, and pills I can buy without a prescription (including street drugs/illegal medications). Failure to inform my anesthesiologist about these medicines may increase my risk of anesthetic complications. iv.  If I am allergic to anything or have ha Anesthesiologist Signature     Date   Time  I have discussed the procedure and information above with the patient (or patient’s representative) and answered their questions. The patient or their representative has agreed to have anesthesia services.     ___

## (undated) NOTE — LETTER
Shakila Smallwood [address] [address]  [place], [address] [facility]    Consent for Operation  Date: __________________                                Time: _______________    1.  I authorize the performance upon Ngozi Mathew the following operation:  Procedjosephine procedure has been videotaped, the surgeon will obtain the original videotape. The hospital will not be responsible for storage or maintenance of this tape.   7. For the purpose of advancing medical education, I consent to the admittance of observers to the STATEMENTS REQUIRING INSERTION OR COMPLETION WERE FILLED IN.     Signature of Patient:   ___________________________    When the patient is a minor or mentally incompetent to give consent:  Signature of person authorized to consent for patient: ____________ supplements, and pills I can buy without a prescription (including street drugs/illegal medications). Failure to inform my anesthesiologist about these medicines may increase my risk of anesthetic complications. iv.  If I am allergic to anything or have ha Anesthesiologist Signature     Date   Time  I have discussed the procedure and information above with the patient (or patient’s representative) and answered their questions. The patient or their representative has agreed to have anesthesia services.     ___

## (undated) NOTE — LETTER
Patient Name: Christi Mason  YOB: 1970          MRN :  [id number]  Date:  12/10/2019  Referring Physician:  Kobi Uribe    INITIAL EVALUATION:    Referring Physician: Dr. Felicia Lopez  Diagnosis:    -Right hip pain    -Chronic pain of right kne paraesthesia;   -Neck pain C/TH junction; stiffness in the back of neck; sharp pain with return her neck from flexed position; impacts sleep; generalized stiffness in am; shares history of vestibular disorder; describes what sounds like peripheral vestibul extension; shoulder girdle protraction; anterior inclination of the pelvis with increased lower lumbar lordosis  -Gait is with mild reduction in hip flexion and lower lumbar rotation; comparable hip pain is present within right stance with subtle right lat answered; follow-up visits were scheduled. ASSESSMENT:   -51 year female with medical history of metabolic syndrome and prediabetes per patient and chart review is present with various pain complaints.   Right \"hip\" pain is located in trochanteric victoriano -Demonstrate active movements of cervical region without complaint of pain/stiffness such as with rotation and return from flexed position such as with reading or attending to daily activities  -Demonstrate at least 4/5 R gluteus medius strength with pain

## (undated) NOTE — LETTER
11/26/21        Ngozi Mathew  [address] [place] [address]      Dear Annette Sanchez records indicate that you have outstanding lab work and or testing that was ordered for you and has not yet been completed:        Urine Dip, auto with

## (undated) NOTE — MR AVS SNAPSHOT
[facility] [address]  [address] [facility] Ankita Dickerson  [phone]               Thank you for choosing us for your health care visit with Berry Trevizo MD.  We are glad to serve you and happy to provide you with • Please call our office about any questions regarding your treatment/medicines/tests as a result of today's visit.  For your safety, read the entire package insert of all medicines prescribed to you and be aware of all of the risks of treatment even beyon 122/90 mmHg 84 67\" 216 lb 33.82 kg/m2         Current Medications          This list is accurate as of: 3/14/17  1:37 PM.  Always use your most recent med list.                Acidophilus/Pectin Caps   Take 1 capsule by mouth daily.    Commonly known as: walking, light jogging, cycling, swimming, etc.) for a goal of at least 150 minutes per week. Moderation of alcohol consumption Men: limit to <= 2 drinks* per day. Women and lighter weight persons: limit to <= 1 drink* per day.

## (undated) NOTE — LETTER
Shakila Smallwood [address]  [address] [place] [address], [address] [place] [address]    Consent for Operation  Date: __________________                                Time: _______________    1.  I authorize the performance upon Ngozi Mathew the following operation:  Mia procedure has been videotaped, the surgeon will obtain the original videotape. The hospital will not be responsible for storage or maintenance of this tape.   7. For the purpose of advancing medical education, I consent to the admittance of observers to the STATEMENTS REQUIRING INSERTION OR COMPLETION WERE FILLED IN.     Signature of Patient:   ___________________________    When the patient is a minor or mentally incompetent to give consent:  Signature of person authorized to consent for patient: ____________ supplements, and pills I can buy without a prescription (including street drugs/illegal medications). Failure to inform my anesthesiologist about these medicines may increase my risk of anesthetic complications. iv.  If I am allergic to anything or have ha Anesthesiologist Signature     Date   Time  I have discussed the procedure and information above with the patient (or patient’s representative) and answered their questions. The patient or their representative has agreed to have anesthesia services.     ___